# Patient Record
Sex: MALE | Race: WHITE | Employment: OTHER | URBAN - METROPOLITAN AREA
[De-identification: names, ages, dates, MRNs, and addresses within clinical notes are randomized per-mention and may not be internally consistent; named-entity substitution may affect disease eponyms.]

---

## 2017-10-04 PROBLEM — I25.10 CAD (CORONARY ARTERY DISEASE): Status: ACTIVE | Noted: 2017-10-04

## 2017-10-04 PROBLEM — I44.1 MOBITZ (TYPE) I (WENCKEBACH'S) ATRIOVENTRICULAR BLOCK: Status: ACTIVE | Noted: 2017-10-04

## 2018-04-17 DIAGNOSIS — I25.10 CORONARY ARTERY DISEASE INVOLVING NATIVE CORONARY ARTERY OF NATIVE HEART WITHOUT ANGINA PECTORIS: ICD-10-CM

## 2018-06-14 DIAGNOSIS — I25.10 CORONARY ARTERY DISEASE INVOLVING NATIVE CORONARY ARTERY OF NATIVE HEART WITHOUT ANGINA PECTORIS: ICD-10-CM

## 2018-06-14 RX ORDER — ROSUVASTATIN CALCIUM 10 MG/1
10 TABLET, COATED ORAL NIGHTLY
Qty: 90 TABLET | Refills: 0 | Status: SHIPPED | OUTPATIENT
Start: 2018-06-14 | End: 2018-09-13 | Stop reason: SDUPTHER

## 2018-06-14 RX ORDER — AMLODIPINE BESYLATE 2.5 MG/1
2.5 TABLET ORAL DAILY
Qty: 90 TABLET | Refills: 0 | Status: SHIPPED | OUTPATIENT
Start: 2018-06-14 | End: 2018-09-13 | Stop reason: SDUPTHER

## 2018-08-23 ENCOUNTER — OFFICE VISIT (OUTPATIENT)
Dept: PRIMARY CARE CLINIC | Age: 73
End: 2018-08-23
Payer: MEDICARE

## 2018-08-23 VITALS
DIASTOLIC BLOOD PRESSURE: 80 MMHG | HEART RATE: 82 BPM | SYSTOLIC BLOOD PRESSURE: 128 MMHG | OXYGEN SATURATION: 98 % | HEIGHT: 73 IN | TEMPERATURE: 96.6 F | BODY MASS INDEX: 26.11 KG/M2 | WEIGHT: 197 LBS

## 2018-08-23 DIAGNOSIS — Z00.00 ROUTINE GENERAL MEDICAL EXAMINATION AT A HEALTH CARE FACILITY: ICD-10-CM

## 2018-08-23 DIAGNOSIS — Z23 NEED FOR PROPHYLACTIC VACCINATION AGAINST STREPTOCOCCUS PNEUMONIAE (PNEUMOCOCCUS): ICD-10-CM

## 2018-08-23 DIAGNOSIS — I25.10 CORONARY ARTERY DISEASE INVOLVING NATIVE CORONARY ARTERY OF NATIVE HEART WITHOUT ANGINA PECTORIS: Primary | ICD-10-CM

## 2018-08-23 DIAGNOSIS — Z12.5 SCREENING PSA (PROSTATE SPECIFIC ANTIGEN): ICD-10-CM

## 2018-08-23 PROCEDURE — G0009 ADMIN PNEUMOCOCCAL VACCINE: HCPCS | Performed by: FAMILY MEDICINE

## 2018-08-23 PROCEDURE — 90670 PCV13 VACCINE IM: CPT | Performed by: FAMILY MEDICINE

## 2018-08-23 PROCEDURE — G0439 PPPS, SUBSEQ VISIT: HCPCS | Performed by: FAMILY MEDICINE

## 2018-08-23 ASSESSMENT — LIFESTYLE VARIABLES
HOW OFTEN DO YOU HAVE A DRINK CONTAINING ALCOHOL: 2
HOW OFTEN DURING THE LAST YEAR HAVE YOU FOUND THAT YOU WERE NOT ABLE TO STOP DRINKING ONCE YOU HAD STARTED: 0
HAS A RELATIVE, FRIEND, DOCTOR, OR ANOTHER HEALTH PROFESSIONAL EXPRESSED CONCERN ABOUT YOUR DRINKING OR SUGGESTED YOU CUT DOWN: 0
HOW OFTEN DURING THE LAST YEAR HAVE YOU BEEN UNABLE TO REMEMBER WHAT HAPPENED THE NIGHT BEFORE BECAUSE YOU HAD BEEN DRINKING: 0
AUDIT TOTAL SCORE: 2
HOW OFTEN DO YOU HAVE SIX OR MORE DRINKS ON ONE OCCASION: 0
HOW OFTEN DURING THE LAST YEAR HAVE YOU NEEDED AN ALCOHOLIC DRINK FIRST THING IN THE MORNING TO GET YOURSELF GOING AFTER A NIGHT OF HEAVY DRINKING: 0
HOW MANY STANDARD DRINKS CONTAINING ALCOHOL DO YOU HAVE ON A TYPICAL DAY: 0
HAVE YOU OR SOMEONE ELSE BEEN INJURED AS A RESULT OF YOUR DRINKING: 0
HOW OFTEN DURING THE LAST YEAR HAVE YOU HAD A FEELING OF GUILT OR REMORSE AFTER DRINKING: 0
AUDIT-C TOTAL SCORE: 2
HOW OFTEN DURING THE LAST YEAR HAVE YOU FAILED TO DO WHAT WAS NORMALLY EXPECTED FROM YOU BECAUSE OF DRINKING: 0

## 2018-08-23 ASSESSMENT — ENCOUNTER SYMPTOMS
SHORTNESS OF BREATH: 0
CHEST TIGHTNESS: 0

## 2018-08-23 ASSESSMENT — PATIENT HEALTH QUESTIONNAIRE - PHQ9
SUM OF ALL RESPONSES TO PHQ QUESTIONS 1-9: 0
SUM OF ALL RESPONSES TO PHQ QUESTIONS 1-9: 0

## 2018-08-23 ASSESSMENT — ANXIETY QUESTIONNAIRES: GAD7 TOTAL SCORE: 0

## 2018-08-23 NOTE — PROGRESS NOTES
Medicare Annual Wellness Visit subsequent  Name: Papito Arauz Date: 2018   MRN: [id number] Sex: Male   Age: 75 y.o. Ethnicity: Non-/Non    : 1945 Race: Abeba Barahona is here for Medicare AWV    Screenings for behavioral, psychosocial and functional/safety risks, and cognitive dysfunction are all negative except as indicated below. These results, as well as other patient data from the [address] form, are documented in Flowsheets linked to this Encounter. No Known Allergies  Prior to Visit Medications    Medication Sig Taking?  Authorizing Provider   rosuvastatin (CRESTOR) 10 MG tablet Take 1 tablet by mouth nightly  Bart Sy, DO   amLODIPine (NORVASC) 2.5 MG tablet Take 1 tablet by mouth daily  Bart Sy, DO   ticagrelor (BRILINTA) 90 MG TABS tablet Take 1 tablet by mouth 2 times daily  Bart Sy, DO   Coenzyme Q10-Levocarnitine (CO Q-10 PLUS PO) Take by mouth  Historical Provider, MD   aspirin 81 MG EC tablet Take 1 tablet by mouth daily  Figueroa Woods, DO   Multiple Vitamins-Minerals (CENTRUM SILVER ADULT 50+) TABS Take 1 tablet by mouth daily  Historical Provider, MD   Omega-3 Fatty Acids (FISH OIL EXTRA STRENGTH PO) Take by mouth daily  Historical Provider, MD   Specialty Vitamins Products (PROSTATE PO) Take by mouth daily  Historical Provider, MD     Past Medical History:   Diagnosis Date    BPH (benign prostatic hyperplasia)     Bronchitis, acute 2013    CAD (coronary artery disease)     Hand pain, right 2014    and numbing    Headache     Hyperlipidemia     Tinnitus      Past Surgical History:   Procedure Laterality Date    CORONARY ANGIOPLASTY WITH STENT PLACEMENT  10/03/2017    Dr Urban  LAD Mid Alpine 3x18mm    HAND SURGERY Right     Carpal tunnel & release trigger finger    HERNIA REPAIR      x2    KNEE ARTHROSCOPY      TONSILLECTOMY AND ADENOIDECTOMY Bilateral     VASECTOMY       Family

## 2018-08-29 ENCOUNTER — NURSE ONLY (OUTPATIENT)
Dept: PRIMARY CARE CLINIC | Age: 73
End: 2018-08-29

## 2018-08-29 ENCOUNTER — HOSPITAL ENCOUNTER (OUTPATIENT)
Age: 73
Discharge: HOME | End: 2018-08-31
Payer: MEDICARE

## 2018-08-29 DIAGNOSIS — E78.2 MIXED HYPERLIPIDEMIA: Primary | ICD-10-CM

## 2018-08-29 DIAGNOSIS — I25.10 CORONARY ARTERY DISEASE INVOLVING NATIVE CORONARY ARTERY OF NATIVE HEART WITHOUT ANGINA PECTORIS: ICD-10-CM

## 2018-08-29 DIAGNOSIS — Z12.5 SCREENING PSA (PROSTATE SPECIFIC ANTIGEN): ICD-10-CM

## 2018-08-29 LAB
ALBUMIN SERPL-MCNC: 4.3 G/DL (ref 3.5–5.2)
ALP BLD-CCNC: 51 U/L (ref 40–129)
ALT SERPL-CCNC: 20 U/L (ref 0–40)
ANION GAP SERPL CALCULATED.3IONS-SCNC: 16 MMOL/L (ref 7–16)
AST SERPL-CCNC: 29 U/L (ref 0–39)
BILIRUB SERPL-MCNC: 0.6 MG/DL (ref 0–1.2)
BUN BLDV-MCNC: 23 MG/DL (ref 8–23)
CALCIUM SERPL-MCNC: 9.3 MG/DL (ref 8.6–10.2)
CHLORIDE BLD-SCNC: 101 MMOL/L (ref 98–107)
CHOLESTEROL, TOTAL: 139 MG/DL (ref 0–199)
CO2: 23 MMOL/L (ref 22–29)
CREAT SERPL-MCNC: 0.9 MG/DL (ref 0.7–1.2)
GFR AFRICAN AMERICAN: >60
GFR NON-AFRICAN AMERICAN: >60 ML/MIN/1.73
GLUCOSE BLD-MCNC: 86 MG/DL (ref 74–109)
HDLC SERPL-MCNC: 48 MG/DL
LDL CHOLESTEROL CALCULATED: 80 MG/DL (ref 0–99)
POTASSIUM SERPL-SCNC: 4.8 MMOL/L (ref 3.5–5)
PROSTATE SPECIFIC ANTIGEN: 3.44 NG/ML (ref 0–4)
SODIUM BLD-SCNC: 140 MMOL/L (ref 132–146)
TOTAL PROTEIN: 7 G/DL (ref 6.4–8.3)
TRIGL SERPL-MCNC: 55 MG/DL (ref 0–149)
VLDLC SERPL CALC-MCNC: 11 MG/DL

## 2018-08-29 PROCEDURE — 80053 COMPREHEN METABOLIC PANEL: CPT

## 2018-08-29 PROCEDURE — G0103 PSA SCREENING: HCPCS

## 2018-08-29 PROCEDURE — 80061 LIPID PANEL: CPT

## 2018-09-13 DIAGNOSIS — I25.10 CORONARY ARTERY DISEASE INVOLVING NATIVE CORONARY ARTERY OF NATIVE HEART WITHOUT ANGINA PECTORIS: ICD-10-CM

## 2018-09-13 RX ORDER — ROSUVASTATIN CALCIUM 10 MG/1
10 TABLET, COATED ORAL NIGHTLY
Qty: 90 TABLET | Refills: 1 | Status: SHIPPED | OUTPATIENT
Start: 2018-09-13 | End: 2019-01-28 | Stop reason: SDUPTHER

## 2018-09-13 RX ORDER — AMLODIPINE BESYLATE 2.5 MG/1
2.5 TABLET ORAL DAILY
Qty: 90 TABLET | Refills: 1 | Status: SHIPPED | OUTPATIENT
Start: 2018-09-13 | End: 2019-01-28 | Stop reason: SDUPTHER

## 2018-09-25 DIAGNOSIS — Z12.11 ENCOUNTER FOR SCREENING FOR MALIGNANT NEOPLASM OF COLON: Primary | ICD-10-CM

## 2018-09-27 ENCOUNTER — TELEPHONE (OUTPATIENT)
Dept: PRIMARY CARE CLINIC | Age: 73
End: 2018-09-27

## 2018-09-27 DIAGNOSIS — M17.9 OSTEOARTHRITIS OF KNEE, UNSPECIFIED LATERALITY, UNSPECIFIED OSTEOARTHRITIS TYPE: Primary | ICD-10-CM

## 2018-09-27 DIAGNOSIS — Z12.11 ENCOUNTER FOR SCREENING FOR MALIGNANT NEOPLASM OF COLON: ICD-10-CM

## 2018-09-27 LAB
CONTROL: NORMAL
HEMOCCULT STL QL: NEGATIVE

## 2018-09-27 PROCEDURE — 82274 ASSAY TEST FOR BLOOD FECAL: CPT | Performed by: FAMILY MEDICINE

## 2018-10-03 ENCOUNTER — TELEPHONE (OUTPATIENT)
Dept: CARDIOLOGY CLINIC | Age: 73
End: 2018-10-03

## 2018-10-29 DIAGNOSIS — I25.10 CORONARY ARTERY DISEASE INVOLVING NATIVE CORONARY ARTERY OF NATIVE HEART WITHOUT ANGINA PECTORIS: ICD-10-CM

## 2018-12-01 ENCOUNTER — APPOINTMENT (OUTPATIENT)
Dept: CT IMAGING | Age: 73
End: 2018-12-01
Payer: MEDICARE

## 2018-12-01 ENCOUNTER — APPOINTMENT (OUTPATIENT)
Dept: GENERAL RADIOLOGY | Age: 73
End: 2018-12-01
Payer: MEDICARE

## 2018-12-01 ENCOUNTER — HOSPITAL ENCOUNTER (EMERGENCY)
Age: 73
Discharge: HOME | End: 2018-12-01
Attending: EMERGENCY MEDICINE
Payer: MEDICARE

## 2018-12-01 VITALS
HEIGHT: 73 IN | SYSTOLIC BLOOD PRESSURE: 150 MMHG | TEMPERATURE: 97.5 F | HEART RATE: 71 BPM | WEIGHT: 200 LBS | OXYGEN SATURATION: 96 % | DIASTOLIC BLOOD PRESSURE: 86 MMHG | BODY MASS INDEX: 26.51 KG/M2 | RESPIRATION RATE: 17 BRPM

## 2018-12-01 DIAGNOSIS — R07.9 CHEST PAIN, UNSPECIFIED TYPE: Primary | ICD-10-CM

## 2018-12-01 LAB
ALBUMIN SERPL-MCNC: 4.2 G/DL (ref 3.5–5.2)
ALP BLD-CCNC: 60 U/L (ref 40–129)
ALT SERPL-CCNC: 20 U/L (ref 0–40)
ANION GAP SERPL CALCULATED.3IONS-SCNC: 14 MMOL/L (ref 7–16)
AST SERPL-CCNC: 22 U/L (ref 0–39)
BASOPHILS ABSOLUTE: 0.03 E9/L (ref 0–0.2)
BASOPHILS RELATIVE PERCENT: 0.4 % (ref 0–2)
BILIRUB SERPL-MCNC: 0.6 MG/DL (ref 0–1.2)
BUN BLDV-MCNC: 16 MG/DL (ref 8–23)
CALCIUM SERPL-MCNC: 9 MG/DL (ref 8.6–10.2)
CHLORIDE BLD-SCNC: 105 MMOL/L (ref 98–107)
CO2: 21 MMOL/L (ref 22–29)
CREAT SERPL-MCNC: 1 MG/DL (ref 0.7–1.2)
D DIMER: 235 NG/ML DDU
EOSINOPHILS ABSOLUTE: 0.13 E9/L (ref 0.05–0.5)
EOSINOPHILS RELATIVE PERCENT: 1.8 % (ref 0–6)
GFR AFRICAN AMERICAN: >60
GFR NON-AFRICAN AMERICAN: >60 ML/MIN/1.73
GLUCOSE BLD-MCNC: 122 MG/DL (ref 74–99)
HCT VFR BLD CALC: 42.7 % (ref 37–54)
HEMOGLOBIN: 14.7 G/DL (ref 12.5–16.5)
IMMATURE GRANULOCYTES #: 0.05 E9/L
IMMATURE GRANULOCYTES %: 0.7 % (ref 0–5)
LYMPHOCYTES ABSOLUTE: 0.97 E9/L (ref 1.5–4)
LYMPHOCYTES RELATIVE PERCENT: 13.7 % (ref 20–42)
MCH RBC QN AUTO: 31.2 PG (ref 26–35)
MCHC RBC AUTO-ENTMCNC: 34.4 % (ref 32–34.5)
MCV RBC AUTO: 90.7 FL (ref 80–99.9)
MONOCYTES ABSOLUTE: 0.67 E9/L (ref 0.1–0.95)
MONOCYTES RELATIVE PERCENT: 9.5 % (ref 2–12)
NEUTROPHILS ABSOLUTE: 5.22 E9/L (ref 1.8–7.3)
NEUTROPHILS RELATIVE PERCENT: 73.9 % (ref 43–80)
PDW BLD-RTO: 12.8 FL (ref 11.5–15)
PLATELET # BLD: 183 E9/L (ref 130–450)
PMV BLD AUTO: 8.8 FL (ref 7–12)
POTASSIUM SERPL-SCNC: 4.4 MMOL/L (ref 3.5–5)
RBC # BLD: 4.71 E12/L (ref 3.8–5.8)
SODIUM BLD-SCNC: 140 MMOL/L (ref 132–146)
TOTAL PROTEIN: 6.9 G/DL (ref 6.4–8.3)
TROPONIN: <0.01 NG/ML (ref 0–0.03)
WBC # BLD: 7.1 E9/L (ref 4.5–11.5)

## 2018-12-01 PROCEDURE — 93005 ELECTROCARDIOGRAM TRACING: CPT | Performed by: PHYSICIAN ASSISTANT

## 2018-12-01 PROCEDURE — 85378 FIBRIN DEGRADE SEMIQUANT: CPT

## 2018-12-01 PROCEDURE — 99285 EMERGENCY DEPT VISIT HI MDM: CPT

## 2018-12-01 PROCEDURE — 36415 COLL VENOUS BLD VENIPUNCTURE: CPT

## 2018-12-01 PROCEDURE — 2580000003 HC RX 258: Performed by: RADIOLOGY

## 2018-12-01 PROCEDURE — 85025 COMPLETE CBC W/AUTO DIFF WBC: CPT

## 2018-12-01 PROCEDURE — 6360000004 HC RX CONTRAST MEDICATION: Performed by: RADIOLOGY

## 2018-12-01 PROCEDURE — 6370000000 HC RX 637 (ALT 250 FOR IP): Performed by: EMERGENCY MEDICINE

## 2018-12-01 PROCEDURE — 71275 CT ANGIOGRAPHY CHEST: CPT

## 2018-12-01 PROCEDURE — 80053 COMPREHEN METABOLIC PANEL: CPT

## 2018-12-01 PROCEDURE — 99222 1ST HOSP IP/OBS MODERATE 55: CPT | Performed by: INTERNAL MEDICINE

## 2018-12-01 PROCEDURE — 84484 ASSAY OF TROPONIN QUANT: CPT

## 2018-12-01 PROCEDURE — 71045 X-RAY EXAM CHEST 1 VIEW: CPT

## 2018-12-01 PROCEDURE — APPSS60 APP SPLIT SHARED TIME 46-60 MINUTES: Performed by: NURSE PRACTITIONER

## 2018-12-01 RX ORDER — ASPIRIN 325 MG
325 TABLET ORAL ONCE
Status: COMPLETED | OUTPATIENT
Start: 2018-12-01 | End: 2018-12-01

## 2018-12-01 RX ORDER — SODIUM CHLORIDE 0.9 % (FLUSH) 0.9 %
10 SYRINGE (ML) INJECTION ONCE
Status: COMPLETED | OUTPATIENT
Start: 2018-12-01 | End: 2018-12-01

## 2018-12-01 RX ADMIN — ASPIRIN 325 MG ORAL TABLET 325 MG: 325 PILL ORAL at 12:03

## 2018-12-01 RX ADMIN — Medication 10 ML: at 12:51

## 2018-12-01 RX ADMIN — IOPAMIDOL 60 ML: 755 INJECTION, SOLUTION INTRAVENOUS at 12:51

## 2018-12-01 ASSESSMENT — ENCOUNTER SYMPTOMS
VOMITING: 0
COLOR CHANGE: 0
CONSTIPATION: 0
SHORTNESS OF BREATH: 0
COUGH: 0
NAUSEA: 0
DIARRHEA: 0
WHEEZING: 0
ABDOMINAL PAIN: 0

## 2018-12-01 NOTE — ED PROVIDER NOTES
69-year-old male with history of hyperlipidemia, cardiac stent placed in December 2017, on brilinta, who presents to the ED with chest pain. Patient states that he woke up with it today. He states that yesterday he was working out, for couple hours, and then also cutting some sticks and doing yard work yesterday. Today, he states that he's had some worsening left-sided chest pain. He states that the pain is located in the left portion of the sternum, he states it is worse with exhalation, and has also been complaining of some left-sided facial numbness, however he states that that has been going on for the last couple of years. Patient states that he has not had a stress test since last year, but does state that he did have a stress test after his stent was placed, and was negative at that time. Patient follows with Dr. Ronald Acosta. Patient flew to [place] drove to the [place], and flew back in October. Heart Score for Risk of Major Adverse Cardiac Events    HISTORY  Highly suspicious  +2  Moderately suspicious +1  Slightly suspicious  +0    EKG  Significant ST depression +2  Non specific repolarization +1  Normal    +0    AGE  >=65    +2  45-65    +1  <45    +0    RISK FACTORS*  > or = 3 risk factors or  +2    history of atherosclerotic    disease. 1-2 risk factors  +1  No risk factors   +0    TROPONIN  >= 3 x normal limit  +2  1-3 x normal limit  +1  Within lormal range  +0    Total    5    *Risk factors  Risk factors: HTN, hypercholesterolemia, DM, obesity (BMI >30 kg/m²), smoking (current, or smoking cessation =3 mo), positive family history (parent or sibling with CVD before age 72); atherosclerotic disease: prior MI, PCI/CABG, CVA/TIA, or peripheral arterial disease    INTERPRETATION  0-3: 0.9-1.7% risk of adverse cardiac event. In the HEART Score, these patients were discharged. (0.99% retrospective)  (1.7% prospective)  4-6: 12-16.6% risk of adverse cardiac event.     In the HEART Score, these XR CHEST PORTABLE   Final Result   No definite pulmonary airspace consolidation.          ------------------------- NURSING NOTES AND VITALS REVIEWED ---------------------------  Date / Time Roomed:  12/1/2018 11:11 AM  ED Bed Assignment:  01/01    The nursing notes within the ED encounter and vital signs as below have been reviewed. BP (!) 150/86   Pulse 71   Temp 97.5 °F (36.4 °C) (Temporal)   Resp 17   Ht 6' 0.5\" (1.842 m)   Wt 200 lb (90.7 kg)   SpO2 96%   BMI 26.75 kg/m²   Oxygen Saturation Interpretation: Normal      ------------------------------------------ PROGRESS NOTES ------------------------------------------  ED Course as of Dec 01 1431   Sat Dec 01, 2018   1149 Patient resting comfortable in no acute distress. [KS]   1369 At [facility] chest at this time. [KS]      ED Course User Index  [KS] Layne Andrew DO         2:31 PM  I have spoken with the patient and discussed todays results, in addition to providing specific details for the plan of care and counseling regarding the diagnosis and prognosis. Their questions are answered at this time and they are agreeable with the plan. I discussed at length with them reasons for immediate return here for re evaluation. They will followup with their primary care physician by calling their office on Monday.      --------------------------------- ADDITIONAL PROVIDER NOTES ---------------------------------  At this time the patient is without objective evidence of an acute process requiring hospitalization or inpatient management. They have remained hemodynamically stable throughout their entire ED visit and are stable for discharge with outpatient follow-up. The plan has been discussed in detail and they are aware of the specific conditions for emergent return, as well as the importance of follow-up. New Prescriptions    No medications on file       Diagnosis:  1.  Chest pain, unspecified type        Disposition:  Patient's disposition:

## 2018-12-01 NOTE — CONSULTS
Patient seen and examined. Chart, labs, imaging studies, EKG and rhythm strips reviewed. Full consult to follow. We were asked to see the patient for Chest pain    PHYSICAL EXAM:   BP (!) 150/86   Pulse 71   Temp 97.5 °F (36.4 °C) (Temporal)   Resp 17   Ht 6' 0.5\" (1.842 m)   Wt 200 lb (90.7 kg)   SpO2 96%   BMI 26.75 kg/m²   CONST:  Well developed  male, examined in room with family present, reclining in bed, well nourished who appears stated age. Awake, alert, cooperative, no apparent distress  HEENT:   Head- Normocephalic, atraumatic   Eyes- Conjunctivae pink, anicteric  Throat- Oral mucosa pink and moist  Neck-  No stridor, trachea midline, no jugular venous distention. No adenopathy   CHEST: Chest symmetrical and non-tender to palpation. No accessory muscle use or intercostal retractions  RESPIRATORY: Lung sounds - clear throughout fields   CARDIOVASCULAR:     No carotid bruit  Heart Inspection- shows no noted pulsations  Heart Palpation- no heaves or thrills; PMI is non-displaced   Heart Ausculation- Regular rate and rhythm, no murmur. No s3, s4 or rub   PV: No lower extremity edema. No varicosities. Pedal pulses palpable, no clubbing or cyanosis   ABDOMEN: Soft, non-tender to light palpation. Bowel sounds present. No palpable masses no organomegaly; no abdominal bruit  MS: Good muscle strength and tone. No atrophy or abnormal movements. : Deferred  SKIN: Warm and dry no statis dermatitis or ulcers   NEURO / PSYCH: Oriented to person, place and time. Speech clear and appropriate. Follows all commands. Pleasant affect     IMPRESSION:  1. Chest pain, different from his pain prior to the angioplasty, occurs with exhalation and also reproducible with chest palpation. Lifts weight ( + does aerobic exercise 3-4 days a week ) as part of his routine workout  2. Known CAD s/p DIVINA to LAD and balloon angioplasty to D2 ( kissing balloons; bifurcation lesion ) 10/2017  3.  Chronic jaw
9. 0     CARDIAC ENZYMES:  Recent Labs      12/01/18   1058   TROPONINI  <0.01     FASTING LIPID PANEL:  Lab Results   Component Value Date    CHOL 139 08/29/2018    HDL 48 08/29/2018    LDLCALC 80 08/29/2018    TRIG 55 08/29/2018     LIVER PROFILE:  Recent Labs      12/01/18   1058   AST  22   ALT  20   LABALBU  4.2     Impression and plan per Dr. Kendrick Fitzgerald  Electronically signed by MOSES Walker CNP on 12/1/2018 at 12:34 PM     I personally and independently saw and examined patient and reviewed all done pertinent laboratory data, imaging studies, ECGs and rhythm strips. I have reviewed and agree with the APN history and physical exam as documented in the above note. Electronically signed by Valente Rodriguez MD on 12/2/2018 at 10:50 AM     We were asked to see the patient for Chest pain     IMPRESSION:  1. Chest pain, different from his pain prior to the angioplasty, occurs with exhalation and also reproducible with chest palpation. Lifts weight ( + does aerobic exercise 3-4 days a week ) as part of his routine workout  2. Known CAD s/p DIVINA to LAD and balloon angioplasty to D2 ( kissing balloons; bifurcation lesion ) 10/2017  3. Chronic jaw pain  4. HTN  5. HLD  6. H/o of Wenckebach with BB therapy  7. Arthritis both knees and right hip. Left hand carpal tunnel  8. BPH  9. ? anxiety     PLAN:   1. Reassure  2. OK for discharge from cardiology stand point  3.  To keep FU appointment with his primary cardiologist Dr. Oren Fabry scheduled for 12/13/18     Electronically signed by Valente Rodriguez MD on 12/1/2018 at 1:24 PM

## 2018-12-04 LAB
EKG ATRIAL RATE: 73 BPM
EKG P AXIS: 66 DEGREES
EKG P-R INTERVAL: 178 MS
EKG Q-T INTERVAL: 366 MS
EKG QRS DURATION: 84 MS
EKG QTC CALCULATION (BAZETT): 403 MS
EKG R AXIS: 73 DEGREES
EKG T AXIS: 39 DEGREES
EKG VENTRICULAR RATE: 73 BPM

## 2018-12-13 ENCOUNTER — OFFICE VISIT (OUTPATIENT)
Dept: CARDIOLOGY CLINIC | Age: 73
End: 2018-12-13
Payer: MEDICARE

## 2018-12-13 VITALS
RESPIRATION RATE: 18 BRPM | HEIGHT: 72 IN | DIASTOLIC BLOOD PRESSURE: 78 MMHG | SYSTOLIC BLOOD PRESSURE: 132 MMHG | BODY MASS INDEX: 28.21 KG/M2 | HEART RATE: 69 BPM | WEIGHT: 208.3 LBS

## 2018-12-13 DIAGNOSIS — I25.10 CORONARY ARTERY DISEASE INVOLVING NATIVE CORONARY ARTERY OF NATIVE HEART WITHOUT ANGINA PECTORIS: Primary | ICD-10-CM

## 2018-12-13 PROCEDURE — 99213 OFFICE O/P EST LOW 20 MIN: CPT | Performed by: INTERNAL MEDICINE

## 2018-12-13 PROCEDURE — 93000 ELECTROCARDIOGRAM COMPLETE: CPT | Performed by: INTERNAL MEDICINE

## 2018-12-14 ENCOUNTER — TELEPHONE (OUTPATIENT)
Dept: PRIMARY CARE CLINIC | Age: 73
End: 2018-12-14

## 2018-12-14 NOTE — PROGRESS NOTES
Ericka Carbajal  1945  Date of Service: 12/14/2018    Patient Active Problem List    Diagnosis Date Noted    Chest pain     CAD (coronary artery disease) 10/04/2017    Ani (type) [facility]) atrioventricular block 10/04/2017    Coronary artery disease due to lipid rich plaque     Hyperlipidemia     Headache     Tinnitus     Hand pain, right 09/01/2014    Bronchitis, acute 03/01/2013       Social History     Social History    Marital status:      Spouse name: N/A    Number of children: N/A    Years of education: N/A     Social History Main Topics    Smoking status: Former Smoker     Packs/day: 1.00     Years: 14.00     Types: Cigars     Quit date: 2003    Smokeless tobacco: Never Used      Comment: occasional    Alcohol use Yes    Drug use: No    Sexual activity: Yes     Partners: Female     Other Topics Concern    None     Social History Narrative    None       Current Outpatient Prescriptions   Medication Sig Dispense Refill    ticagrelor (BRILINTA) 90 MG TABS tablet Take 1 tablet by mouth 2 times daily 180 tablet 1    amLODIPine (NORVASC) 2.5 MG tablet Take 1 tablet by mouth daily 90 tablet 1    rosuvastatin (CRESTOR) 10 MG tablet Take 1 tablet by mouth nightly 90 tablet 1    Coenzyme Q10-Levocarnitine (CO Q-10 PLUS PO) Take by mouth      aspirin 81 MG EC tablet Take 1 tablet by mouth daily 30 tablet 3    Multiple Vitamins-Minerals (CENTRUM SILVER ADULT 50+) TABS Take 1 tablet by mouth daily      Omega-3 Fatty Acids (FISH OIL EXTRA STRENGTH PO) Take by mouth daily      Specialty Vitamins Products (PROSTATE PO) Take by mouth daily       No current facility-administered medications for this visit. No Known Allergies    Chief Complaint:  Ericka Carbajal is here today for follow up and management/recomendations for CP, abn stress     History of Present Illness: Ericka Carbajal states that He does house work, goes up the stairs, & goes shopping.  He also exercises at the [employer] He denies any chest discomfort or dyspnea on exertion while performing any of these activities. He still has the persistent numbness in his jaw for the last 3-4 years. He was recently seen in the hospital for very atypical chest discomforts and discharged. He has not had any recurrences even with the above activities since his discharge. He denies any orthopnea/PND, or lower extremity edema. He denies any palpitations or presyncopal symptoms. REVIEW OF SYSTEMS:  As above. Patient does not complain of any fever, chills, nausea, vomiting or diarrhea. No focal, motor or neurological deficits. No changes in his/her vision, hearing, bowel or bladder habits. He is not known to have a history of thyroid problems. No recent nose bleeds. PHYSICAL EXAM:  Vitals:    12/13/18 1131   BP: 132/78   Pulse: 69   Resp: 18   Weight: 208 lb 4.8 oz (94.5 kg)   Height: 6' (1.829 m)       GENERAL:  He is alert and oriented x 3, communicates well, in no distress. NECK:  No masses, trachea is mid position. Supple, full ROM, no JVD or bruits. No palpable thyromegaly or lymphadenopathy. HEART:  Regular rate and rhythm. Normal S1 and S2. There is an S4 gallop and a I/VI (Normal physiologic) systolic murmur. LUNGS:  Clear to auscultation bilaterally. No use of accessory muscles. symmetrical excursion. ABDOMEN:  Soft, non-tender. Normal bowel sounds. EXTREMITIES:  Full ROM x 4. No bilateral lower extremity edema. Good distal pulses. EYES:  Extraocular muscles intact. PERRL. Normal lids & conjunctiva. ENT:  Nares are clear & not bleeding. Moist mucosa. Normal lips formation. No external masses   NEURO: no tremors, full ROM x 4, EOMI. SKIN:  Warm, dry and intact. Normal turgor. EKG: Sinus rhythm, 69 bpm, nl axis, nonspecific ST - T wave changes. Assessment:   1. Coronary artery disease. No symptoms of recurring ischemia at this time. 2. Hypertension  3. Hypercholesterolemia  4.  Mobitz 1 heart block. Not on any beta blockers. Recommendations:  1. He is following the cholesterol with Dr. Riya Montano. 2. Continue current cardiac medications. Thank you for allowing me to participate in your patient's care.       [address], [address] [facility]  Interventional Cardiology

## 2019-01-28 DIAGNOSIS — I25.10 CORONARY ARTERY DISEASE INVOLVING NATIVE CORONARY ARTERY OF NATIVE HEART WITHOUT ANGINA PECTORIS: ICD-10-CM

## 2019-01-28 RX ORDER — ROSUVASTATIN CALCIUM 10 MG/1
10 TABLET, COATED ORAL NIGHTLY
Qty: 90 TABLET | Refills: 1 | Status: SHIPPED | OUTPATIENT
Start: 2019-01-28 | End: 2019-09-23 | Stop reason: SDUPTHER

## 2019-01-28 RX ORDER — AMLODIPINE BESYLATE 2.5 MG/1
2.5 TABLET ORAL DAILY
Qty: 90 TABLET | Refills: 1 | Status: SHIPPED | OUTPATIENT
Start: 2019-01-28 | End: 2019-09-23 | Stop reason: SDUPTHER

## 2019-04-29 ENCOUNTER — TELEPHONE (OUTPATIENT)
Dept: PRIMARY CARE CLINIC | Age: 74
End: 2019-04-29

## 2019-04-29 NOTE — TELEPHONE ENCOUNTER
Patient needs refill on Brilinta, but he is wondering if he should still be taking this. If so, please send to [facility].

## 2019-05-03 ENCOUNTER — TELEPHONE (OUTPATIENT)
Dept: ADMINISTRATIVE | Age: 74
End: 2019-05-03

## 2019-05-03 NOTE — TELEPHONE ENCOUNTER
Pt calling at the request of his PCP to see if he still needs to stay on the [address]? If so, he needs an RX escribed to [facility] for 90 day 3 refills.

## 2019-05-03 NOTE — TELEPHONE ENCOUNTER
Patient's wife notified of Dr. Kevyn Veloz recommendation. Chart amended. Nessa e-scribed to pharmacy.

## 2019-05-06 ENCOUNTER — OFFICE VISIT (OUTPATIENT)
Dept: PRIMARY CARE CLINIC | Age: 74
End: 2019-05-06
Payer: MEDICARE

## 2019-05-06 VITALS
DIASTOLIC BLOOD PRESSURE: 80 MMHG | OXYGEN SATURATION: 96 % | HEART RATE: 75 BPM | WEIGHT: 208 LBS | SYSTOLIC BLOOD PRESSURE: 130 MMHG | BODY MASS INDEX: 28.21 KG/M2 | TEMPERATURE: 98.2 F

## 2019-05-06 DIAGNOSIS — M17.9 OSTEOARTHRITIS OF KNEE, UNSPECIFIED LATERALITY, UNSPECIFIED OSTEOARTHRITIS TYPE: ICD-10-CM

## 2019-05-06 DIAGNOSIS — Z01.818 PRE-OPERATIVE CLEARANCE: Primary | ICD-10-CM

## 2019-05-06 PROCEDURE — 99213 OFFICE O/P EST LOW 20 MIN: CPT | Performed by: FAMILY MEDICINE

## 2019-05-06 PROCEDURE — 93000 ELECTROCARDIOGRAM COMPLETE: CPT | Performed by: FAMILY MEDICINE

## 2019-05-06 ASSESSMENT — PATIENT HEALTH QUESTIONNAIRE - PHQ9
1. LITTLE INTEREST OR PLEASURE IN DOING THINGS: 0
2. FEELING DOWN, DEPRESSED OR HOPELESS: 0
SUM OF ALL RESPONSES TO PHQ QUESTIONS 1-9: 0
SUM OF ALL RESPONSES TO PHQ QUESTIONS 1-9: 0
SUM OF ALL RESPONSES TO PHQ9 QUESTIONS 1 & 2: 0

## 2019-05-06 ASSESSMENT — ENCOUNTER SYMPTOMS
BACK PAIN: 1
COUGH: 0

## 2019-05-06 NOTE — LETTER
[address] [id number]  Phone: [phone]  Fax: [phone]    Emily Gill DO        May 6, 2019     Patient: Arlet Delgado   YOB: 1945   Date of Visit: 5/6/2019       To Whom It May Concern: It is my medical opinion that Arlet Delgado requires a disability parking placard for the following reasons:  He has limited walking ability due to an orthopedic condition. Duration of need: 2 years    If you have any questions or concerns, please don't hesitate to call.     Sincerely,        Emily Gill DO

## 2019-07-23 ENCOUNTER — OFFICE VISIT (OUTPATIENT)
Dept: PRIMARY CARE CLINIC | Age: 74
End: 2019-07-23
Payer: MEDICARE

## 2019-07-23 VITALS
HEIGHT: 73 IN | OXYGEN SATURATION: 100 % | TEMPERATURE: 98 F | DIASTOLIC BLOOD PRESSURE: 82 MMHG | HEART RATE: 80 BPM | BODY MASS INDEX: 26.24 KG/M2 | SYSTOLIC BLOOD PRESSURE: 134 MMHG | WEIGHT: 198 LBS

## 2019-07-23 DIAGNOSIS — Z01.818 PRE-OPERATIVE CLEARANCE: Primary | ICD-10-CM

## 2019-07-23 DIAGNOSIS — M17.9 OSTEOARTHRITIS OF KNEE, UNSPECIFIED LATERALITY, UNSPECIFIED OSTEOARTHRITIS TYPE: ICD-10-CM

## 2019-07-23 PROCEDURE — 99213 OFFICE O/P EST LOW 20 MIN: CPT | Performed by: FAMILY MEDICINE

## 2019-07-23 ASSESSMENT — ENCOUNTER SYMPTOMS
DIARRHEA: 0
SORE THROAT: 0
ABDOMINAL PAIN: 0
SHORTNESS OF BREATH: 0
CONSTIPATION: 0

## 2019-07-23 NOTE — PROGRESS NOTES
Constitutional: He is oriented to person, place, and time. He appears well-nourished. Eyes: Conjunctivae are normal. No scleral icterus. Neck: Neck supple. Carotid bruit is not present. No thyromegaly present. Cardiovascular: Normal rate and regular rhythm. No murmur heard. Pulmonary/Chest: Effort normal and breath sounds normal. He has no wheezes. He has no rales. Abdominal: Soft. Bowel sounds are normal. He exhibits no mass. There is no tenderness. There is no rebound and no guarding. Musculoskeletal: Normal range of motion. He exhibits no edema. Right knee: He exhibits normal range of motion and no swelling. No tenderness found. Lymphadenopathy:     He has no cervical adenopathy. Neurological: He is alert and oriented to person, place, and time. Skin: Skin is warm and dry. Psychiatric: He has a normal mood and affect. Vitals reviewed. ASSESSMENT AND PLAN:    Chip Desai was seen today for pre-op exam.    Diagnoses and all orders for this visit:    Pre-operative clearance    Osteoarthritis of knee, unspecified laterality, unspecified osteoarthritis type    - low risk for surgery form filled out. Return in about 4 months (around 11/23/2019) for medicare pe and labs. Jan Huerta DO  I reviewed the students documentation ( Hx, exam, MDM ), examined the patient and performed the A&P.

## 2019-07-30 LAB
ALBUMIN SERPL-MCNC: 4.1 G/DL
ALP BLD-CCNC: 61 U/L
ALT SERPL-CCNC: 17 U/L
ANION GAP SERPL CALCULATED.3IONS-SCNC: 7 MMOL/L
AST SERPL-CCNC: 19 U/L
BASOPHILS ABSOLUTE: ABNORMAL /ΜL
BASOPHILS RELATIVE PERCENT: ABNORMAL %
BILIRUB SERPL-MCNC: 0.6 MG/DL (ref 0.1–1.4)
BUN BLDV-MCNC: 21 MG/DL
CALCIUM SERPL-MCNC: 9.2 MG/DL
CHLORIDE BLD-SCNC: 109 MMOL/L
CO2: 25 MMOL/L
CREAT SERPL-MCNC: 1 MG/DL
EOSINOPHILS ABSOLUTE: ABNORMAL /ΜL
EOSINOPHILS RELATIVE PERCENT: ABNORMAL %
GFR CALCULATED: 73
GLUCOSE BLD-MCNC: 107 MG/DL
HCT VFR BLD CALC: 39.3 % (ref 41–53)
HEMOGLOBIN: 13.7 G/DL (ref 13.5–17.5)
LYMPHOCYTES ABSOLUTE: ABNORMAL /ΜL
LYMPHOCYTES RELATIVE PERCENT: ABNORMAL %
MCH RBC QN AUTO: 31.3 PG
MCHC RBC AUTO-ENTMCNC: 34.8 G/DL
MCV RBC AUTO: 89.7 FL
MICROSCOPIC URINE: NORMAL
MONOCYTES ABSOLUTE: ABNORMAL /ΜL
MONOCYTES RELATIVE PERCENT: ABNORMAL %
NEUTROPHILS ABSOLUTE: ABNORMAL /ΜL
NEUTROPHILS RELATIVE PERCENT: ABNORMAL %
PLATELET # BLD: 197 K/ΜL
PMV BLD AUTO: 7 FL
POTASSIUM SERPL-SCNC: 4.6 MMOL/L
RBC # BLD: 4.38 10^6/ΜL
SODIUM BLD-SCNC: 141 MMOL/L
TOTAL PROTEIN: 7
WBC # BLD: 6.6 10^3/ML

## 2019-09-23 ENCOUNTER — OFFICE VISIT (OUTPATIENT)
Dept: PRIMARY CARE CLINIC | Age: 74
End: 2019-09-23
Payer: MEDICARE

## 2019-09-23 VITALS
BODY MASS INDEX: 25.84 KG/M2 | OXYGEN SATURATION: 99 % | TEMPERATURE: 97.9 F | SYSTOLIC BLOOD PRESSURE: 146 MMHG | WEIGHT: 195 LBS | HEART RATE: 84 BPM | HEIGHT: 73 IN | DIASTOLIC BLOOD PRESSURE: 80 MMHG

## 2019-09-23 DIAGNOSIS — I10 ESSENTIAL HYPERTENSION: Primary | ICD-10-CM

## 2019-09-23 DIAGNOSIS — I25.10 CORONARY ARTERY DISEASE INVOLVING NATIVE CORONARY ARTERY OF NATIVE HEART WITHOUT ANGINA PECTORIS: ICD-10-CM

## 2019-09-23 PROCEDURE — 99213 OFFICE O/P EST LOW 20 MIN: CPT | Performed by: FAMILY MEDICINE

## 2019-09-23 RX ORDER — ROSUVASTATIN CALCIUM 10 MG/1
10 TABLET, COATED ORAL NIGHTLY
Qty: 90 TABLET | Refills: 1 | Status: SHIPPED
Start: 2019-09-23 | End: 2020-05-05 | Stop reason: SDUPTHER

## 2019-09-23 RX ORDER — AMLODIPINE BESYLATE 5 MG/1
5 TABLET ORAL DAILY
Qty: 90 TABLET | Refills: 0 | Status: SHIPPED | OUTPATIENT
Start: 2019-09-23 | End: 2019-12-03 | Stop reason: DRUGHIGH

## 2019-09-23 ASSESSMENT — ENCOUNTER SYMPTOMS
SHORTNESS OF BREATH: 0
CONSTIPATION: 0
DIARRHEA: 0

## 2019-09-24 ENCOUNTER — TELEPHONE (OUTPATIENT)
Dept: CARDIOLOGY CLINIC | Age: 74
End: 2019-09-24

## 2019-09-24 NOTE — TELEPHONE ENCOUNTER
He had a very complex bifurcation lesion that is higher risk than most others. At this time he will need to stay on this indefinitely.

## 2019-10-18 ENCOUNTER — TELEPHONE (OUTPATIENT)
Dept: PRIMARY CARE CLINIC | Age: 74
End: 2019-10-18

## 2019-11-04 ENCOUNTER — TELEPHONE (OUTPATIENT)
Dept: CARDIOLOGY CLINIC | Age: 74
End: 2019-11-04

## 2019-11-04 DIAGNOSIS — R07.9 CHEST PAIN, UNSPECIFIED TYPE: Primary | ICD-10-CM

## 2019-11-04 DIAGNOSIS — I25.10 CORONARY ARTERY DISEASE INVOLVING NATIVE CORONARY ARTERY OF NATIVE HEART WITHOUT ANGINA PECTORIS: ICD-10-CM

## 2019-11-04 DIAGNOSIS — R20.0 NUMBNESS: ICD-10-CM

## 2019-11-04 DIAGNOSIS — R68.84 JAW PAIN: ICD-10-CM

## 2019-11-08 ENCOUNTER — HOSPITAL ENCOUNTER (OUTPATIENT)
Dept: CARDIOLOGY | Age: 74
Discharge: HOME | End: 2019-11-08
Payer: MEDICARE

## 2019-11-08 VITALS
HEART RATE: 75 BPM | HEIGHT: 72 IN | WEIGHT: 195 LBS | BODY MASS INDEX: 26.41 KG/M2 | DIASTOLIC BLOOD PRESSURE: 70 MMHG | SYSTOLIC BLOOD PRESSURE: 142 MMHG

## 2019-11-08 DIAGNOSIS — I25.10 CORONARY ARTERY DISEASE INVOLVING NATIVE CORONARY ARTERY OF NATIVE HEART WITHOUT ANGINA PECTORIS: ICD-10-CM

## 2019-11-08 DIAGNOSIS — R07.9 CHEST PAIN, UNSPECIFIED TYPE: Primary | ICD-10-CM

## 2019-11-08 DIAGNOSIS — R20.0 NUMBNESS: ICD-10-CM

## 2019-11-08 DIAGNOSIS — R68.84 JAW PAIN: ICD-10-CM

## 2019-11-08 PROCEDURE — 78452 HT MUSCLE IMAGE SPECT MULT: CPT

## 2019-11-08 PROCEDURE — 6360000002 HC RX W HCPCS: Performed by: INTERNAL MEDICINE

## 2019-11-08 PROCEDURE — A9500 TC99M SESTAMIBI: HCPCS | Performed by: INTERNAL MEDICINE

## 2019-11-08 PROCEDURE — 2580000003 HC RX 258: Performed by: INTERNAL MEDICINE

## 2019-11-08 PROCEDURE — 3430000000 HC RX DIAGNOSTIC RADIOPHARMACEUTICAL: Performed by: INTERNAL MEDICINE

## 2019-11-08 PROCEDURE — 93017 CV STRESS TEST TRACING ONLY: CPT

## 2019-11-08 RX ORDER — SODIUM CHLORIDE 0.9 % (FLUSH) 0.9 %
10 SYRINGE (ML) INJECTION PRN
Status: DISCONTINUED | OUTPATIENT
Start: 2019-11-08 | End: 2019-11-09 | Stop reason: HOSPADM

## 2019-11-08 RX ADMIN — REGADENOSON 0.4 MG: 0.08 INJECTION, SOLUTION INTRAVENOUS at 09:01

## 2019-11-08 RX ADMIN — Medication 10 ML: at 09:01

## 2019-11-08 RX ADMIN — Medication 24.1 MILLICURIE: at 09:01

## 2019-11-08 RX ADMIN — Medication 10 ML: at 06:53

## 2019-11-08 RX ADMIN — Medication 8.1 MILLICURIE: at 06:53

## 2019-11-08 RX ADMIN — Medication 10 ML: at 09:02

## 2019-11-11 ENCOUNTER — TELEPHONE (OUTPATIENT)
Dept: CARDIOLOGY CLINIC | Age: 74
End: 2019-11-11

## 2019-11-13 ENCOUNTER — HOSPITAL ENCOUNTER (OUTPATIENT)
Age: 74
Discharge: HOME | End: 2019-11-15
Payer: MEDICARE

## 2019-11-13 ENCOUNTER — OFFICE VISIT (OUTPATIENT)
Dept: PRIMARY CARE CLINIC | Age: 74
End: 2019-11-13
Payer: MEDICARE

## 2019-11-13 VITALS
BODY MASS INDEX: 27.4 KG/M2 | DIASTOLIC BLOOD PRESSURE: 72 MMHG | SYSTOLIC BLOOD PRESSURE: 122 MMHG | OXYGEN SATURATION: 96 % | TEMPERATURE: 97.2 F | WEIGHT: 202 LBS | HEART RATE: 68 BPM

## 2019-11-13 DIAGNOSIS — I25.10 CORONARY ARTERY DISEASE INVOLVING NATIVE CORONARY ARTERY OF NATIVE HEART WITHOUT ANGINA PECTORIS: ICD-10-CM

## 2019-11-13 DIAGNOSIS — Z12.5 SCREENING PSA (PROSTATE SPECIFIC ANTIGEN): ICD-10-CM

## 2019-11-13 DIAGNOSIS — Z12.11 COLON CANCER SCREENING: ICD-10-CM

## 2019-11-13 DIAGNOSIS — Z00.00 ROUTINE GENERAL MEDICAL EXAMINATION AT A HEALTH CARE FACILITY: ICD-10-CM

## 2019-11-13 DIAGNOSIS — I10 ESSENTIAL HYPERTENSION: ICD-10-CM

## 2019-11-13 LAB
ALBUMIN SERPL-MCNC: 4.5 G/DL (ref 3.5–5.2)
ALP BLD-CCNC: 66 U/L (ref 40–129)
ALT SERPL-CCNC: 6 U/L (ref 0–40)
ANION GAP SERPL CALCULATED.3IONS-SCNC: 14 MMOL/L (ref 7–16)
AST SERPL-CCNC: 19 U/L (ref 0–39)
BILIRUB SERPL-MCNC: 0.6 MG/DL (ref 0–1.2)
BUN BLDV-MCNC: 18 MG/DL (ref 8–23)
CALCIUM SERPL-MCNC: 9.6 MG/DL (ref 8.6–10.2)
CHLORIDE BLD-SCNC: 105 MMOL/L (ref 98–107)
CHOLESTEROL, TOTAL: 118 MG/DL (ref 0–199)
CO2: 24 MMOL/L (ref 22–29)
CREAT SERPL-MCNC: 0.9 MG/DL (ref 0.7–1.2)
GFR AFRICAN AMERICAN: >60
GFR NON-AFRICAN AMERICAN: >60 ML/MIN/1.73
GLUCOSE BLD-MCNC: 91 MG/DL (ref 74–99)
HDLC SERPL-MCNC: 40 MG/DL
LDL CHOLESTEROL CALCULATED: 64 MG/DL (ref 0–99)
POTASSIUM SERPL-SCNC: 4.8 MMOL/L (ref 3.5–5)
PROSTATE SPECIFIC ANTIGEN: 3 NG/ML (ref 0–4)
SODIUM BLD-SCNC: 143 MMOL/L (ref 132–146)
TOTAL PROTEIN: 7.3 G/DL (ref 6.4–8.3)
TRIGL SERPL-MCNC: 70 MG/DL (ref 0–149)
VLDLC SERPL CALC-MCNC: 14 MG/DL

## 2019-11-13 PROCEDURE — 80053 COMPREHEN METABOLIC PANEL: CPT

## 2019-11-13 PROCEDURE — 80061 LIPID PANEL: CPT

## 2019-11-13 PROCEDURE — G0103 PSA SCREENING: HCPCS

## 2019-11-13 PROCEDURE — G0439 PPPS, SUBSEQ VISIT: HCPCS | Performed by: FAMILY MEDICINE

## 2019-11-13 ASSESSMENT — LIFESTYLE VARIABLES
HOW OFTEN DURING THE LAST YEAR HAVE YOU HAD A FEELING OF GUILT OR REMORSE AFTER DRINKING: 0
HOW OFTEN DO YOU HAVE A DRINK CONTAINING ALCOHOL: 1
AUDIT-C TOTAL SCORE: 1
HOW OFTEN DURING THE LAST YEAR HAVE YOU FOUND THAT YOU WERE NOT ABLE TO STOP DRINKING ONCE YOU HAD STARTED: 0
HOW OFTEN DURING THE LAST YEAR HAVE YOU FAILED TO DO WHAT WAS NORMALLY EXPECTED FROM YOU BECAUSE OF DRINKING: 0
HAS A RELATIVE, FRIEND, DOCTOR, OR ANOTHER HEALTH PROFESSIONAL EXPRESSED CONCERN ABOUT YOUR DRINKING OR SUGGESTED YOU CUT DOWN: 0
HOW OFTEN DO YOU HAVE SIX OR MORE DRINKS ON ONE OCCASION: 0
HAVE YOU OR SOMEONE ELSE BEEN INJURED AS A RESULT OF YOUR DRINKING: 0
AUDIT TOTAL SCORE: 1
HOW MANY STANDARD DRINKS CONTAINING ALCOHOL DO YOU HAVE ON A TYPICAL DAY: 0
HOW OFTEN DURING THE LAST YEAR HAVE YOU BEEN UNABLE TO REMEMBER WHAT HAPPENED THE NIGHT BEFORE BECAUSE YOU HAD BEEN DRINKING: 0
HOW OFTEN DURING THE LAST YEAR HAVE YOU NEEDED AN ALCOHOLIC DRINK FIRST THING IN THE MORNING TO GET YOURSELF GOING AFTER A NIGHT OF HEAVY DRINKING: 0

## 2019-11-13 ASSESSMENT — ENCOUNTER SYMPTOMS
BLOOD IN STOOL: 0
CONSTIPATION: 0
DIARRHEA: 0
SHORTNESS OF BREATH: 0

## 2019-11-13 ASSESSMENT — PATIENT HEALTH QUESTIONNAIRE - PHQ9
SUM OF ALL RESPONSES TO PHQ QUESTIONS 1-9: 0
SUM OF ALL RESPONSES TO PHQ QUESTIONS 1-9: 0

## 2020-01-27 RX ORDER — AMLODIPINE BESYLATE 5 MG/1
5 TABLET ORAL DAILY
Qty: 90 TABLET | Refills: 1 | Status: SHIPPED
Start: 2020-01-27 | End: 2020-07-17 | Stop reason: SDUPTHER

## 2020-02-14 ENCOUNTER — TELEPHONE (OUTPATIENT)
Dept: PRIMARY CARE CLINIC | Age: 75
End: 2020-02-14

## 2020-02-14 LAB
CONTROL: NORMAL
HEMOCCULT STL QL: NEGATIVE

## 2020-05-05 ENCOUNTER — OFFICE VISIT (OUTPATIENT)
Dept: FAMILY MEDICINE CLINIC | Age: 75
End: 2020-05-05
Payer: MEDICARE

## 2020-05-05 VITALS
BODY MASS INDEX: 27.8 KG/M2 | WEIGHT: 205 LBS | SYSTOLIC BLOOD PRESSURE: 130 MMHG | TEMPERATURE: 97.7 F | HEART RATE: 91 BPM | OXYGEN SATURATION: 98 % | DIASTOLIC BLOOD PRESSURE: 78 MMHG

## 2020-05-05 PROCEDURE — G8510 SCR DEP NEG, NO PLAN REQD: HCPCS | Performed by: FAMILY MEDICINE

## 2020-05-05 PROCEDURE — 99213 OFFICE O/P EST LOW 20 MIN: CPT | Performed by: FAMILY MEDICINE

## 2020-05-05 RX ORDER — ROSUVASTATIN CALCIUM 10 MG/1
10 TABLET, COATED ORAL NIGHTLY
Qty: 90 TABLET | Refills: 1 | Status: SHIPPED
Start: 2020-05-05 | End: 2020-10-14

## 2020-05-05 ASSESSMENT — PATIENT HEALTH QUESTIONNAIRE - PHQ9
1. LITTLE INTEREST OR PLEASURE IN DOING THINGS: 0
SUM OF ALL RESPONSES TO PHQ QUESTIONS 1-9: 0
2. FEELING DOWN, DEPRESSED OR HOPELESS: 0
SUM OF ALL RESPONSES TO PHQ9 QUESTIONS 1 & 2: 0
SUM OF ALL RESPONSES TO PHQ QUESTIONS 1-9: 0

## 2020-05-05 ASSESSMENT — ENCOUNTER SYMPTOMS
CHEST TIGHTNESS: 0
DIARRHEA: 0
SHORTNESS OF BREATH: 0
CONSTIPATION: 0
BLOOD IN STOOL: 0

## 2020-05-06 LAB
ALBUMIN SERPL-MCNC: 4.4 G/DL
ALP BLD-CCNC: 54 U/L
ALT SERPL-CCNC: 22 U/L
ANION GAP SERPL CALCULATED.3IONS-SCNC: 5 MMOL/L
AST SERPL-CCNC: 23 U/L
BILIRUB SERPL-MCNC: 1 MG/DL (ref 0.1–1.4)
BUN BLDV-MCNC: 19 MG/DL
CALCIUM SERPL-MCNC: 9.2 MG/DL
CHLORIDE BLD-SCNC: 106 MMOL/L
CHOLESTEROL, TOTAL: 112 MG/DL
CHOLESTEROL/HDL RATIO: NORMAL
CO2: 26 MMOL/L
CREAT SERPL-MCNC: 1 MG/DL
GFR CALCULATED: 73
GLUCOSE BLD-MCNC: 94 MG/DL
HDLC SERPL-MCNC: 36 MG/DL (ref 35–70)
LDL CHOLESTEROL CALCULATED: 59 MG/DL (ref 0–160)
POTASSIUM SERPL-SCNC: 4.3 MMOL/L
SODIUM BLD-SCNC: 137 MMOL/L
TOTAL PROTEIN: 7.5
TRIGL SERPL-MCNC: 75 MG/DL
VLDLC SERPL CALC-MCNC: NORMAL MG/DL

## 2020-05-12 ENCOUNTER — TELEPHONE (OUTPATIENT)
Dept: PRIMARY CARE CLINIC | Age: 75
End: 2020-05-12

## 2020-05-13 ENCOUNTER — TELEPHONE (OUTPATIENT)
Dept: CARDIOLOGY CLINIC | Age: 75
End: 2020-05-13

## 2020-05-13 NOTE — TELEPHONE ENCOUNTER
Per Dr. Rebeca Marti, patient needs EKG and 24HR Holter next week (re: Wenckebach and bradycardia) and video visit the following week. Will need BP/P reading for virtual visit. Patient in hospital today, S/P hip surgery.

## 2020-05-15 NOTE — TELEPHONE ENCOUNTER
Patient's wife notified of Dr. Nora Carrington recommendations. EKG/Holter/F/U scheduled accordingly.

## 2020-05-18 ENCOUNTER — NURSE ONLY (OUTPATIENT)
Dept: CARDIOLOGY CLINIC | Age: 75
End: 2020-05-18
Payer: MEDICARE

## 2020-05-18 ENCOUNTER — TELEPHONE (OUTPATIENT)
Dept: CARDIOLOGY CLINIC | Age: 75
End: 2020-05-18

## 2020-05-18 PROCEDURE — 93000 ELECTROCARDIOGRAM COMPLETE: CPT | Performed by: INTERNAL MEDICINE

## 2020-05-19 NOTE — TELEPHONE ENCOUNTER
I called him to discuss his 2-1 heart block. I told him that he needs to see electrophysiology. He agrees. He states that he is asymptomatic. However, he just had hip surgery done and he does say that he has some swelling in his legs that he had surgery on and he gets short of breath when he bends over. Therefore, I am worried about a DVT and PE. He needs a stat d-dimer and call with results. He also needs referral to electrophysiology for the 2-1 heart block.   Thank you

## 2020-05-19 NOTE — TELEPHONE ENCOUNTER
Spoke to patient. He will go to [facility] tomorrow first thing in the morning. He is unable to go today. Referral placed to EP for urgent consult. Quoc Martinez in [address] notified.

## 2020-05-20 ENCOUNTER — HOSPITAL ENCOUNTER (OUTPATIENT)
Age: 75
Discharge: HOME | End: 2020-05-20
Payer: MEDICARE

## 2020-05-20 ENCOUNTER — HOSPITAL ENCOUNTER (EMERGENCY)
Age: 75
Discharge: HOME | End: 2020-05-20
Attending: EMERGENCY MEDICINE
Payer: MEDICARE

## 2020-05-20 ENCOUNTER — APPOINTMENT (OUTPATIENT)
Dept: CT IMAGING | Age: 75
End: 2020-05-20
Payer: MEDICARE

## 2020-05-20 ENCOUNTER — TELEPHONE (OUTPATIENT)
Dept: CARDIOLOGY CLINIC | Age: 75
End: 2020-05-20

## 2020-05-20 ENCOUNTER — APPOINTMENT (OUTPATIENT)
Dept: ULTRASOUND IMAGING | Age: 75
End: 2020-05-20
Payer: MEDICARE

## 2020-05-20 VITALS
OXYGEN SATURATION: 99 % | DIASTOLIC BLOOD PRESSURE: 68 MMHG | HEIGHT: 72 IN | SYSTOLIC BLOOD PRESSURE: 134 MMHG | BODY MASS INDEX: 27.77 KG/M2 | TEMPERATURE: 98.1 F | RESPIRATION RATE: 16 BRPM | HEART RATE: 78 BPM | WEIGHT: 205 LBS

## 2020-05-20 LAB
ANION GAP SERPL CALCULATED.3IONS-SCNC: 8 MMOL/L (ref 7–16)
BASOPHILS ABSOLUTE: 0.04 E9/L (ref 0–0.2)
BASOPHILS RELATIVE PERCENT: 0.4 % (ref 0–2)
BUN BLDV-MCNC: 20 MG/DL (ref 8–23)
CALCIUM SERPL-MCNC: 8.7 MG/DL (ref 8.6–10.2)
CHLORIDE BLD-SCNC: 106 MMOL/L (ref 98–107)
CO2: 24 MMOL/L (ref 22–29)
CREAT SERPL-MCNC: 1 MG/DL (ref 0.7–1.2)
D DIMER: 803 NG/ML DDU
EOSINOPHILS ABSOLUTE: 0.41 E9/L (ref 0.05–0.5)
EOSINOPHILS RELATIVE PERCENT: 4.5 % (ref 0–6)
GFR AFRICAN AMERICAN: >60
GFR NON-AFRICAN AMERICAN: >60 ML/MIN/1.73
GLUCOSE BLD-MCNC: 107 MG/DL (ref 74–99)
HCT VFR BLD CALC: 36.8 % (ref 37–54)
HEMOGLOBIN: 12.3 G/DL (ref 12.5–16.5)
IMMATURE GRANULOCYTES #: 0.17 E9/L
IMMATURE GRANULOCYTES %: 1.9 % (ref 0–5)
LYMPHOCYTES ABSOLUTE: 0.93 E9/L (ref 1.5–4)
LYMPHOCYTES RELATIVE PERCENT: 10.3 % (ref 20–42)
MCH RBC QN AUTO: 31 PG (ref 26–35)
MCHC RBC AUTO-ENTMCNC: 33.4 % (ref 32–34.5)
MCV RBC AUTO: 92.7 FL (ref 80–99.9)
MONOCYTES ABSOLUTE: 1.05 E9/L (ref 0.1–0.95)
MONOCYTES RELATIVE PERCENT: 11.6 % (ref 2–12)
NEUTROPHILS ABSOLUTE: 6.47 E9/L (ref 1.8–7.3)
NEUTROPHILS RELATIVE PERCENT: 71.3 % (ref 43–80)
PDW BLD-RTO: 12.9 FL (ref 11.5–15)
PLATELET # BLD: 215 E9/L (ref 130–450)
PMV BLD AUTO: 9.1 FL (ref 7–12)
POTASSIUM SERPL-SCNC: 4.5 MMOL/L (ref 3.5–5)
PRO-BNP: 55 PG/ML (ref 0–450)
RBC # BLD: 3.97 E12/L (ref 3.8–5.8)
SODIUM BLD-SCNC: 138 MMOL/L (ref 132–146)
TROPONIN: <0.01 NG/ML (ref 0–0.03)
WBC # BLD: 9.1 E9/L (ref 4.5–11.5)

## 2020-05-20 PROCEDURE — 85378 FIBRIN DEGRADE SEMIQUANT: CPT

## 2020-05-20 PROCEDURE — 80048 BASIC METABOLIC PNL TOTAL CA: CPT

## 2020-05-20 PROCEDURE — 85025 COMPLETE CBC W/AUTO DIFF WBC: CPT

## 2020-05-20 PROCEDURE — 83880 ASSAY OF NATRIURETIC PEPTIDE: CPT

## 2020-05-20 PROCEDURE — 6360000004 HC RX CONTRAST MEDICATION: Performed by: RADIOLOGY

## 2020-05-20 PROCEDURE — 36415 COLL VENOUS BLD VENIPUNCTURE: CPT

## 2020-05-20 PROCEDURE — 99285 EMERGENCY DEPT VISIT HI MDM: CPT

## 2020-05-20 PROCEDURE — 71275 CT ANGIOGRAPHY CHEST: CPT

## 2020-05-20 PROCEDURE — 84484 ASSAY OF TROPONIN QUANT: CPT

## 2020-05-20 PROCEDURE — 93971 EXTREMITY STUDY: CPT

## 2020-05-20 RX ORDER — SODIUM CHLORIDE 0.9 % (FLUSH) 0.9 %
10 SYRINGE (ML) INJECTION PRN
Status: DISCONTINUED | OUTPATIENT
Start: 2020-05-20 | End: 2020-05-20 | Stop reason: HOSPADM

## 2020-05-20 RX ADMIN — IOPAMIDOL 80 ML: 755 INJECTION, SOLUTION INTRAVENOUS at 15:36

## 2020-05-20 ASSESSMENT — ENCOUNTER SYMPTOMS
DIARRHEA: 0
WHEEZING: 0
SINUS PRESSURE: 0
SWOLLEN GLANDS: 0
SHORTNESS OF BREATH: 1
SPUTUM PRODUCTION: 0
EYE DISCHARGE: 0
EYE PAIN: 0
ABDOMINAL PAIN: 0
NAUSEA: 0
EYE REDNESS: 0
BACK PAIN: 0
SORE THROAT: 0
VOMITING: 0
COUGH: 0

## 2020-05-20 NOTE — ED PROVIDER NOTES
(1988); Hand surgery (Right); Coronary angioplasty with stent (10/03/2017); Total knee arthroplasty (Left, 05/13/2019); Total knee arthroplasty (Right, 08/12/2019); Colonoscopy; and Total hip arthroplasty (Right, 05/13/2020). Social History:  reports that he quit smoking about 17 years ago. His smoking use included cigars. He has a 14.00 pack-year smoking history. He has never used smokeless tobacco. He reports current alcohol use. He reports that he does not use drugs. Family History: family history includes Cancer in his brother, brother, father, and mother; No Known Problems in his brother, brother, child, child, child, sister, and another family member. The patients home medications have been reviewed. Allergies: Patient has no known allergies.     -------------------------------------------------- RESULTS -------------------------------------------------  Labs:  Results for orders placed or performed during the hospital encounter of 05/20/20   CBC Auto Differential   Result Value Ref Range    WBC 9.1 4.5 - 11.5 E9/L    RBC 3.97 3.80 - 5.80 E12/L    Hemoglobin 12.3 (L) 12.5 - 16.5 g/dL    Hematocrit 36.8 (L) 37.0 - 54.0 %    MCV 92.7 80.0 - 99.9 fL    MCH 31.0 26.0 - 35.0 pg    MCHC 33.4 32.0 - 34.5 %    RDW 12.9 11.5 - 15.0 fL    Platelets 680 694 - 667 E9/L    MPV 9.1 7.0 - 12.0 fL    Neutrophils % 71.3 43.0 - 80.0 %    Immature Granulocytes % 1.9 0.0 - 5.0 %    Lymphocytes % 10.3 (L) 20.0 - 42.0 %    Monocytes % 11.6 2.0 - 12.0 %    Eosinophils % 4.5 0.0 - 6.0 %    Basophils % 0.4 0.0 - 2.0 %    Neutrophils Absolute 6.47 1.80 - 7.30 E9/L    Immature Granulocytes # 0.17 E9/L    Lymphocytes Absolute 0.93 (L) 1.50 - 4.00 E9/L    Monocytes Absolute 1.05 (H) 0.10 - 0.95 E9/L    Eosinophils Absolute 0.41 0.05 - 0.50 E9/L    Basophils Absolute 0.04 0.00 - 0.20 Z2/F   Basic Metabolic Panel   Result Value Ref Range    Sodium 138 132 - 146 mmol/L    Potassium 4.5 3.5 - 5.0 mmol/L    Chloride 106 98 - 107

## 2020-05-21 ENCOUNTER — TELEPHONE (OUTPATIENT)
Dept: NON INVASIVE DIAGNOSTICS | Age: 75
End: 2020-05-21

## 2020-05-21 NOTE — TELEPHONE ENCOUNTER
----- Message from Mason Faulkner DO sent at 5/20/2020  8:25 AM EDT -----  Reviewed--I believe he is asymptomatic but I can see him with a VV anywhere he fits in my schedule--tomorrow or Friday is fine. ALK  ----- Message -----  From: Raquel Christopher  Sent: 5/19/2020   3:42 PM EDT  To: Mason Faulkner DO    WS referring to EP urgently for 2:1 block. You saw this patient back in Oct 2017. Please advise how to schedule.  thx

## 2020-05-21 NOTE — PROGRESS NOTES
10/04/2017    Coronary artery disease due to lipid rich plaque      Overview Note:     10/3/17  3.0 x 18-mm Alpine, drug-eluting stent in the mid LAD followed by POTS          Hyperlipidemia     Headache     Tinnitus     Hand pain, right 2014    Bronchitis, acute 2013       Past Medical History:   Diagnosis Date    BPH (benign prostatic hyperplasia)     Bronchitis, acute 2013    CAD (coronary artery disease)     Hand pain, right 2014    and numbing    Headache     Heart block AV second degree     Hyperlipidemia     Hypertension     Tinnitus        Past Surgical History:   Procedure Laterality Date    COLONOSCOPY      CORONARY ANGIOPLASTY WITH STENT PLACEMENT  10/03/2017    Dr Jt Holloway LAD Mid Alpine 3x18mm    HAND SURGERY Right     Carpal tunnel & release trigger finger    HERNIA REPAIR      x2    KNEE ARTHROSCOPY      TONSILLECTOMY AND ADENOIDECTOMY Bilateral     TOTAL HIP ARTHROPLASTY Right 2020    Stephen Ridges    TOTAL KNEE ARTHROPLASTY Left 2019    Dom Stephen Ridges    TOTAL KNEE ARTHROPLASTY Right 2019    Cathaleen Roots VASECTOMY         Family History   Problem Relation Age of Onset    Cancer Mother         Colon     Age 80    Cancer Father         Back [de-identified] Age 68 tumor in the back    No Known Problems Sister     No Known Problems Brother     No Known Problems Other     No Known Problems Child     No Known Problems Child     No Known Problems Child     Cancer Brother         Colon & prostate    No Known Problems Brother     Cancer Brother         Melanoma  age 39       Social History     Tobacco Use    Smoking status: Former Smoker     Packs/day: 1.00     Years: 14.00     Pack years: 14.00     Types: Cigars     Last attempt to quit: 2003     Years since quittin.4    Smokeless tobacco: Never Used    Tobacco comment: occasional   Substance Use Topics    Alcohol use: Yes       Current Outpatient Medications   Medication Sig Dispense Refill    still  significant pinching of the ostium of the diagonal.  Therefore, the  side of the stent _____ were then crossed with a second Luge wire. A  3.0-mm balloon was then used to dilate the side _____ of the stent  with a 3.0 noncomplaint balloon. A second 3.0 noncomplaint balloon  was then used to as a kissing balloon in the LAD. Simultaneous  inflations of the LAD and diagonal balloons were then performed using  a kissing balloon technique once again. Two inflations were  performed. Followup angiogram performed which demonstrated 0%  residual stenosis and excellent LYNDSAY-3 flow. Wires were removed, and  followup angiograms demonstrated again 0% residual stenosis and  excellent LYNDSAY-3 flow. The right radial artery sheath had been  removed and a TR band placed successfully with good patent hemostasis. He tolerated the procedure well with no complications.           Mathieu Guzman DO     D: 10/03/2017 10:11:20       T: 10/03/2017 12:38:21     [email or web address]  Job#: [id number]     Doc#: [id number]    ECG--5/18/20: Sinus rhythm with 2:1 AVB, NAD, normal FL and QT interval. Normal QRSd. No acute ST changes. I have independently reviewed all of the ECGs and rhythm strips per above. I have personally reviewed the laboratory, cardiac diagnostic and radiographic testing as outlined above. I have reviewed previous records noted in [address]. Impression:    1. Mobitz 1/Mobitz II AVB  - no symptoms of fatigue, dizziness, near syncope or syncope  - narrow QRSd  - currently on Brilinta  Lab Results   Component Value Date    TSH 1.800 10/04/2017   - last documented HR of 74 bpm--5/20/20 via ER visit    2. CAD  - LHC as above  - s/p stent to the LAD.     3. HLD  - on Crestor  Lab Results   Component Value Date    CHOL 112 05/06/2020    CHOL 118 11/13/2019    CHOL 139 08/29/2018     Lab Results   Component Value Date    TRIG 75 05/06/2020    TRIG 70 11/13/2019    TRIG 55 08/29/2018     Lab Results

## 2020-05-22 ENCOUNTER — VIRTUAL VISIT (OUTPATIENT)
Dept: NON INVASIVE DIAGNOSTICS | Age: 75
End: 2020-05-22
Payer: MEDICARE

## 2020-05-22 PROCEDURE — 99214 OFFICE O/P EST MOD 30 MIN: CPT | Performed by: INTERNAL MEDICINE

## 2020-05-26 ENCOUNTER — TELEPHONE (OUTPATIENT)
Dept: CARDIOLOGY CLINIC | Age: 75
End: 2020-05-26

## 2020-05-26 RX ORDER — CLOPIDOGREL BISULFATE 75 MG/1
75 TABLET ORAL DAILY
Qty: 90 TABLET | Refills: 3 | Status: SHIPPED
Start: 2020-05-26 | End: 2021-05-17

## 2020-05-26 RX ORDER — CLOPIDOGREL BISULFATE 75 MG/1
75 TABLET ORAL DAILY
COMMUNITY
End: 2020-05-26 | Stop reason: SDUPTHER

## 2020-05-27 ENCOUNTER — VIRTUAL VISIT (OUTPATIENT)
Dept: CARDIOLOGY CLINIC | Age: 75
End: 2020-05-27
Payer: MEDICARE

## 2020-05-27 VITALS
WEIGHT: 205 LBS | SYSTOLIC BLOOD PRESSURE: 118 MMHG | HEART RATE: 78 BPM | HEIGHT: 72 IN | DIASTOLIC BLOOD PRESSURE: 71 MMHG | BODY MASS INDEX: 27.77 KG/M2

## 2020-05-27 PROCEDURE — 99214 OFFICE O/P EST MOD 30 MIN: CPT | Performed by: INTERNAL MEDICINE

## 2020-05-27 RX ORDER — ACETAMINOPHEN 500 MG
500 TABLET ORAL PRN
COMMUNITY

## 2020-05-27 NOTE — PROGRESS NOTES
Damion Diego  1945  Date of Service: 2020    Patient Active Problem List    Diagnosis Date Noted    Chest pain     CAD (coronary artery disease) 10/04/2017    Mobitz (type) I (Wenckebach's) atrioventricular block 10/04/2017    Coronary artery disease due to lipid rich plaque      Overview Note:     10/3/17  3.0 x 18-mm Alpine, drug-eluting stent in the mid LAD followed by POTS          Hyperlipidemia     Headache     Tinnitus     Hand pain, right 2014    Bronchitis, acute 2013       Social History     Socioeconomic History    Marital status:      Spouse name: None    Number of children: None    Years of education: None    Highest education level: None   Occupational History    None   Social Needs    Financial resource strain: None    Food insecurity     Worry: None     Inability: None    Transportation needs     Medical: None     Non-medical: None   Tobacco Use    Smoking status: Former Smoker     Packs/day: 1.00     Years: 14.00     Pack years: 14.00     Types: Cigars     Last attempt to quit: 2003     Years since quittin.4    Smokeless tobacco: Never Used    Tobacco comment: occasional   Substance and Sexual Activity    Alcohol use: Yes     Comment: occasionally    Drug use: No    Sexual activity: Yes     Partners: Female   Lifestyle    Physical activity     Days per week: None     Minutes per session: None    Stress: None   Relationships    Social connections     Talks on phone: None     Gets together: None     Attends Restorationist service: None     Active member of club or organization: None     Attends meetings of clubs or organizations: None     Relationship status: None    Intimate partner violence     Fear of current or ex partner: None     Emotionally abused: None     Physically abused: None     Forced sexual activity: None   Other Topics Concern    None   Social History Narrative    None       Current Outpatient Medications   Medication Sig Dispense Refill    acetaminophen (TYLENOL) 500 MG tablet Take 500 mg by mouth as needed for Pain      rosuvastatin (CRESTOR) 10 MG tablet Take 1 tablet by mouth nightly 90 tablet 1    amLODIPine (NORVASC) 5 MG tablet Take 1 tablet by mouth daily 90 tablet 1    Coenzyme Q10-Levocarnitine (CO Q-10 PLUS PO) Take by mouth      aspirin 81 MG EC tablet Take 1 tablet by mouth daily 30 tablet 3    Multiple Vitamins-Minerals (CENTRUM SILVER ADULT 50+) TABS Take 1 tablet by mouth daily      Omega-3 Fatty Acids (FISH OIL EXTRA STRENGTH PO) Take by mouth daily      Specialty Vitamins Products (PROSTATE PO) Take by mouth daily      clopidogrel (PLAVIX) 75 MG tablet Take 1 tablet by mouth daily (Patient not taking: Reported on 5/27/2020) 90 tablet 3     No current facility-administered medications for this visit. No Known Allergies    Chief Complaint:  Guzman Montague is here today for follow up and management/recomendations for CP, abn stress     History of Present Illness: Guzman Montague had hip surgery 2 weeks ago. He also had right knee replacement pain and left knee replacement in May 2019. He states that he has chronic mild windedness with exertion for over 1 year. He believes that this is more deconditioning and not actual dyspnea. He did have lower extremity swelling after his hip surgery. He denies any chest discomfort while performing any of these activities. A d-dimer was recently checked for this breathing and was found to be abnormal.  He therefore went to the emergency room and had a CT of his chest and lower extremity Doppler. These were negative for DVT and PE. He denies any orthopnea/PND, or lower extremity edema. He denies any palpitations or presyncopal symptoms. REVIEW OF SYSTEMS:  As above. Patient does not complain of any fever, chills, nausea, vomiting or diarrhea. No focal, motor or neurological deficits. No changes in his/her vision, hearing, bowel or bladder habits.   He is not known to have a history of thyroid problems. No recent nose bleeds. PHYSICAL EXAM:  Vitals:    20 1409   BP: 118/71   Pulse: 78   Weight: 205 lb (93 kg)   Height: 6' (1.829 m)     2020    TELEHEALTH EVALUATION -- Audio/Visual (During DAJGT-38 public health emergency)    HPI:    Spencer Nesbitt (:  1945) has requested an audio/video evaluation for the following concern(s):    CAD    Prior to Visit Medications    Medication Sig Taking?  Authorizing Provider   acetaminophen (TYLENOL) 500 MG tablet Take 500 mg by mouth as needed for Pain Yes Historical Provider, MD   rosuvastatin (CRESTOR) 10 MG tablet Take 1 tablet by mouth nightly Yes Bart Sy DO   amLODIPine (NORVASC) 5 MG tablet Take 1 tablet by mouth daily Yes Bart Sy DO   Coenzyme Q10-Levocarnitine (CO Q-10 PLUS PO) Take by mouth Yes Historical Provider, MD   aspirin 81 MG EC tablet Take 1 tablet by mouth daily Yes Seth Zavala DO   Multiple Vitamins-Minerals (CENTRUM SILVER ADULT 50+) TABS Take 1 tablet by mouth daily Yes Historical Provider, MD   Omega-3 Fatty Acids (FISH OIL EXTRA STRENGTH PO) Take by mouth daily Yes Historical Provider, MD   Specialty Vitamins Products (PROSTATE PO) Take by mouth daily Yes Historical Provider, MD   clopidogrel (PLAVIX) 75 MG tablet Take 1 tablet by mouth daily  Patient not taking: Reported on 2020  Seth Zavala DO       Social History     Tobacco Use    Smoking status: Former Smoker     Packs/day: 1.00     Years: 14.00     Pack years: 14.00     Types: Cigars     Last attempt to quit: 2003     Years since quittin.4    Smokeless tobacco: Never Used    Tobacco comment: occasional   Substance Use Topics    Alcohol use: Yes     Comment: occasionally    Drug use: No        PHYSICAL EXAMINATION:  [ INSTRUCTIONS:  \"[x]\" Indicates a positive item  \"[]\" Indicates a negative item  -- DELETE ALL ITEMS NOT EXAMINED]  Vital Signs: (As above)    Constitutional: [x] Appears well-developed and well-nourished [x] No apparent distress      [] Abnormal-   Mental status  [x] Alert and awake  [x] Oriented to person/place/time [x]Able to follow commands      Eyes:  EOM    [x]  Normal  [] Abnormal-  Sclera  [x]  Normal  [] Abnormal -         Discharge [x]  None visible  [] Abnormal -    HENT:   [x] Normocephalic, atraumatic. [] Abnormal   [x] Mouth/Throat: Mucous membranes are moist.     External Ears [x] Normal  [] Abnormal-     Neck: [x] No visualized mass     Pulmonary/Chest: [x] Respiratory effort normal.  [x] No visualized signs of difficulty breathing or respiratory distress        [] Abnormal-      Musculoskeletal:   [] Normal gait with no signs of ataxia         [x] Normal range of motion of neck        [] Abnormal-       Neurological:        [x] No Facial Asymmetry (Cranial nerve 7 motor function) (limited exam to video visit)          [x] No gaze palsy        [] Abnormal-         Skin:        [x] No significant exanthematous lesions or discoloration noted on facial skin         [] Abnormal-            Psychiatric:       [x] Normal Affect [x] No Hallucinations        [] Abnormal-     Other pertinent observable physical exam findings-     ASSESSMENT/PLAN:  There are no diagnoses linked to this encounter. No follow-ups on file. Spencer Nesbitt is a 75 y.o. male being evaluated by a Virtual Visit (video visit) encounter to address concerns as mentioned above. A caregiver was present when appropriate. Due to this being a TeleHealth encounter (During AEQTA-76 public health emergency), evaluation of the following organ systems was limited: Vitals/Constitutional/EENT/Resp/CV/GI//MS/Neuro/Skin/Heme-Lymph-Imm.   Pursuant to the emergency declaration under the [facility] waiver authority and the tomoguides and [employer] Act, this Virtual Visit was conducted with patient's (and/or legal guardian's) consent, to

## 2020-06-26 ENCOUNTER — TELEPHONE (OUTPATIENT)
Dept: NON INVASIVE DIAGNOSTICS | Age: 75
End: 2020-06-26

## 2020-07-06 ENCOUNTER — TELEPHONE (OUTPATIENT)
Dept: CARDIOLOGY CLINIC | Age: 75
End: 2020-07-06

## 2020-07-12 NOTE — PROGRESS NOTES
[address]    Srinivas Chang  1945  Date of Service: 7/15/2020  PCP: Gi Couch DO  Cardiologist: Merline Harrison, DO  Electrophysiologist: Eduardo Teague DO    Reason for Consultation: Mobitz II AVB    10/4/17--Hospital Consultation: Srinivas Chang is a 66 yo male who I was asked to see in Cardiac Electrophysiology consultation for intermittent AVWB. He is a very pleasant 66 yo male with the PMHx as noted below. He presented to the hospital for an elective LHC secondary to an abnormal stress test.  The LHC revealed a LAD lesion which underwent stent placement by Dr. Cherise Delcid yesterday. During the [facility] and after, he was noted to have intermittent AVWB. His OP Toprol XL was put on hold yesterday. This am he continues in sinus rhythm at 65-70 bpm.  He did have AVWB overnight at 3-3:30 am.  He has had no episodes of progressive AVB. He denies any symptoms of CP, dizziness, near syncope or syncope.      05/22/20--VV: Srinivas Chang gives verbal consent for a virtual video visit from his home via my office on the [employer] platform. I saw Mr. Jennifer Kendrick in original consultation as above. At that time, he was noted to have asymptomatic AVNWB. He recently underwent THR and when in [facility], he was seen by Dr. Idris Nettles from [address] [place] [address] for [address] At that time, there was no reported higher degree AVB. He recently saw Dr. Cherise Delcid and was found to be in sinus rhythm with 2:1 AVB which he was asymptomatic. Interestingly, he presented to the ER on 5/20/20 secondary to RLE swelling and concern for DVT. He underwent a RLE US and CTA of the chest, both of which were negative. Of note, no ECG was performed but his HR was reported at 74 bpm.  He denies any symptoms of dizziness, near syncope or syncope.    07/15/20--VV: Srinivas Chang and his wife give verbal consent for a virtual video visit from his home via my  office on the [employer] platform. 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 Today we had an extensive discussion regarding the indications for pacemaker implantation. He states that he is feeling well and is asymptomatic. He denies any chest pain, shortness of breath, orthopnea or PND. He denies any lightheadedness, dizziness, or syncope.       Patient Active Problem List    Diagnosis Date Noted    Chest pain     CAD (coronary artery disease) 10/04/2017    Mobitz (type) I (Wenckebach's) atrioventricular block 10/04/2017    Coronary artery disease due to lipid rich plaque      Overview Note:     10/3/17  3.0 x 18-mm Alpine, drug-eluting stent in the mid LAD followed by POTS          Hyperlipidemia     Headache     Tinnitus     Hand pain, right 09/01/2014    Bronchitis, acute 03/01/2013       Past Medical History:   Diagnosis Date    BPH (benign prostatic hyperplasia)     Bronchitis, acute 03/2013    CAD (coronary artery disease)     Hand pain, right 09/2014    and numbing    Headache     Heart block AV second degree     mobitz    Hyperlipidemia     Hypertension     Mobitz I     Tinnitus        Past Surgical History:   Procedure Laterality Date    COLONOSCOPY      CORONARY ANGIOPLASTY WITH STENT PLACEMENT  10/03/2017    Dr Janice Hogan LAD Mid Alpine 3x18mm    HAND SURGERY Right     Carpal tunnel & release trigger finger    HERNIA REPAIR      x2    KNEE ARTHROSCOPY  1988    SKIN CANCER EXCISION  01/2020    left cheek/right bottom eyelid    TONSILLECTOMY AND ADENOIDECTOMY Bilateral     TOTAL HIP ARTHROPLASTY Right 05/13/2020    Monica Nixon    TOTAL KNEE ARTHROPLASTY Left 05/13/2019    Dom Monica Nixon    TOTAL KNEE ARTHROPLASTY Right 08/12/2019    Lucrecia Butt VASECTOMY         Family History   Problem Relation Age of Onset    Cancer Mother         Colon     Age 80    Cancer Father         Back [de-identified] Age 68 tumor in the back    No Known Problems Sister     No Known Problems Brother     No Known Problems Other     No Known Problems Child     No Known Problems Child     No Known Problems Child     Cancer Brother         Colon & prostate    No Known Problems Brother     Cancer Brother         Melanoma  age 39       Social History     Tobacco Use    Smoking status: Former Smoker     Packs/day: 1.00     Years: 14.00     Pack years: 14.00     Types: Cigars     Last attempt to quit:      Years since quittin.5    Smokeless tobacco: Never Used    Tobacco comment: occasional   Substance Use Topics    Alcohol use: Yes     Comment: occasionally       Current Outpatient Medications   Medication Sig Dispense Refill    acetaminophen (TYLENOL) 500 MG tablet Take 500 mg by mouth as needed for Pain      clopidogrel (PLAVIX) 75 MG tablet Take 1 tablet by mouth daily (Patient not taking: Reported on 2020) 90 tablet 3    rosuvastatin (CRESTOR) 10 MG tablet Take 1 tablet by mouth nightly 90 tablet 1    amLODIPine (NORVASC) 5 MG tablet Take 1 tablet by mouth daily 90 tablet 1    Coenzyme Q10-Levocarnitine (CO Q-10 PLUS PO) Take by mouth      aspirin 81 MG EC tablet Take 1 tablet by mouth daily 30 tablet 3    Multiple Vitamins-Minerals (CENTRUM SILVER ADULT 50+) TABS Take 1 tablet by mouth daily      Omega-3 Fatty Acids (FISH OIL EXTRA STRENGTH PO) Take by mouth daily      Specialty Vitamins Products (PROSTATE PO) Take by mouth daily       No current facility-administered medications for this visit. No Known Allergies    ROS:   Constitutional: Negative for fever, activity change and appetite change. HENT: Negative for epistaxis, difficulty swallowing. Eyes: Negative for blurred vision or double vision. Respiratory: Negative for cough, chest tightness, shortness of breath and wheezing. Cardiovascular: per HPI. Gastrointestinal: Negative for abdominal pain, heartburn, or blood in stool. Genitourinary: Negative for hematuria, burning or frequency. Musculoskeletal: Negative for myalgias, stiffness, or swelling. Skin: Negative for rash, pain, or lumps. Neurological: Negative for syncope, seizures, or headaches. Psychiatric/Behavioral: Negative for confusion and agitation. The patient is not nervous/anxious. PHYSICAL EXAM:  Constitutional: Oriented to person, place, and time. Well-developed and cooperative. Head: Normocephalic and atraumatic. Musculoskeletal: Normal range of motion of all extremities, no muscle weakness. Neurological: Alert and oriented to person, place, and time. Skin: Skin is warm and dry. No bruising, no ecchymosis and no rash noted. Extremity: No clubbing or cyanosis. No edema. Psychiatric: Normal mood and affect. Thought content normal.     Pertinent Labs:  CBC:   No results for input(s): WBC, HGB, HCT, PLT in the last 72 hours. BMP:  No results for input(s): NA, K, CL, CO2, BUN, CREATININE, CALCIUM in the last 72 hours. Invalid input(s): GLU  ABGs: No results found for: PHART, PO2ART, JIB9YQP  INR:   No results for input(s): INR in the last 72 hours. BNP: No results for input(s): BNP in the last 72 hours. TSH:   Lab Results   Component Value Date    TSH 1.800 10/04/2017      Cardiac Injury Profile:   No results for input(s): CKTOTAL, CKMB, CKMBINDEX, TROPONINI in the last 72 hours. Lipid Profile:   Lab Results   Component Value Date    TRIG 75 05/06/2020    HDL 36 05/06/2020    LDLCALC 59 05/06/2020    CHOL 112 05/06/2020      Hemoglobin A1C: No components found for: HGBA1C   Xray:   No orders to display     Pertinent Cardiac Testing:     Preventice Patch MCOT--6/2020:  Cardiac Electrophysiology Cardiac Monitor Report     Leana Po  1945   Date of Service: 7/1/2020   PCP: Bj George DO   Electrophysiologist: Mook Kong DO     1. Sinus rhythm/sinus bradycardia. 2. Intermittent Mobitz I and Mobitz II AVB. 3. 5 beats of NSVT. 4. No AF or SVT.      Mook Kong DO   [facility] Cardiac Electrophysiology   [address] [facility](10/3/17): Sameer Kellogg                 [address] [place] [address],  [facility]                            CARDIAC CATHETERIZATION     PATIENT NAME: Gladys Harrison                          :             1945  MED REC NO:   [id number]                             ROOM:            [facility]  ACCOUNT NO:   [de-identified]                           ADMISSION DATE:  10/03/2017  PROVIDER:     Karina Stephenson DO     DATE OF PROCEDURE:  10/03/2017     PROCEDURES:  1. Left heart catheterization. 2.  Percutaneous coronary intervention with a 3.0 x 18-mm Alpine,  drug-eluting stent in the mid LAD followed by POTS technique and  kissing balloon for the diagonal side branch.     INDICATIONS:  Unstable angina with an abnormal stress test.     COMPLICATIONS:  None.     SEDATION:  Intravenous Versed.     SEDATION TIME:  I was present for sedation administration at 0745 and  I ended sedation at 0930 for a total face-to-face sedation time of 1  hour and 45 minutes.     HEMODYNAMICS:  1. Opening aortic pressure 109/59 with a mean of 80.  2.  Left ventricular systolic pressure 979.  3.  LVEDP 22.  4.  No significant gradient across the aortic valve.     ANGIOGRAPHIC RESULTS:  1. Left main:  Short. No angiographically significant stenosis. 2.  LAD:  Very tortous vessel, mid, eccentric, 80-85% diffuse stenosis  right where the second diagonal takes off.  3.  D1:  No angiographically significant stenosis. 4.  D2:  :Large vessel. No angiographically significant stenosis. 5.  Circumflex:  No angiographically significant stenosis. 6.  OM1:  Tiny vessel. 7.  OM2:  Tiny vessel. 8.  OM3:  Large vessel. No angiographically significant stenosis. 9.  Right coronary artery:  Dominant vessel. Proximal diffuse 30%  stenosis. 10.  Left ventriculogram:  Normal LV size and systolic function.   No  wall motion abnormalities. Ejection fraction of 60-65%.     INTERVENTIONAL RESULTS:  1. Successful pre-dilatation of the mid LAD with a 3.0-mm balloon. 2.  Successful stenting of mid LAD with a 3.0 x 18-mm Alpine,  drug-eluting stent. The proximal portion of the stent was then  post-dilated using a POTS technique up to the take-off of the second  diagonal.  This was post-dilated with a 3.5-mm noncompliant balloon. The site of the stent was then opened with a 3.0-mm noncompliant  balloon into the diagonal.  A kissing balloon technique was then used  to post-dilate. These all resulted in 0% residual stenosis and  excellent LYNDSAY-3 flow.     SUMMARY OF PROCEDURE:  After obtaining informed consent, the patient  was taken to the cardiac cath lab and the area of the right radial  artery was prepped and draped in a sterile fashion. Using a  micropuncture technique, a 6-Beninese Deetta Bue was placed in  the right radial artery. This was aspirated and flushed several times  throughout the procedure. This was medicated with verapamil and  nitroglycerin. He was given heparin systemically. An exchanging  Wholey wire was used for all exchanges, and a 5-Kazakh TIG catheter  was advanced over the wire to the radial artery. This was aspirated  and flushed with saline. Pressures were obtained. This was then  filled with contrast and manipulated in the left main coronary artery. Four orthogonal views were obtained. The catheter was then  re-manipulated in the right coronary artery. Three orthogonal views  were obtained. The catheter was then removed, and a 5-Beninese angled  pigtail advanced and manipulated in the left ventricle. The catheter  was then aspirated and flushed with saline once again. Pulmonary  pressures across the aortic valve were obtained. He was  anticoagulated to maintain an ACT greater than 200, and he was  pre-loaded with Brilinta. He was already on aspirin.   A 6-Beninese Q4  guiding catheter was advanced over the wire to the aorta. This was  aspirated and flushed with saline filled with contrast and manipulated  in the left main coronary artery. A Luge wire was successfully  manipulated across the lesion and to the distal LAD. The lesion was  then successfully crossed and pre-dilated with a 3.0-mm balloon. The  lesion was then successfully crossed and stented with a 3.0 x 18-mm  Alpine, drug-eluting stent to 11 atmospheres of pressure. The  proximal half of the stent was then attempted to be crossed and  post-dilated with a noncompliant 3.5-mm balloon. However, this  balloon would not cross the proximal portion of the stent. The  balloon had been removed, and a 3.5 Wilkinson balloon was used. This  was also very difficult in crossing. The guide had to be seated  deeper and with few manipulations, the Wilkinson was able to pass and  the proximal portion of the stent was post-dilated with the 3.5-mm  Wilkinson at 10 atmospheres of pressure. This allowed the noncomplaint  balloon to pass and the proximal portion of the stent up to the  take-off of the diagonal was then post-dilated with the noncomplaint  balloon, again using the same POTS technique. Followup angiogram  demonstrated the stent to be widely patent; however, there was still  significant pinching of the ostium of the diagonal.  Therefore, the  side of the stent _____ were then crossed with a second Luge wire. A  3.0-mm balloon was then used to dilate the side _____ of the stent  with a 3.0 noncomplaint balloon. A second 3.0 noncomplaint balloon  was then used to as a kissing balloon in the LAD. Simultaneous  inflations of the LAD and diagonal balloons were then performed using  a kissing balloon technique once again. Two inflations were  performed. Followup angiogram performed which demonstrated 0%  residual stenosis and excellent LYNDSAY-3 flow.   Wires were removed, and  followup angiograms demonstrated again 0% residual stenosis and  excellent LYNDSAY-3 flow. The right radial artery sheath had been  removed and a TR band placed successfully with good patent hemostasis. He tolerated the procedure well with no complications.           Chetna Callahan DO     D: 10/03/2017 10:11:20       T: 10/03/2017 12:38:21     [email or web address]  Job#: [id number]     Doc#: [id number]    ECG--5/18/20: Sinus rhythm with 2:1 AVB, NAD, normal TN and QT interval. Normal QRSd. No acute ST changes. I have independently reviewed all of the ECGs and rhythm strips per above. I have personally reviewed the laboratory, cardiac diagnostic and radiographic testing as outlined above. I have reviewed previous records noted in [address]. Impression:    1. Mobitz 1/Mobitz II AVB  - no symptoms of fatigue, dizziness, near syncope or syncope  - narrow QRSd  - currently on Brilinta  Lab Results   Component Value Date    TSH 1.800 10/04/2017   - last documented HR of 74 bpm--5/20/20 via ER visit  - repeat MCOT--6/2020: Frequent episodes of Mobitz II AVB    2. CAD  - LHC as above  - s/p stent to the LAD. 3. HLD  - on Crestor  Lab Results   Component Value Date    CHOL 112 05/06/2020    CHOL 118 11/13/2019    CHOL 139 08/29/2018     Lab Results   Component Value Date    TRIG 75 05/06/2020    TRIG 70 11/13/2019    TRIG 55 08/29/2018     Lab Results   Component Value Date    HDL 36 05/06/2020    HDL 40 11/13/2019    HDL 48 08/29/2018     Lab Results   Component Value Date    LDLCALC 59 05/06/2020    LDLCALC 64 11/13/2019    LDLCALC 80 08/29/2018     Lab Results   Component Value Date    LABVLDL 14 11/13/2019    LABVLDL 11 08/29/2018    LABVLDL 16 05/03/2017     No results found for: CHOLHDLRATIO     4. HTN  - Norvasc    5. S/P THR    Recommendations:    Mr. Josh Leija has a history of Mobitz 1 AVB and recently had an ECG of Mobitz II AVB. He underwent a Preventice patch MCOT in June which revealed very frequent episodes of Mobitz II AVB.   Today we discussed current

## 2020-07-15 ENCOUNTER — VIRTUAL VISIT (OUTPATIENT)
Dept: NON INVASIVE DIAGNOSTICS | Age: 75
End: 2020-07-15
Payer: MEDICARE

## 2020-07-15 PROCEDURE — 99214 OFFICE O/P EST MOD 30 MIN: CPT | Performed by: INTERNAL MEDICINE

## 2020-07-17 RX ORDER — AMLODIPINE BESYLATE 5 MG/1
5 TABLET ORAL DAILY
Qty: 90 TABLET | Refills: 1 | Status: SHIPPED
Start: 2020-07-17 | End: 2021-01-11

## 2020-07-22 ENCOUNTER — TELEPHONE (OUTPATIENT)
Dept: NON INVASIVE DIAGNOSTICS | Age: 75
End: 2020-07-22

## 2020-07-22 NOTE — TELEPHONE ENCOUNTER
----- Message from Navdi Beard DO sent at 7/15/2020  8:56 AM EDT -----  Mr. Mercedes Long needs a pacemaker but he wants to think about it first.  If he calls in agreement, he will need a dual chamber PPM with Misael. Thanks.     ALK

## 2020-08-27 ENCOUNTER — TELEPHONE (OUTPATIENT)
Dept: NON INVASIVE DIAGNOSTICS | Age: 75
End: 2020-08-27

## 2020-08-27 NOTE — TELEPHONE ENCOUNTER
----- Message from Delores Wagner MD sent at 8/27/2020  9:47 AM EDT -----  Either way is fine. Thanks.  ----- Message -----  From: Flaquita Argueta  Sent: 8/27/2020   8:17 AM EDT  To: Delores Wagner MD    Would you like the echo the morning of the procedure or call our echo dept to see how soon they could get patient in?   ----- Message -----  From: Delores Wagner MD  Sent: 8/26/2020   4:32 PM EDT  To: Flaquita Argueta    Could you please get urgent echo before the procedure as well. Thanks.  ----- Message -----  From: Flaquita Argueta  Sent: 8/26/2020   3:59 PM EDT  To: Delores Wagner MD    ALK pt, to be scheduled for dual chamber ppm implt. Ok to schedule you with?  Please advise, thanks

## 2020-09-17 ENCOUNTER — HOSPITAL ENCOUNTER (OUTPATIENT)
Age: 75
Discharge: HOME | End: 2020-09-19
Payer: MEDICARE

## 2020-09-17 PROCEDURE — U0003 INFECTIOUS AGENT DETECTION BY NUCLEIC ACID (DNA OR RNA); SEVERE ACUTE RESPIRATORY SYNDROME CORONAVIRUS 2 (SARS-COV-2) (CORONAVIRUS DISEASE [COVID-19]), AMPLIFIED PROBE TECHNIQUE, MAKING USE OF HIGH THROUGHPUT TECHNOLOGIES AS DESCRIBED BY CMS-2020-01-R: HCPCS

## 2020-09-19 LAB
SARS-COV-2: NOT DETECTED
SOURCE: NORMAL

## 2020-09-22 ENCOUNTER — TELEPHONE (OUTPATIENT)
Dept: NON INVASIVE DIAGNOSTICS | Age: 75
End: 2020-09-22

## 2020-09-23 ENCOUNTER — ANESTHESIA EVENT (OUTPATIENT)
Dept: CARDIAC CATH/INVASIVE PROCEDURES | Age: 75
End: 2020-09-23

## 2020-09-23 ENCOUNTER — ANESTHESIA (OUTPATIENT)
Dept: CARDIAC CATH/INVASIVE PROCEDURES | Age: 75
End: 2020-09-23

## 2020-09-23 ENCOUNTER — HOSPITAL ENCOUNTER (OUTPATIENT)
Dept: GENERAL RADIOLOGY | Age: 75
Discharge: HOME | End: 2020-09-25
Payer: MEDICARE

## 2020-09-23 ENCOUNTER — HOSPITAL ENCOUNTER (OUTPATIENT)
Dept: NON INVASIVE DIAGNOSTICS | Age: 75
Discharge: HOME | End: 2020-09-23
Payer: MEDICARE

## 2020-09-23 ENCOUNTER — HOSPITAL ENCOUNTER (OUTPATIENT)
Dept: CARDIAC CATH/INVASIVE PROCEDURES | Age: 75
Setting detail: OBSERVATION
Discharge: HOME | End: 2020-09-24
Attending: INTERNAL MEDICINE | Admitting: INTERNAL MEDICINE
Payer: MEDICARE

## 2020-09-23 VITALS — OXYGEN SATURATION: 96 % | SYSTOLIC BLOOD PRESSURE: 129 MMHG | DIASTOLIC BLOOD PRESSURE: 71 MMHG

## 2020-09-23 LAB
ANION GAP SERPL CALCULATED.3IONS-SCNC: 11 MMOL/L (ref 7–16)
BASOPHILS ABSOLUTE: 0.02 E9/L (ref 0–0.2)
BASOPHILS RELATIVE PERCENT: 0.4 % (ref 0–2)
BUN BLDV-MCNC: 21 MG/DL (ref 8–23)
CALCIUM SERPL-MCNC: 9.2 MG/DL (ref 8.6–10.2)
CHLORIDE BLD-SCNC: 107 MMOL/L (ref 98–107)
CO2: 22 MMOL/L (ref 22–29)
CREAT SERPL-MCNC: 0.9 MG/DL (ref 0.7–1.2)
EKG ATRIAL RATE: 61 BPM
EKG P AXIS: 4 DEGREES
EKG P-R INTERVAL: 234 MS
EKG Q-T INTERVAL: 452 MS
EKG QRS DURATION: 142 MS
EKG QTC CALCULATION (BAZETT): 455 MS
EKG R AXIS: -77 DEGREES
EKG T AXIS: 83 DEGREES
EKG VENTRICULAR RATE: 61 BPM
EOSINOPHILS ABSOLUTE: 0.1 E9/L (ref 0.05–0.5)
EOSINOPHILS RELATIVE PERCENT: 1.9 % (ref 0–6)
GFR AFRICAN AMERICAN: >60
GFR NON-AFRICAN AMERICAN: >60 ML/MIN/1.73
GLUCOSE BLD-MCNC: 98 MG/DL (ref 74–99)
HCT VFR BLD CALC: 41 % (ref 37–54)
HEMOGLOBIN: 14.1 G/DL (ref 12.5–16.5)
IMMATURE GRANULOCYTES #: 0.02 E9/L
IMMATURE GRANULOCYTES %: 0.4 % (ref 0–5)
LV EF: 58 %
LVEF MODALITY: NORMAL
LYMPHOCYTES ABSOLUTE: 0.77 E9/L (ref 1.5–4)
LYMPHOCYTES RELATIVE PERCENT: 14.5 % (ref 20–42)
MAGNESIUM: 2.1 MG/DL (ref 1.6–2.6)
MCH RBC QN AUTO: 30.3 PG (ref 26–35)
MCHC RBC AUTO-ENTMCNC: 34.4 % (ref 32–34.5)
MCV RBC AUTO: 88.2 FL (ref 80–99.9)
MONOCYTES ABSOLUTE: 0.74 E9/L (ref 0.1–0.95)
MONOCYTES RELATIVE PERCENT: 13.9 % (ref 2–12)
NEUTROPHILS ABSOLUTE: 3.66 E9/L (ref 1.8–7.3)
NEUTROPHILS RELATIVE PERCENT: 68.9 % (ref 43–80)
PDW BLD-RTO: 13.1 FL (ref 11.5–15)
PLATELET # BLD: 173 E9/L (ref 130–450)
PMV BLD AUTO: 8.8 FL (ref 7–12)
POTASSIUM SERPL-SCNC: 4.3 MMOL/L (ref 3.5–5)
RBC # BLD: 4.65 E12/L (ref 3.8–5.8)
SODIUM BLD-SCNC: 140 MMOL/L (ref 132–146)
WBC # BLD: 5.3 E9/L (ref 4.5–11.5)

## 2020-09-23 PROCEDURE — 2580000003 HC RX 258: Performed by: INTERNAL MEDICINE

## 2020-09-23 PROCEDURE — 6360000002 HC RX W HCPCS: Performed by: NURSE ANESTHETIST, CERTIFIED REGISTERED

## 2020-09-23 PROCEDURE — 3700000000 HC ANESTHESIA ATTENDED CARE

## 2020-09-23 PROCEDURE — 6360000002 HC RX W HCPCS

## 2020-09-23 PROCEDURE — 93005 ELECTROCARDIOGRAM TRACING: CPT | Performed by: INTERNAL MEDICINE

## 2020-09-23 PROCEDURE — 2580000003 HC RX 258: Performed by: NURSE ANESTHETIST, CERTIFIED REGISTERED

## 2020-09-23 PROCEDURE — 33208 INSRT HEART PM ATRIAL & VENT: CPT | Performed by: INTERNAL MEDICINE

## 2020-09-23 PROCEDURE — 3700000001 HC ADD 15 MINUTES (ANESTHESIA)

## 2020-09-23 PROCEDURE — 85025 COMPLETE CBC W/AUTO DIFF WBC: CPT

## 2020-09-23 PROCEDURE — 83735 ASSAY OF MAGNESIUM: CPT

## 2020-09-23 PROCEDURE — C1898 LEAD, PMKR, OTHER THAN TRANS: HCPCS

## 2020-09-23 PROCEDURE — 2709999900 HC NON-CHARGEABLE SUPPLY

## 2020-09-23 PROCEDURE — G0379 DIRECT REFER HOSPITAL OBSERV: HCPCS

## 2020-09-23 PROCEDURE — 80048 BASIC METABOLIC PNL TOTAL CA: CPT

## 2020-09-23 PROCEDURE — C1894 INTRO/SHEATH, NON-LASER: HCPCS

## 2020-09-23 PROCEDURE — 36415 COLL VENOUS BLD VENIPUNCTURE: CPT

## 2020-09-23 PROCEDURE — C1785 PMKR, DUAL, RATE-RESP: HCPCS

## 2020-09-23 PROCEDURE — 2580000003 HC RX 258

## 2020-09-23 PROCEDURE — G0378 HOSPITAL OBSERVATION PER HR: HCPCS

## 2020-09-23 PROCEDURE — 71045 X-RAY EXAM CHEST 1 VIEW: CPT

## 2020-09-23 PROCEDURE — 93306 TTE W/DOPPLER COMPLETE: CPT

## 2020-09-23 PROCEDURE — 2500000003 HC RX 250 WO HCPCS

## 2020-09-23 PROCEDURE — 6360000002 HC RX W HCPCS: Performed by: INTERNAL MEDICINE

## 2020-09-23 PROCEDURE — C1769 GUIDE WIRE: HCPCS

## 2020-09-23 RX ORDER — SODIUM CHLORIDE 0.9 % (FLUSH) 0.9 %
10 SYRINGE (ML) INJECTION EVERY 12 HOURS SCHEDULED
Status: DISCONTINUED | OUTPATIENT
Start: 2020-09-23 | End: 2020-09-24 | Stop reason: HOSPADM

## 2020-09-23 RX ORDER — PROPOFOL 10 MG/ML
INJECTION, EMULSION INTRAVENOUS PRN
Status: DISCONTINUED | OUTPATIENT
Start: 2020-09-23 | End: 2020-09-23 | Stop reason: SDUPTHER

## 2020-09-23 RX ORDER — MIDAZOLAM HYDROCHLORIDE 1 MG/ML
INJECTION INTRAMUSCULAR; INTRAVENOUS PRN
Status: DISCONTINUED | OUTPATIENT
Start: 2020-09-23 | End: 2020-09-23 | Stop reason: SDUPTHER

## 2020-09-23 RX ORDER — SODIUM CHLORIDE 9 MG/ML
INJECTION, SOLUTION INTRAVENOUS CONTINUOUS PRN
Status: DISCONTINUED | OUTPATIENT
Start: 2020-09-23 | End: 2020-09-23 | Stop reason: SDUPTHER

## 2020-09-23 RX ORDER — ONDANSETRON 2 MG/ML
4 INJECTION INTRAMUSCULAR; INTRAVENOUS EVERY 6 HOURS PRN
Status: DISCONTINUED | OUTPATIENT
Start: 2020-09-23 | End: 2020-09-24 | Stop reason: HOSPADM

## 2020-09-23 RX ORDER — PROPOFOL 10 MG/ML
INJECTION, EMULSION INTRAVENOUS CONTINUOUS PRN
Status: DISCONTINUED | OUTPATIENT
Start: 2020-09-23 | End: 2020-09-23 | Stop reason: SDUPTHER

## 2020-09-23 RX ORDER — SODIUM CHLORIDE 9 MG/ML
INJECTION, SOLUTION INTRAVENOUS ONCE
Status: COMPLETED | OUTPATIENT
Start: 2020-09-23 | End: 2020-09-23

## 2020-09-23 RX ORDER — CEFAZOLIN SODIUM 1 G/3ML
INJECTION, POWDER, FOR SOLUTION INTRAMUSCULAR; INTRAVENOUS PRN
Status: DISCONTINUED | OUTPATIENT
Start: 2020-09-23 | End: 2020-09-23 | Stop reason: SDUPTHER

## 2020-09-23 RX ORDER — SODIUM CHLORIDE 0.9 % (FLUSH) 0.9 %
10 SYRINGE (ML) INJECTION PRN
Status: DISCONTINUED | OUTPATIENT
Start: 2020-09-23 | End: 2020-09-24 | Stop reason: HOSPADM

## 2020-09-23 RX ORDER — FENTANYL CITRATE 50 UG/ML
INJECTION, SOLUTION INTRAMUSCULAR; INTRAVENOUS PRN
Status: DISCONTINUED | OUTPATIENT
Start: 2020-09-23 | End: 2020-09-23 | Stop reason: SDUPTHER

## 2020-09-23 RX ADMIN — FENTANYL CITRATE 50 MCG: 50 INJECTION, SOLUTION INTRAMUSCULAR; INTRAVENOUS at 10:15

## 2020-09-23 RX ADMIN — FENTANYL CITRATE 25 MCG: 50 INJECTION, SOLUTION INTRAMUSCULAR; INTRAVENOUS at 10:30

## 2020-09-23 RX ADMIN — SODIUM CHLORIDE: 9 INJECTION, SOLUTION INTRAVENOUS at 09:15

## 2020-09-23 RX ADMIN — CEFAZOLIN 2000 MG: 1 INJECTION, POWDER, FOR SOLUTION INTRAMUSCULAR; INTRAVENOUS at 10:20

## 2020-09-23 RX ADMIN — Medication 2 G: at 18:36

## 2020-09-23 RX ADMIN — PROPOFOL 30 MG: 10 INJECTION, EMULSION INTRAVENOUS at 10:20

## 2020-09-23 RX ADMIN — SODIUM CHLORIDE, PRESERVATIVE FREE 10 ML: 5 INJECTION INTRAVENOUS at 21:19

## 2020-09-23 RX ADMIN — FENTANYL CITRATE 25 MCG: 50 INJECTION, SOLUTION INTRAMUSCULAR; INTRAVENOUS at 10:38

## 2020-09-23 RX ADMIN — SODIUM CHLORIDE: 9 INJECTION, SOLUTION INTRAVENOUS at 10:00

## 2020-09-23 RX ADMIN — PROPOFOL 25 MCG/KG/MIN: 10 INJECTION, EMULSION INTRAVENOUS at 10:20

## 2020-09-23 RX ADMIN — FENTANYL CITRATE 50 MCG: 50 INJECTION, SOLUTION INTRAMUSCULAR; INTRAVENOUS at 10:10

## 2020-09-23 RX ADMIN — MIDAZOLAM 1 MG: 1 INJECTION INTRAMUSCULAR; INTRAVENOUS at 10:10

## 2020-09-23 ASSESSMENT — PULMONARY FUNCTION TESTS
PIF_VALUE: 0
PIF_VALUE: 12
PIF_VALUE: 4
PIF_VALUE: 13
PIF_VALUE: 13
PIF_VALUE: 12
PIF_VALUE: 12
PIF_VALUE: 13
PIF_VALUE: 12
PIF_VALUE: 13
PIF_VALUE: 12
PIF_VALUE: 0
PIF_VALUE: 12
PIF_VALUE: 13
PIF_VALUE: 12
PIF_VALUE: 0
PIF_VALUE: 12
PIF_VALUE: 12
PIF_VALUE: 13
PIF_VALUE: 12
PIF_VALUE: 13
PIF_VALUE: 12
PIF_VALUE: 12
PIF_VALUE: 16
PIF_VALUE: 13
PIF_VALUE: 12
PIF_VALUE: 1
PIF_VALUE: 13
PIF_VALUE: 12
PIF_VALUE: 1
PIF_VALUE: 12
PIF_VALUE: 13
PIF_VALUE: 12
PIF_VALUE: 3
PIF_VALUE: 12
PIF_VALUE: 13
PIF_VALUE: 12
PIF_VALUE: 12
PIF_VALUE: 13
PIF_VALUE: 12
PIF_VALUE: 13
PIF_VALUE: 12
PIF_VALUE: 12
PIF_VALUE: 0
PIF_VALUE: 1
PIF_VALUE: 12
PIF_VALUE: 13
PIF_VALUE: 13
PIF_VALUE: 12
PIF_VALUE: 12
PIF_VALUE: 13
PIF_VALUE: 12
PIF_VALUE: 12
PIF_VALUE: 13
PIF_VALUE: 13
PIF_VALUE: 0
PIF_VALUE: 12
PIF_VALUE: 0
PIF_VALUE: 1
PIF_VALUE: 13
PIF_VALUE: 12

## 2020-09-23 ASSESSMENT — PAIN SCALES - GENERAL
PAINLEVEL_OUTOF10: 0
PAINLEVEL_OUTOF10: 0

## 2020-09-23 NOTE — PLAN OF CARE
Problem: Discharge Planning:  Goal: Discharged to appropriate level of care  Description: Discharged to appropriate level of care  Outcome: Met This Shift

## 2020-09-23 NOTE — H&P
platform. 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 Today we had an extensive discussion regarding the indications for pacemaker implantation. He states that he is feeling well and is asymptomatic. He denies any chest pain, shortness of breath, orthopnea or PND. He denies any lightheadedness, dizziness, or syncope.       Patient Active Problem List    Diagnosis Date Noted    Chest pain     CAD (coronary artery disease) 10/04/2017    Mobitz (type) I (Wenckebach's) atrioventricular block 10/04/2017    Coronary artery disease due to lipid rich plaque      Overview Note:     10/3/17  3.0 x 18-mm Alpine, drug-eluting stent in the mid LAD followed by POTS          Hyperlipidemia     Headache     Tinnitus     Hand pain, right 09/01/2014    Bronchitis, acute 03/01/2013       Past Medical History:   Diagnosis Date    BPH (benign prostatic hyperplasia)     Bronchitis, acute 03/2013    CAD (coronary artery disease)     Cancer (HCC)     skin    Hand pain, right 09/2014    and numbing    Headache     Heart block AV second degree     mobitz    Hyperlipidemia     Hypertension     Mobitz I     Tinnitus        Past Surgical History:   Procedure Laterality Date    COLONOSCOPY      CORONARY ANGIOPLASTY WITH STENT PLACEMENT  10/03/2017    Dr Gerold Kayser LAD Mid Alpine 3x18mm    HAND SURGERY Right     Carpal tunnel & release trigger finger    HERNIA REPAIR      x2    JOINT REPLACEMENT      KNEE ARTHROSCOPY  1988    SKIN BIOPSY      SKIN CANCER EXCISION  01/2020    left cheek/right bottom eyelid    TONSILLECTOMY AND ADENOIDECTOMY Bilateral     TOTAL HIP ARTHROPLASTY Right 05/13/2020    Jessenia Crease    TOTAL KNEE ARTHROPLASTY Left 05/13/2019    Dom Jessenia Crease    TOTAL KNEE ARTHROPLASTY Right 08/12/2019    [place] [facility] VASECTOMY         Family History   Problem Relation Age of Onset    Cancer Mother         Colon     Age 80    Cancer Father         Back [de-identified] Age 68 tumor in the back    No Known Problems Sister     No Known Problems Brother     No Known Problems Other     No Known Problems Child     No Known Problems Child     No Known Problems Child     Cancer Brother         Colon & prostate    No Known Problems Brother     Cancer Brother         Melanoma  age 39       Social History     Tobacco Use    Smoking status: Former Smoker     Packs/day: 1.00     Years: 14.00     Pack years: 14.00     Types: Cigars     Last attempt to quit:      Years since quittin.7    Smokeless tobacco: Never Used    Tobacco comment: occasional   Substance Use Topics    Alcohol use: Yes     Comment: occasionally       Current Outpatient Medications   Medication Sig Dispense Refill    amLODIPine (NORVASC) 5 MG tablet Take 1 tablet by mouth daily 90 tablet 1    acetaminophen (TYLENOL) 500 MG tablet Take 500 mg by mouth as needed for Pain      clopidogrel (PLAVIX) 75 MG tablet Take 1 tablet by mouth daily 90 tablet 3    rosuvastatin (CRESTOR) 10 MG tablet Take 1 tablet by mouth nightly 90 tablet 1    Coenzyme Q10-Levocarnitine (CO Q-10 PLUS PO) Take by mouth      aspirin 81 MG EC tablet Take 1 tablet by mouth daily 30 tablet 3    Multiple Vitamins-Minerals (CENTRUM SILVER ADULT 50+) TABS Take 1 tablet by mouth daily      Omega-3 Fatty Acids (FISH OIL EXTRA STRENGTH PO) Take 1,000 mg by mouth daily       Specialty Vitamins Products (PROSTATE PO) Take by mouth daily       No current facility-administered medications for this encounter. No Known Allergies    ROS:   Constitutional: Negative for fever, activity change and appetite change. HENT: Negative for epistaxis, difficulty swallowing. Eyes: Negative for blurred vision or double vision. Respiratory: Negative for cough, chest tightness, shortness of breath and wheezing. Cardiovascular: per HPI. Gastrointestinal: Negative for abdominal pain, heartburn, or blood in stool.    Genitourinary: Negative for hematuria, burning or frequency. Musculoskeletal: Negative for myalgias, stiffness, or swelling. Skin: Negative for rash, pain, or lumps. Neurological: Negative for syncope, seizures, or headaches. Psychiatric/Behavioral: Negative for confusion and agitation. The patient is not nervous/anxious. PHYSICAL EXAM:  Constitutional: Oriented to person, place, and time. Well-developed and cooperative. Head: Normocephalic and atraumatic. Musculoskeletal: Normal range of motion of all extremities, no muscle weakness. Neurological: Alert and oriented to person, place, and time. Skin: Skin is warm and dry. No bruising, no ecchymosis and no rash noted. Extremity: No clubbing or cyanosis. No edema. Psychiatric: Normal mood and affect. Thought content normal.     Pertinent Labs:  CBC:   Recent Labs     09/23/20  0900   WBC 5.3   HGB 14.1   HCT 41.0        BMP:  Recent Labs     09/23/20  0900      K 4.3      CO2 22   BUN 21   CREATININE 0.9   CALCIUM 9.2     ABGs: No results found for: PHART, PO2ART, EPZ1SWP  INR:   No results for input(s): INR in the last 72 hours. BNP: No results for input(s): BNP in the last 72 hours. TSH:   Lab Results   Component Value Date    TSH 1.800 10/04/2017      Cardiac Injury Profile:   No results for input(s): CKTOTAL, CKMB, CKMBINDEX, TROPONINI in the last 72 hours. Lipid Profile:   Lab Results   Component Value Date    TRIG 75 05/06/2020    HDL 36 05/06/2020    LDLCALC 59 05/06/2020    CHOL 112 05/06/2020      Hemoglobin A1C: No components found for: HGBA1C   Xray:   No orders to display     Pertinent Cardiac Testing:     Pre[employer] Patch MCOT--6/2020:  Cardiac Electrophysiology Cardiac Monitor Report     Chetan Ritter  1945   Date of Service: 7/1/2020   PCP: Su Hope DO   Electrophysiologist: Gomez Torres DO     1. Sinus rhythm/sinus bradycardia. 2. Intermittent Mobitz I and Mobitz II AVB. 3. 5 beats of NSVT. 4. No AF or SVT. Ambar Maguire. Tammie Fitzgerald, [address] [facility](10/3/17): [address]                 [address] [place] [address],  [facility]                            CARDIAC CATHETERIZATION     PATIENT NAME: Yandel Fu                          :             1945  MED REC NO:   [id number]                             ROOM:            [facility]  ACCOUNT NO:   [de-identified]                           ADMISSION DATE:  10/03/2017  PROVIDER:     Kvng Santana DO     DATE OF PROCEDURE:  10/03/2017     PROCEDURES:  1. Left heart catheterization. 2.  Percutaneous coronary intervention with a 3.0 x 18-mm Alpine,  drug-eluting stent in the mid LAD followed by POTS technique and  kissing balloon for the diagonal side branch.     INDICATIONS:  Unstable angina with an abnormal stress test.     COMPLICATIONS:  None.     SEDATION:  Intravenous Versed.     SEDATION TIME:  I was present for sedation administration at 0745 and  I ended sedation at 0930 for a total face-to-face sedation time of 1  hour and 45 minutes.     HEMODYNAMICS:  1. Opening aortic pressure 109/59 with a mean of 80.  2.  Left ventricular systolic pressure 423.  3.  LVEDP 22.  4.  No significant gradient across the aortic valve.     ANGIOGRAPHIC RESULTS:  1. Left main:  Short. No angiographically significant stenosis. 2.  LAD:  Very tortous vessel, mid, eccentric, 80-85% diffuse stenosis  right where the second diagonal takes off.  3.  D1:  No angiographically significant stenosis. 4.  D2:  :Large vessel. No angiographically significant stenosis. 5.  Circumflex:  No angiographically significant stenosis. 6.  OM1:  Tiny vessel. 7.  OM2:  Tiny vessel. 8.  OM3:  Large vessel. No angiographically significant stenosis. 9.  Right coronary artery:  Dominant vessel. Proximal diffuse 30%  stenosis. 10.  Left ventriculogram:  Normal LV size and systolic function. No  wall motion abnormalities. Ejection fraction of 60-65%.     INTERVENTIONAL RESULTS:  1. Successful pre-dilatation of the mid LAD with a 3.0-mm balloon. 2.  Successful stenting of mid LAD with a 3.0 x 18-mm Alpine,  drug-eluting stent. The proximal portion of the stent was then  post-dilated using a POTS technique up to the take-off of the second  diagonal.  This was post-dilated with a 3.5-mm noncompliant balloon. The site of the stent was then opened with a 3.0-mm noncompliant  balloon into the diagonal.  A kissing balloon technique was then used  to post-dilate. These all resulted in 0% residual stenosis and  excellent LYNDSAY-3 flow.     SUMMARY OF PROCEDURE:  After obtaining informed consent, the patient  was taken to the cardiac cath lab and the area of the right radial  artery was prepped and draped in a sterile fashion. Using a  micropuncture technique, a 6-Ecuadorean Junnie Hey was placed in  the right radial artery. This was aspirated and flushed several times  throughout the procedure. This was medicated with verapamil and  nitroglycerin. He was given heparin systemically. An exchanging  Wholey wire was used for all exchanges, and a 5-Afghan TIG catheter  was advanced over the wire to the radial artery. This was aspirated  and flushed with saline. Pressures were obtained. This was then  filled with contrast and manipulated in the left main coronary artery. Four orthogonal views were obtained. The catheter was then  re-manipulated in the right coronary artery. Three orthogonal views  were obtained. The catheter was then removed, and a 5-Ecuadorean angled  pigtail advanced and manipulated in the left ventricle. The catheter  was then aspirated and flushed with saline once again. Pulmonary  pressures across the aortic valve were obtained. He was  anticoagulated to maintain an ACT greater than 200, and he was  pre-loaded with Brilinta. He was already on aspirin.   A 6-Ecuadorean Q4  guiding catheter was advanced over the wire to the aorta. This was  aspirated and flushed with saline filled with contrast and manipulated  in the left main coronary artery. A Luge wire was successfully  manipulated across the lesion and to the distal LAD. The lesion was  then successfully crossed and pre-dilated with a 3.0-mm balloon. The  lesion was then successfully crossed and stented with a 3.0 x 18-mm  Alpine, drug-eluting stent to 11 atmospheres of pressure. The  proximal half of the stent was then attempted to be crossed and  post-dilated with a noncompliant 3.5-mm balloon. However, this  balloon would not cross the proximal portion of the stent. The  balloon had been removed, and a 3.5 [place] balloon was used. This  was also very difficult in crossing. The guide had to be seated  deeper and with few manipulations, the [place] was able to pass and  the proximal portion of the stent was post-dilated with the 3.5-mm  [place] at 10 atmospheres of pressure. This allowed the noncomplaint  balloon to pass and the proximal portion of the stent up to the  take-off of the diagonal was then post-dilated with the noncomplaint  balloon, again using the same POTS technique. Followup angiogram  demonstrated the stent to be widely patent; however, there was still  significant pinching of the ostium of the diagonal.  Therefore, the  side of the stent _____ were then crossed with a second Luge wire. A  3.0-mm balloon was then used to dilate the side _____ of the stent  with a 3.0 noncomplaint balloon. A second 3.0 noncomplaint balloon  was then used to as a kissing balloon in the LAD. Simultaneous  inflations of the LAD and diagonal balloons were then performed using  a kissing balloon technique once again. Two inflations were  performed. Followup angiogram performed which demonstrated 0%  residual stenosis and excellent LYNDSAY-3 flow.   Wires were removed, and  followup angiograms demonstrated again 0% residual current indications for pacemaker implantation for frequent Mobitz II AV block. Risks, benefits, and alternatives of permanent pacemaker implantation were discussed in detail today. These risks include but are not limited to bleeding, blood clot, infection, pneumothorax, hemothorax, cardiac perforation and tamponade, and death. After our discussion, the patient and his wife like to think about their options. He also underwent recent total knee replacement and wants to make sure that if he is agreeable to pacemaker implantation that he does not need to wait a specific time prior to pacemaker implantation. He will be discussing this with Dr. Vivian Scott in the near future. At present, I did not make any changes in his medications. I will await to hear his decision. Thank you for allowing me to participate in your patient's care. I have spent a total of 30 minutes with the patient via a video visit reviewing the above stated recommendations. A total of >50% of that time involved face-to-face time providing counseling and or coordination of care with the other providers. Bryanna Burrell DO  [facility] Cardiac Electrophysiology  . [facility]    Due to this being a TeleHealth encounter, evaluation of organ systems is limited. Pursuant to the emergency declaration under the [facility], [facility] waiver authority and the [employer] General Act, this Virtual  Visit was conducted, with patient's consent, to reduce the patient's risk of exposure to COVID-19 and provide continuity of care for an established patient. Services were provided through a video synchronous discussion virtually to substitute for in-person clinic visit. Addendum:    Office note reviewed. No change has been made. The patient seen and examined.  Risks, benefits, and alternatives of permanent pacemaker implantation were discussed in detail today. These risks include but are not limited to bleeding, infection, blood clot, pneumothorax, hemothorax,cardiac valve damage, cardiac perforation and tamponade required emergent thoracotomy, contrast induced nephropathy leading to short or even long term dialysis, vascular injury requiring emergent surgical repair, lead dislodgement, stroke and even death. The patient understands these risks and agrees to proceed with pacemaker implantation.       Kaylee Westbrook MD  Cardiac Electrophysiology  [address]  [facility] [place]: [place] [facility]  10:16 AM  9/23/2020

## 2020-09-23 NOTE — OP NOTE
performed prior to beginning the procedure. The patient was prepped and draped in usual sterile fashion. The left subclavian venogram was performed and showed patent subclavian vein. Twenty mL of 2% lidocaine was used to anesthetize the left pectoral area. Using a #10 blade a 4-cm incision was made below the left clavicle. Using combination of manual dissection and electrocautery, the pacemaker pocket was created to approximate the size of the patient's device. Hemostasis was achieved with electrocautery and confirmed visually. Using a modified Seldinger technique and a fluroscopic guidance, 2 guide wires were placed in the left subclavian vein. Over one of the short J-tipped guide wire, a 6-Fr Safe-sheath was passed. Through this, the right ventricular lead was advanced without difficulty to the right ventricular septum using a combination of straight and curved stylets and under fluoroscopic guidance. Mapping of the lead was performed and the lead parameters were deemed to be adequate. The lead was then actively fixated in place. The 6-Fr Safe-sheath was then removed. The lead was then sewn to the pre-pectoral fascia using 0 Ethibond sutures, suturing over the suture sleeve. Over the retained guide-wire an additional 6-Polish sheath was passed. Through this, the right atrial lead was advanced without difficulty to the right atrial appendage using a combination of straight and curved stylets and under fluoroscopic guidance. Mapping of the lead was performed. Lead parameters were deemed to be adequate and lead was then actively fixated in place. The 6-Fr Safe- sheath was then removed. The lead was then sewn to the pre-pectoral fascia using 0 Ethibond sutures, suturing over the suture sleeve. The device pocket was then irrigated with antibiotic solution. The leads were then attached to the appropriate binding posts on the header of the device. The set screws were then tightened with a torque wrench.  Tug tests were performed on both leads to insure they are adequately fixated. The device and the leads were then placed within the newly formed device pocket. Lead parameters were then rechecked via the device and deemed to be adequate. The pulse generator was also tied to the pre-pectoral fascia using 0-Ethibond. Surgiflo was injected into the pocket to ensure hemostasis. The incision was closed with 3 layers of absorbable suture, Vicryl. The deepest of which was a 2-0 interrupted stitch followed by a 2nd layer of 3-0 running stitch performed perpendicular to the deep layer and, lastly, a 4-0 subcuticular stitch. The skin was then prepped with benzoin solution, Steri-Strips, and island dressing were then applied. At the end of the case, the patient was hemodynamically stable and under the care of the anesthesiologist, the patient was brought back to the recovery area for post procedural monitoring. ASSESSMENT:  1. Successful implantation of a [employer] dual chamber permanent pacemaker. RECOMMENDATIONS:  1. Stat portable chest x-ray to rule out pneumothorax and check lead placement now and tomorrow morning. 2. EKG to be performed now. 3. Post-procedural monitoring in the recovery area. 4. The patient will be observed overnight on Telemetry. 5. The patient will receive 24-hours of jennifer-operative intravenous antibiotics. 6. The patient's device will be interrogated in the morning by a representative of the device . 7. The patient is to follow-up in device clinic within 2 weeks upon discharge. 8. The patient is advised follow all post-operative device and wound care instructions as per the information provided in the pre-operative clinic.     Monica Boothe MD  Cardiac Electrophysiology  [address]  [facility] [place]: [place] [facility]  10:17 AM  9/23/2020

## 2020-09-23 NOTE — ANESTHESIA PRE PROCEDURE
Department of Anesthesiology  Preprocedure Note       Name:  John Wolf   Age:  67 y.o.  :  1945                                          MRN:  [id number]         Date:  2020      Surgeon: * Surgery not found *    Procedure:     Medications prior to admission:   Prior to Admission medications    Medication Sig Start Date End Date Taking?  Authorizing Provider   amLODIPine (NORVASC) 5 MG tablet Take 1 tablet by mouth daily 20  Yes Bart Sy, DO   acetaminophen (TYLENOL) 500 MG tablet Take 500 mg by mouth as needed for Pain   Yes Historical Provider, MD   clopidogrel (PLAVIX) 75 MG tablet Take 1 tablet by mouth daily 20  Yes Marya Castro,    rosuvastatin (CRESTOR) 10 MG tablet Take 1 tablet by mouth nightly 20  Yes Bart Sy, DO   Coenzyme Q10-Levocarnitine (CO Q-10 PLUS PO) Take by mouth   Yes Historical Provider, MD   aspirin 81 MG EC tablet Take 1 tablet by mouth daily 10/4/17  Yes Marya Castro,    Multiple Vitamins-Minerals (CENTRUM SILVER ADULT 50+) TABS Take 1 tablet by mouth daily   Yes Historical Provider, MD   Omega-3 Fatty Acids (FISH OIL EXTRA STRENGTH PO) Take 1,000 mg by mouth daily    Yes Historical Provider, MD   Specialty Vitamins Products (PROSTATE PO) Take by mouth daily   Yes Historical Provider, MD       Current medications:    Current Outpatient Medications   Medication Sig Dispense Refill    amLODIPine (NORVASC) 5 MG tablet Take 1 tablet by mouth daily 90 tablet 1    acetaminophen (TYLENOL) 500 MG tablet Take 500 mg by mouth as needed for Pain      clopidogrel (PLAVIX) 75 MG tablet Take 1 tablet by mouth daily 90 tablet 3    rosuvastatin (CRESTOR) 10 MG tablet Take 1 tablet by mouth nightly 90 tablet 1    Coenzyme Q10-Levocarnitine (CO Q-10 PLUS PO) Take by mouth      aspirin 81 MG EC tablet Take 1 tablet by mouth daily 30 tablet 3    Multiple Vitamins-Minerals (CENTRUM SILVER ADULT 50+) TABS Take 1 tablet by mouth daily      Omega-3 Fatty Acids (FISH OIL EXTRA STRENGTH PO) Take 1,000 mg by mouth daily       Specialty Vitamins Products (PROSTATE PO) Take by mouth daily       No current facility-administered medications for this encounter.         Allergies:  No Known Allergies    Problem List:    Patient Active Problem List   Diagnosis Code    Hyperlipidemia E78.5    Hand pain, right M79.641    Bronchitis, acute J20.9    Headache R51    Tinnitus H93.19    CAD (coronary artery disease) I25.10    Mobitz (type) I (Wenckebach's) atrioventricular block I44.1    Coronary artery disease due to lipid rich plaque I25.10, I25.83    Chest pain R07.9       Past Medical History:        Diagnosis Date    BPH (benign prostatic hyperplasia)     Bronchitis, acute 2013    CAD (coronary artery disease)     Cancer (HCC)     skin    Hand pain, right 2014    and numbing    Headache     Heart block AV second degree     mobitz    Hyperlipidemia     Hypertension     Mobitz I     Tinnitus        Past Surgical History:        Procedure Laterality Date    COLONOSCOPY      CORONARY ANGIOPLASTY WITH STENT PLACEMENT  10/03/2017    Dr Carmen KING Mid Alpine 3x18mm    HAND SURGERY Right     Carpal tunnel & release trigger finger    HERNIA REPAIR      x2    JOINT REPLACEMENT      KNEE ARTHROSCOPY  1988    SKIN BIOPSY      SKIN CANCER EXCISION  2020    left cheek/right bottom eyelid    TONSILLECTOMY AND ADENOIDECTOMY Bilateral     TOTAL HIP ARTHROPLASTY Right 2020    Denece     TOTAL KNEE ARTHROPLASTY Left 2019    Dom Denece     TOTAL KNEE ARTHROPLASTY Right 2019    [place] VASECTOMY         Social History:    Social History     Tobacco Use    Smoking status: Former Smoker     Packs/day: 1.00     Years: 14.00     Pack years: 14.00     Types: Cigars     Last attempt to quit: 2003     Years since quittin.7    Smokeless tobacco: Never Used    Tobacco comment: occasional   Substance Use Topics    Alcohol

## 2020-09-24 ENCOUNTER — APPOINTMENT (OUTPATIENT)
Dept: GENERAL RADIOLOGY | Age: 75
End: 2020-09-24
Attending: INTERNAL MEDICINE
Payer: MEDICARE

## 2020-09-24 VITALS
BODY MASS INDEX: 26.51 KG/M2 | RESPIRATION RATE: 16 BRPM | HEIGHT: 73 IN | TEMPERATURE: 97.4 F | DIASTOLIC BLOOD PRESSURE: 72 MMHG | WEIGHT: 200 LBS | HEART RATE: 70 BPM | SYSTOLIC BLOOD PRESSURE: 151 MMHG | OXYGEN SATURATION: 96 %

## 2020-09-24 PROBLEM — Z95.0 PACEMAKER: Status: ACTIVE | Noted: 2020-09-24

## 2020-09-24 LAB
ANION GAP SERPL CALCULATED.3IONS-SCNC: 12 MMOL/L (ref 7–16)
BUN BLDV-MCNC: 18 MG/DL (ref 8–23)
CALCIUM SERPL-MCNC: 9.1 MG/DL (ref 8.6–10.2)
CHLORIDE BLD-SCNC: 103 MMOL/L (ref 98–107)
CO2: 25 MMOL/L (ref 22–29)
CREAT SERPL-MCNC: 0.9 MG/DL (ref 0.7–1.2)
GFR AFRICAN AMERICAN: >60
GFR NON-AFRICAN AMERICAN: >60 ML/MIN/1.73
GLUCOSE BLD-MCNC: 104 MG/DL (ref 74–99)
HCT VFR BLD CALC: 44.8 % (ref 37–54)
HEMOGLOBIN: 15.1 G/DL (ref 12.5–16.5)
MAGNESIUM: 2.3 MG/DL (ref 1.6–2.6)
MCH RBC QN AUTO: 30.4 PG (ref 26–35)
MCHC RBC AUTO-ENTMCNC: 33.7 % (ref 32–34.5)
MCV RBC AUTO: 90.3 FL (ref 80–99.9)
PDW BLD-RTO: 13.2 FL (ref 11.5–15)
PLATELET # BLD: 171 E9/L (ref 130–450)
PMV BLD AUTO: 8.9 FL (ref 7–12)
POTASSIUM SERPL-SCNC: 4.9 MMOL/L (ref 3.5–5)
RBC # BLD: 4.96 E12/L (ref 3.8–5.8)
SODIUM BLD-SCNC: 140 MMOL/L (ref 132–146)
WBC # BLD: 8.2 E9/L (ref 4.5–11.5)

## 2020-09-24 PROCEDURE — 6360000002 HC RX W HCPCS: Performed by: INTERNAL MEDICINE

## 2020-09-24 PROCEDURE — 71045 X-RAY EXAM CHEST 1 VIEW: CPT

## 2020-09-24 PROCEDURE — 80048 BASIC METABOLIC PNL TOTAL CA: CPT

## 2020-09-24 PROCEDURE — G0378 HOSPITAL OBSERVATION PER HR: HCPCS

## 2020-09-24 PROCEDURE — 83735 ASSAY OF MAGNESIUM: CPT

## 2020-09-24 PROCEDURE — 96374 THER/PROPH/DIAG INJ IV PUSH: CPT

## 2020-09-24 PROCEDURE — 99024 POSTOP FOLLOW-UP VISIT: CPT | Performed by: STUDENT IN AN ORGANIZED HEALTH CARE EDUCATION/TRAINING PROGRAM

## 2020-09-24 PROCEDURE — 2580000003 HC RX 258: Performed by: INTERNAL MEDICINE

## 2020-09-24 PROCEDURE — 85027 COMPLETE CBC AUTOMATED: CPT

## 2020-09-24 PROCEDURE — 36415 COLL VENOUS BLD VENIPUNCTURE: CPT

## 2020-09-24 RX ADMIN — SODIUM CHLORIDE, PRESERVATIVE FREE 10 ML: 5 INJECTION INTRAVENOUS at 08:35

## 2020-09-24 RX ADMIN — Medication 2 G: at 02:33

## 2020-09-24 ASSESSMENT — PAIN DESCRIPTION - LOCATION: LOCATION: CHEST;SHOULDER

## 2020-09-24 ASSESSMENT — PAIN DESCRIPTION - PAIN TYPE: TYPE: ACUTE PAIN

## 2020-09-24 ASSESSMENT — PAIN SCALES - GENERAL
PAINLEVEL_OUTOF10: 0
PAINLEVEL_OUTOF10: 0
PAINLEVEL_OUTOF10: 2

## 2020-09-24 ASSESSMENT — PAIN DESCRIPTION - FREQUENCY: FREQUENCY: INTERMITTENT

## 2020-09-24 ASSESSMENT — PAIN - FUNCTIONAL ASSESSMENT: PAIN_FUNCTIONAL_ASSESSMENT: ACTIVITIES ARE NOT PREVENTED

## 2020-09-24 ASSESSMENT — PAIN DESCRIPTION - DESCRIPTORS: DESCRIPTORS: ACHING;SORE

## 2020-09-24 ASSESSMENT — PAIN DESCRIPTION - ORIENTATION: ORIENTATION: LEFT

## 2020-09-24 NOTE — PROGRESS NOTES
CLINICAL PHARMACY: DISCHARGE MED RECONCILIATION/REVIEW    [facility] Select Patient?: Yes  Total # of Interventions Recommended: 0   -   Total # Interventions Accepted: 0  Intervention Severity:   - Level 1 Intervention Present?: No   - Level 2 #: 0   - Level 3 #: 0   Time Spent (min): 5    Additional Documentation: See progress note.

## 2020-09-24 NOTE — CARE COORDINATION
Met with pt at bedside; technican was completing PM interrogation and stated pt was good to go; pt reports from home with spouse Zeny , independent of all ADL; denies DME or needs; active ; verified pcp and pharmacy (pt uses [facility] in [place] for immediate med needs); Zeny  to provide transportation home at discharge. Anticipate discharge before noon today.

## 2020-09-24 NOTE — PROGRESS NOTES
Cardiac Electrophysiology Inpatient Progress Note    Dylon Glynn  1945  Date of Service: 9/24/2020  PCP: Curtis Sykes DO  Cardiologist: Juanito Luna DO  Electrophysiologist: Joey Patel DO     Subjective: Dylon Glynn is seen in hospital follow-up and management of: Mobitz II AVB s/p dual chamber device placement     09/24/2020: Dylon Glynn is seen in hospital follow up: he underwent placement of a dual chamber pacemaker yesterday today he device site is free of edema and ecchymosis, his dressing is clean dry and intact. He complains of slight discomfort around the device, his device interrogation was showed normal function. Patient Active Problem List   Diagnosis    Hyperlipidemia    Hand pain, right    Bronchitis, acute    Headache    Tinnitus    CAD (coronary artery disease)    Mobitz (type) I (Wenckebach's) atrioventricular block    Coronary artery disease due to lipid rich plaque    Chest pain    Second degree AV block, Mobitz type II         Scheduled Medications:   sodium chloride flush  10 mL Intravenous 2 times per day       Infusion Medications:      PRN Medications:  sodium chloride flush, ondansetron    No Known Allergies    Wt Readings from Last 3 Encounters:   09/23/20 200 lb (90.7 kg)   05/27/20 205 lb (93 kg)   05/20/20 205 lb (93 kg)     Temp Readings from Last 3 Encounters:   09/24/20 97.4 °F (36.3 °C) (Temporal)   05/20/20 98.1 °F (36.7 °C) (Oral)   05/05/20 97.7 °F (36.5 °C)     BP Readings from Last 3 Encounters:   09/24/20 (!) 151/72   09/23/20 129/71   05/27/20 118/71     Pulse Readings from Last 3 Encounters:   09/24/20 70   05/27/20 78   05/20/20 78         Intake/Output Summary (Last 24 hours) at 9/24/2020 1103  Last data filed at 9/24/2020 0857  Gross per 24 hour   Intake 950 ml   Output 0 ml   Net 950 ml       ROS:   Constitutional: Negative for fever, activity change and appetite change. HENT: Negative for epistaxis, difficulty swallowing.    Eyes: Negative for blurred vision or double vision. Respiratory: Negative for cough, chest tightness, shortness of breath and wheezing. Cardiovascular: per HPI. Gastrointestinal: Negative for abdominal pain, heartburn, or blood in stool. Genitourinary: Negative for hematuria, burning or frequency. Musculoskeletal: Negative for myalgias, stiffness, or swelling. Skin: Negative for rash, pain, or lumps. Neurological: Negative for syncope, seizures, or headaches. Psychiatric/Behavioral: Negative for confusion and agitation. The patient is not nervous/anxious. PHYSICAL EXAM:  Vitals:    09/23/20 2115 09/24/20 0015 09/24/20 0315 09/24/20 0815   BP: (!) 168/79 (!) 173/81 (!) 140/80 (!) 151/72   Pulse: 66 61 60 70   Resp: 18 16 16 16   Temp: 97.1 °F (36.2 °C) 99.2 °F (37.3 °C) 98.7 °F (37.1 °C) 97.4 °F (36.3 °C)   TempSrc: Temporal Temporal Temporal Temporal   SpO2: 94% 95% 94% 96%   Weight:       Height:         Constitutional: Oriented to person, place, and time. Well-developed and cooperative. Head: Normocephalic and atraumatic. Musculoskeletal: Normal range of motion of all extremities, no muscle weakness. Neurological: Alert and oriented to person, place, and time. Skin: Skin is warm and dry. No bruising, no ecchymosis and no rash noted. Extremity: No clubbing or cyanosis. No edema. Psychiatric: Normal mood and affect. Thought content normal.    Device site: dressing is clean dry and intact, no edema or brusing    Pertinent Labs:  CBC:   Recent Labs     09/23/20  0900 09/24/20  0637   WBC 5.3 8.2   HGB 14.1 15.1   HCT 41.0 44.8    171     BMP:  Recent Labs     09/23/20  0900 09/24/20  0637    140   K 4.3 4.9    103   CO2 22 25   BUN 21 18   CREATININE 0.9 0.9   CALCIUM 9.2 9.1     ABGs: No results found for: PHART, PO2ART, ZAC9SIF  INR: No results for input(s): INR in the last 72 hours. BNP: No results for input(s): BNP in the last 72 hours.    TSH:   Lab Results   Component Value Date    TSH 1.800 10/04/2017      Cardiac Injury Profile: No results for input(s): CKTOTAL, CKMB, CKMBINDEX, TROPONINI in the last 72 hours. Lipid Profile:   Lab Results   Component Value Date    TRIG 75 2020    HDL 36 2020    LDLCALC 59 2020    CHOL 112 2020      Hemoglobin A1C: No components found for: HGBA1C   Xray: Xr Chest Portable    Result Date: 2020  Patient MRN: [id number] : 1945 Age:  67 years Gender: Male Order Date: 2020 7:45 AM Exam: XR CHEST PORTABLE Number of Images: 1 views Indication:   post pacer post pacer Comparison: Yesterday's exam Findings: Portable anterior view demonstrates no change bipolar pacemaker leads. Heart is normal in size with normal pulmonary vascularity. Lungs are clear. No acute process and no interim change     Xr Chest Portable    Result Date: 2020  Patient MRN: [id number] : 1945 Age:  67 years Gender: Male Order Date: 2020 12:25 PM Exam: XR CHEST PORTABLE Number of Images: 1 views Indication:   Pacemaker placement and rule out pneumothorax Pacemaker placement and rule out pneumothorax Comparison: CT scan 2020 Findings: Portable anterior view demonstrates interim placement left-sided pacemaker with leads projected at level of atrium and ventricle. No immediate complicating feature evident. Heart is slightly enlarged with normal pulmonary vascularity. Lungs are clear. Placement bipolar pacemaker without immediate complicating feature         Pertinent Cardiac Testing:     Preventice Patch MCOT--2020:1. Sinus rhythm/sinus bradycardia. 2. Intermittent Mobitz I and Mobitz II AVB. 3. 5 beats of NSVT. 4. No AF or SVT. LHC(10/3/17): Left main:  Short.  No angiographically significant stenosis.   LAD:  Very tortous vessel, mid, eccentric, 80-85% diffuse stenosis right where the second diagonal takes off.  D1:  No angiographically significant stenosis. D2:  :Large vessel.  No angiographically significant stenosis. Circumflex:  No angiographically significant stenosis.  OM1:  Tiny vessel. OM2:  Tiny vessel. OM3:  Large vessel.  No angiographically significant stenosis. Right coronary artery:  Dominant vessel.  Proximal diffuse 30% stenosis. Left ventriculogram:  Normal LV size and systolic function.  No wall motion abnormalities.  Ejection fraction of 60-65%.     EKG 09/23/2020: AS- , QRS 142ms, QTc 455ms       Telemetry9/24/2020: AS-     Device Interrogation ( 09/24/2020 )  Make/Model Yub Accolade MRI  Mode DDDR 60/120  P wave: 4.4 mV  Impedance: 638 ohms   Threshold: 1.4 V @ 0.4 ms  RV R wave: paced Impedance: 692 ohms   Threshold: 0.8 V @ 0.4 ms  Pacing: A: 40%  RV: 100%   Battery Voltage/Longevity:  11 + years     Arrhythmias: none   Reprogramming included none   Overall device function is normal  All device programmable settings were evaluated per above and in the scanned document, along with iterative adjustments (capture thresholds) to assess and select the most appropriate final programming to provide for consistent delivery of the appropriate therapy and to verify function of the device. I have independently reviewed all of the ECGs and rhythm strips per above. I have personally reviewed the laboratory, cardiac diagnostic and radiographic testing as outlined above. I have reviewed previous records noted in [address] [facility]. Impression:    1. Mobitz 1/Mobitz II AVB  - No symptoms of fatigue, dizziness, near syncope or syncope  - Narrow QRSd  - Currently on Brilinta  - Last documented HR of 74 bpm--5/20/20 via ER visit  - Repeat MCOT--6/2020: Frequent episodes of Mobitz II AVB    2. Pacemaker In situ   - DOI 09/23/2020  - [facility]/Dr. Leyla Forrest     3. CAD  - LHC as above  - s/p stent to the LAD. 4. HLD  - on Crestor    5. HTN  - Norvasc     6. S/P THR    Recommendations:    1. Divina Mendez for discharge today   2. Standard wound care and arm restrictions   3.  Follow up in

## 2020-09-24 NOTE — DISCHARGE SUMMARY
Insertion of Dual Chamber Permanent Pacemaker ([address] conditional)  2. Left upper extremity venography  3. Fluoroscopy    Consultants: none     Disposition: The patient was discharged to home in stable condition on the above medications. Clinical follow-up in the office in 2 weeks. Discussed with Dr. Arlen Gray, MOSES - CNP  Cardiac Electrophysiology  [facility]) Physicians  [facility]: [facility]  11:24 AM  9/24/2020     Attending Supervising Physicians Attestation Statement  I was present with the nurse practitioner during the history and exam. I discussed the findings and plans with the nurse practitioner and agree as documented in his note .     Electronically signed by Benson Vela DO on 9/24/20 at 2:43 PM EDT

## 2020-09-25 ENCOUNTER — TELEPHONE (OUTPATIENT)
Dept: ADMINISTRATIVE | Age: 75
End: 2020-09-25

## 2020-09-25 NOTE — TELEPHONE ENCOUNTER
I called Lara Thornton to see how he's doing since his PPM insertion on 9/23/20. He states he's doing fine & that the aquacel dressing remains intact. He denies any fevers, redness, bleeding, drainage, or swelling from PPM incision site. I reminded him to remove the aquacel dsg on Wednesday 9/30/20 , not to touch the steri-strips, & to continue to wear his sling at night for 4 weeks; along with not abducting the L arm above the shoulder. He's also aware of his follow up appt at the [address] [place] [address] Drive on 10/7/20 at 10 am.  He offers no complaints & is thankful for the phone call.  I'm also mailing out the PPM informational packet since I did not see the patient while he was in the hospital.

## 2020-09-30 ENCOUNTER — OFFICE VISIT (OUTPATIENT)
Dept: PRIMARY CARE CLINIC | Age: 75
End: 2020-09-30
Payer: MEDICARE

## 2020-09-30 VITALS
DIASTOLIC BLOOD PRESSURE: 82 MMHG | WEIGHT: 209 LBS | TEMPERATURE: 97.8 F | OXYGEN SATURATION: 96 % | SYSTOLIC BLOOD PRESSURE: 136 MMHG | HEART RATE: 74 BPM | BODY MASS INDEX: 27.57 KG/M2

## 2020-09-30 PROCEDURE — 99212 OFFICE O/P EST SF 10 MIN: CPT | Performed by: FAMILY MEDICINE

## 2020-09-30 RX ORDER — AMOXICILLIN 500 MG/1
CAPSULE ORAL
COMMUNITY
Start: 2020-07-06 | End: 2021-05-13

## 2020-09-30 ASSESSMENT — ENCOUNTER SYMPTOMS: SHORTNESS OF BREATH: 0

## 2020-09-30 NOTE — PROGRESS NOTES
Bernard Colindres, a male of 67 y.o. came to the office 9/30/2020. Patient Active Problem List   Diagnosis    Hyperlipidemia    Hand pain, right    Bronchitis, acute    Headache    Tinnitus    CAD (coronary artery disease)    Mobitz (type) I (Wenckebach's) atrioventricular block    Coronary artery disease due to lipid rich plaque    Chest pain    Second degree AV block, Mobitz type II    Pacemaker          HPI heart block: recent pacemaker implanted 1 wk ago and doing well without issues. No Known Allergies    Current Outpatient Medications on File Prior to Visit   Medication Sig Dispense Refill    amoxicillin (AMOXIL) 500 MG capsule TAKE 4 CAPSULES BY MOUTH 1 HOUR BEFORE APPOINTMENT      amLODIPine (NORVASC) 5 MG tablet Take 1 tablet by mouth daily 90 tablet 1    acetaminophen (TYLENOL) 500 MG tablet Take 500 mg by mouth as needed for Pain      clopidogrel (PLAVIX) 75 MG tablet Take 1 tablet by mouth daily 90 tablet 3    rosuvastatin (CRESTOR) 10 MG tablet Take 1 tablet by mouth nightly 90 tablet 1    Coenzyme Q10-Levocarnitine (CO Q-10 PLUS PO) Take by mouth      aspirin 81 MG EC tablet Take 1 tablet by mouth daily 30 tablet 3    Multiple Vitamins-Minerals (CENTRUM SILVER ADULT 50+) TABS Take 1 tablet by mouth daily      Omega-3 Fatty Acids (FISH OIL EXTRA STRENGTH PO) Take 1,000 mg by mouth daily       Specialty Vitamins Products (PROSTATE PO) Take by mouth daily       No current facility-administered medications on file prior to visit. Review of Systems   Constitutional: Negative for chills, fatigue and fever. Respiratory: Negative for shortness of breath. Cardiovascular: Negative for chest pain, palpitations and leg swelling. other review of systems reviewed and are negative    OBJECTIVE:  /82   Pulse 74   Temp 97.8 °F (36.6 °C)   Wt 209 lb (94.8 kg)   SpO2 96%   BMI 27.57 kg/m²      Physical Exam  Constitutional:       General: He is not in acute distress.   Eyes: General: No scleral icterus. Conjunctiva/sclera: Conjunctivae normal.   Neck:      Musculoskeletal: Neck supple. Thyroid: No thyromegaly. Cardiovascular:      Rate and Rhythm: Normal rate and regular rhythm. Heart sounds: No murmur. Pulmonary:      Effort: Pulmonary effort is normal.      Breath sounds: Normal breath sounds. Lymphadenopathy:      Cervical: No cervical adenopathy. Skin:     General: Skin is warm and dry. Neurological:      Mental Status: He is alert and oriented to person, place, and time. ASSESSMENT AND PLAN:    Kaminijosé luis Bales was seen today for other. Diagnoses and all orders for this visit:    Second degree AV block, Mobitz type II    - continue current care with EP. Return for as needed, or for acute problem, or for his medicare pe. Sunny Sy, DO

## 2020-10-07 ENCOUNTER — HOSPITAL ENCOUNTER (OUTPATIENT)
Age: 75
Discharge: HOME | End: 2020-10-09
Payer: MEDICARE

## 2020-10-07 ENCOUNTER — TELEPHONE (OUTPATIENT)
Dept: NON INVASIVE DIAGNOSTICS | Age: 75
End: 2020-10-07

## 2020-10-07 ENCOUNTER — NURSE ONLY (OUTPATIENT)
Dept: NON INVASIVE DIAGNOSTICS | Age: 75
End: 2020-10-07

## 2020-10-07 VITALS — TEMPERATURE: 98 F

## 2020-10-07 LAB
ALBUMIN SERPL-MCNC: 4.3 G/DL (ref 3.5–5.2)
ALP BLD-CCNC: 78 U/L (ref 40–129)
ALT SERPL-CCNC: 13 U/L (ref 0–40)
ANION GAP SERPL CALCULATED.3IONS-SCNC: 16 MMOL/L (ref 7–16)
AST SERPL-CCNC: 23 U/L (ref 0–39)
BILIRUB SERPL-MCNC: 0.4 MG/DL (ref 0–1.2)
BUN BLDV-MCNC: 19 MG/DL (ref 8–23)
CALCIUM SERPL-MCNC: 9.5 MG/DL (ref 8.6–10.2)
CHLORIDE BLD-SCNC: 107 MMOL/L (ref 98–107)
CO2: 19 MMOL/L (ref 22–29)
CREAT SERPL-MCNC: 1 MG/DL (ref 0.7–1.2)
GFR AFRICAN AMERICAN: >60
GFR NON-AFRICAN AMERICAN: >60 ML/MIN/1.73
GLUCOSE BLD-MCNC: 90 MG/DL (ref 74–99)
HCT VFR BLD CALC: 47.8 % (ref 37–54)
HEMOGLOBIN: 15.7 G/DL (ref 12.5–16.5)
INR BLD: 1
MCH RBC QN AUTO: 30.3 PG (ref 26–35)
MCHC RBC AUTO-ENTMCNC: 32.8 % (ref 32–34.5)
MCV RBC AUTO: 92.1 FL (ref 80–99.9)
PDW BLD-RTO: 13.3 FL (ref 11.5–15)
PLATELET # BLD: 216 E9/L (ref 130–450)
PMV BLD AUTO: 8.9 FL (ref 7–12)
POTASSIUM SERPL-SCNC: 4.5 MMOL/L (ref 3.5–5)
PROTHROMBIN TIME: 11.2 SEC (ref 9.3–12.4)
RBC # BLD: 5.19 E12/L (ref 3.8–5.8)
SODIUM BLD-SCNC: 142 MMOL/L (ref 132–146)
T4 FREE: 1.01 NG/DL (ref 0.93–1.7)
TOTAL PROTEIN: 7.2 G/DL (ref 6.4–8.3)
TSH SERPL DL<=0.05 MIU/L-ACNC: 1.52 UIU/ML (ref 0.27–4.2)
WBC # BLD: 8.5 E9/L (ref 4.5–11.5)

## 2020-10-07 PROCEDURE — 84439 ASSAY OF FREE THYROXINE: CPT

## 2020-10-07 PROCEDURE — 80053 COMPREHEN METABOLIC PANEL: CPT

## 2020-10-07 PROCEDURE — 85027 COMPLETE CBC AUTOMATED: CPT

## 2020-10-07 PROCEDURE — 84443 ASSAY THYROID STIM HORMONE: CPT

## 2020-10-07 PROCEDURE — 85610 PROTHROMBIN TIME: CPT

## 2020-10-07 NOTE — PROGRESS NOTES
See [facility] Rosser report.     10.7.2020 Pacemaker interrogation and wound check    New onset AF found on device check. 19 hours of AF on 10. 6.2020      Reviewed with Dr. Jess Diggs. Plan:   Blood work today. Check with Dr. Vj Burgess if patient can stop either plavix or aspirin. Start Eliquis pending blood work.          [place] [phone]

## 2020-10-07 NOTE — PATIENT INSTRUCTIONS
Continue arm restrictions for 2 weeks       Call if any signs or symptoms of infection [phone] ext: [phone]  Fevers, chills, redness, swelling or drainage.

## 2020-10-07 NOTE — TELEPHONE ENCOUNTER
Patient presented to the office today for pacemaker wound check and interrogation. New onset AF noted on pacemaker check. 19 hours of AF on 10/6/2020   Currently AF on real time     Reviewed with Dr. Susan Clark. Plan per Dr. Naima Ferreira work today. Discuss with Dr. Alanna Christianson if patient can stop either the aspirin or plavix. Forwarding to Dr. Alanna Christianson.        [place] [id number]

## 2020-10-08 ENCOUNTER — TELEPHONE (OUTPATIENT)
Dept: NON INVASIVE DIAGNOSTICS | Age: 75
End: 2020-10-08

## 2020-10-08 NOTE — TELEPHONE ENCOUNTER
----- Message from Nesha Salgado MD sent at 10/8/2020 12:00 PM EDT -----  Please start Eliquis 5 mg bid.  Thanks.  ----- Message -----  From: Martínez Amaya  Sent: 10/8/2020  11:13 AM EDT  To: Nesha Salgado MD    Ok to start Eliquis?  ----- Message -----  Mandie Dinhr Incoming Results From [facility]  Sent: 10/7/2020   6:52 PM EDT  To: Ch[facility] [place] Support Staff

## 2020-10-08 NOTE — TELEPHONE ENCOUNTER
Spoke with patient's wife let her know patient can start Eliquis 5 mg BID. Samples will be given to patient to start and script sent to [facility]. Patient's wife verbalized understanding.

## 2020-10-14 ENCOUNTER — TELEPHONE (OUTPATIENT)
Dept: CARDIOLOGY CLINIC | Age: 75
End: 2020-10-14

## 2020-10-14 NOTE — TELEPHONE ENCOUNTER
On my visit, Oct 7 Dr Elmer Leal prescribed Eliquis. They checked with Dr Alanna Christianson about this med with others I was taken. I understood Eliquis was to be ordered from [facility]. I checked their website and no order was sent. Can you check on the order for me?   Eleni Comp.  [phone]

## 2020-11-05 ENCOUNTER — TELEPHONE (OUTPATIENT)
Dept: ADMINISTRATIVE | Age: 75
End: 2020-11-05

## 2020-11-05 NOTE — TELEPHONE ENCOUNTER
Pt calling he is due for an apt with Dr. Marda Cowden before 11/20/20 - he is going to [facility]. No available apt - please call him with an appropriate apt.

## 2020-11-14 NOTE — PROGRESS NOTES
Cardiac Electrophysiology Outpatient Progress Note    Gelacio Ervin  1945  Date of Service: 11/18/2020  PCP: Erika Lucero DO  Cardiologist: Lori Sutton DO  Electrophysiologist: Melissa Christianson MD     Subjective: Gelacio Ervin is seen in cardiac electrophysiology clinic for second degree AV block Mobitz II AVB status post dual chamber permanent pacemaker implantation on 9/23/20. He was found to have 19 hours of PAF on 10/6/20 and was started on Eliquis 5 mg bid on 10/8/20. The patient currently feels well but does report some minor SOBOE when climbing up stairs. He offers no complaints from a device POV. The device site looks well healed and free from infection or erosion. The patient denies any chest pain, palpitations, dizziness, syncope, orthopnea or paroxysmal nocturnal dyspnea. The patient continues to be followed remotely.     Patient Active Problem List   Diagnosis    Hyperlipidemia    Hand pain, right    Bronchitis, acute    Headache    Tinnitus    CAD (coronary artery disease)    Mobitz (type) I (Wenckebach's) atrioventricular block    Coronary artery disease due to lipid rich plaque    Chest pain    Second degree AV block, Mobitz type II    Pacemaker     Current Outpatient Medications   Medication Sig Dispense Refill    rosuvastatin (CRESTOR) 10 MG tablet TAKE 1 TABLET NIGHTLY 90 tablet 1    apixaban (ELIQUIS) 5 MG TABS tablet Take 1 tablet by mouth 2 times daily 180 tablet 3    amoxicillin (AMOXIL) 500 MG capsule TAKE 4 CAPSULES BY MOUTH 1 HOUR BEFORE APPOINTMENT      amLODIPine (NORVASC) 5 MG tablet Take 1 tablet by mouth daily 90 tablet 1    acetaminophen (TYLENOL) 500 MG tablet Take 500 mg by mouth as needed for Pain      clopidogrel (PLAVIX) 75 MG tablet Take 1 tablet by mouth daily 90 tablet 3    Coenzyme Q10-Levocarnitine (CO Q-10 PLUS PO) Take by mouth      Multiple Vitamins-Minerals (CENTRUM SILVER ADULT 50+) TABS Take 1 tablet by mouth daily      Omega-3 Fatty Acids (FISH OIL EXTRA STRENGTH PO) Take 1,000 mg by mouth daily       Specialty Vitamins Products (PROSTATE PO) Take by mouth daily       No current facility-administered medications for this visit. No Known Allergies    ROS:   Constitutional: Negative for fever, activity change and appetite change. HENT: Negative for epistaxis, difficulty swallowing. Eyes: Negative for blurred vision or double vision. Respiratory: Negative for cough, chest tightness, shortness of breath and wheezing. Cardiovascular: per HPI. Gastrointestinal: Negative for abdominal pain, heartburn, or blood in stool. Genitourinary: Negative for hematuria, burning or frequency. Musculoskeletal: Negative for myalgias, stiffness, or swelling. Skin: Negative for rash, pain, or lumps. Neurological: Negative for syncope, seizures, or headaches. Psychiatric/Behavioral: Negative for confusion and agitation. The patient is not nervous/anxious. PHYSICAL EXAM:  Vitals:    11/18/20 0810   BP: 128/80   Pulse: 88   Resp: 16   Weight: 219 lb (99.3 kg)   Height: 6' 1\" (1.854 m)     Constitutional: Oriented to person, place, and time. Well-developed and cooperative. Head: Normocephalic and atraumatic. Musculoskeletal: Normal range of motion of all extremities, no muscle weakness. Neurological: Alert and oriented to person, place, and time. Skin: Skin is warm and dry. No bruising, no ecchymosis and no rash noted. Extremity: No clubbing or cyanosis. No edema. Psychiatric: Normal mood and affect. Thought content normal.   Device site: well healed. No erosion, infection or migration. Pertinent Cardiac Testing:     Echocardiogram 9/23/20:  Findings      Left Ventricle   Normal left ventricular size and systolic function. Ejection fraction is visually estimated at 55-60%. Indeterminate diastolic function. No regional wall motion abnormalities seen. Normal left ventricular wall thickness.       Right Ventricle Normal right ventricular size and function. Left Atrium   The left atrium visually appears mildly dilated. Indexed volume appears under measured at 26 ml/m2. The interatrial septum appears intact. Right Atrium   Normal right atrial size. Mitral Valve   Structurally normal mitral valve. No evidence of mitral valve stenosis. Physiologic mitral regurgitation. Tricuspid Valve   The tricuspid valve appears structurally normal.   Physiologic and/or trace tricuspid regurgitation. Aortic Valve   Structurally normal aortic valve. The aortic valve is trileaflet. No hemodynamically significant aortic stenosis is present. No evidence of aortic valve regurgitation. Pulmonic Valve   The pulmonic valve was not well visualized. No evidence of pulmonic valve stenosis. No evidence of any pulmonic regurgitation. Pericardial Effusion   No evidence of pericardial effusion. Aorta   Aortic root dimension within normal limits. Miscellaneous   Normal Inferior Vena Cava diameter and respiratory variation. Conclusions      Summary   Normal left ventricular size and systolic function. Ejection fraction is visually estimated at 55-60%. Indeterminate diastolic function. No regional wall motion abnormalities seen. Normal left ventricular wall thickness. Normal right ventricular size and function. The left atrium visually appears mildly dilated. No significant valvular abnormalities.       Signature      ----------------------------------------------------------------   Electronically signed by Jeremie Griffith MD(Interpreting   physician) on 09/23/2020 01:22 PM   ----------------------------------------------------------------     M-Mode/2D Measurements & Calculations      LV Diastolic    LV Systolic Dimension: 3.7   AV Cusp Separation: 2.1 cmLA   Dimension: 5.5  cm                           Dimension: 3.9 cmAO Root   cm              LV Volume Diastolic: 243.0   Dimension: 2.8 cm   LV FS:32.7 %    ml   LV PW           LV Volume Systolic: 77.0 ml   Diastolic: 0.9  LV EDV/LV EDV Index: 146.5   cm              ml/68 ml/m^2LV ESV/LV ESV    RV Diastolic Dimension: 3.7   Septum          Index: 56.7 ml/26ml/ m^2     cm   Diastolic: 0.8  EF Calculated: 61.3 %   cm              LV Mass Index: 80 l/min*m^2  Ascending Aorta: 3 cm                                                LA volume/Index: 56.1 ml   LV Mass: 172.72                              /26.11ml/m^2   g               LVOT: 2 cm                   RA Area: 15.3 cm^2     Doppler Measurements & Calculations      MV Peak E-Wave: 0.57 AV Peak Velocity: 1.29 LVOT Peak Velocity: 1.07 m/s   m/s                  m/s                    LVOT Mean Velocity: 0.78 m/s   MV Peak A-Wave: 0.76 AV Peak Gradient: 6.65 LVOT Peak Gradient: 4.6   m/s                  mmHg                   mmHgLVOT Mean Gradient: 2.7   MV E/A Ratio: 0.74   AV Mean Velocity: 0.92 mmHg   MV Peak Gradient:    m/s   2.8 mmHg             AV Mean Gradient: 3.8   MV Mean Gradient:    mmHg   1.1 mmHg             AV VTI: 27 cm   MV Mean Velocity:    AV Area   0.48 m/s             (Continuity):2.66 cm^2 PV Peak Velocity: 1.18 m/s   MV Deceleration                             PV Peak Gradient: 5.53 mmHg   Time: 226.5 msec     LVOT VTI: 22.9 cm      PV Mean Velocity: 0.76 m/s   MV P1/2t: 71.6 msec  IVRT: 100.3 msec       PV Mean Gradient: 2.7 mmHg   MVA by PHT:3.07 cm^2   MV Area   (continuity): 3.2   cm^2   MV E' Septal   Velocity: 0.06 m/s   MV E' Lateral   Velocity: 7 m/s    [employer] MCOT--6/2020:  1. Sinus rhythm/sinus bradycardia. 2. Intermittent Mobitz I and Mobitz II AVB. 3. 5 beats of NSVT. 4. No AF or SVT. LHC(10/3/17): Left main:  Short.  No angiographically significant stenosis.   LAD:  Very tortous vessel, mid, eccentric, 80-85% diffuse stenosis right where the second diagonal takes off.  D1:  No angiographically significant stenosis. D2:  :Large vessel.  No angiographically significant stenosis. Circumflex:  No angiographically significant stenosis.  OM1:  Tiny vessel. OM2:  Tiny vessel. OM3:  Large vessel.  No angiographically significant stenosis. Right coronary artery:  Dominant vessel.  Proximal diffuse 30% stenosis. Left ventriculogram:  Normal LV size and systolic function.  No wall motion abnormalities.  Ejection fraction of 60-65%.     Device Interrogation:   Make/Model [employer] Mobile Accolade MRI  Mode DDDR 60/120  Battery Voltage/Longevity:  11 years    Pacing: A: 8%  RV: 97%    P wave: 6.8 mV  Impedance: 697 ohms   Threshold: 0.8 V @ 0.4 ms  RV R wave: 23.6 mV  Impedance: 681 ohms   Threshold: 0.8 V @ 0.4 ms  Episodes: AF burden: 15%  Reprogramming included: lowered outputs in A &V to [employer]  Overall device function is normal    All device programmable settings were evaluated per above and in the scanned document, along with iterative adjustments (capture thresholds) to assess and select the most appropriate final programming to provide for consistent delivery of the appropriate therapy and to verify function of the device. Impression:    1. Paroxysmal atrial fibrillation  - [id number] of 4 (age, HTN, CAD)  - Found to have 19 hours of PAF on 10/6/20.  - Started on Eliquis 5 mg bid on 10/8/20.   - Presents in NSR.  - AF burden 15%. - Re-education on importance of well controlled HTN (goal BP < 130/80), adequate weight control (goal BMI of < 27), physical activity consisting of moderate cardiopulmonary exercise up to a goal of 250 min/wk, daily compliance with CPAP in treating sleep apnea, smoking cessation and limited ETOH intake. 2. Pacemaker in situ   - DOI 09/23/2020  - BS; dual chamber  - Indication: second degree AV block Mobitz type II  - Normal device function. 3. Coronary artery disease  - Status post PCI to the LAD 10/3/17.  - On Plavix and Crestor    4. Hyperlipidemia  - On Crestor    5. Hypertension  - On Norvasc     6. Status post THR    Recommendations:    1. Patient to consider starting Toprol XL - he wishes to speak to Dr. Rose Mary Brannon first.   2. Continue Eliquis 5 mg bid. 3. Remote monitoring every 3 months. 4. Follow up in 6 months. Encouraged the patient to call the office for any questions or concerns. Thank you for allowing me to participate in your patient's care. I have spent a total of 40 minutes with the patient and the family reviewing the above stated recommendations. And a total of >50% of that time involved face-to-face time providing counseling and or coordination of care with the other providers.     Mariana Dougherty MD  Cardiac Electrophysiology  [facility]) Physicians  [facility] [place]: [facility]  8:20 AM  11/18/2020    CC: Dr. Rose Mary Brannon

## 2020-11-18 ENCOUNTER — OFFICE VISIT (OUTPATIENT)
Dept: NON INVASIVE DIAGNOSTICS | Age: 75
End: 2020-11-18
Payer: MEDICARE

## 2020-11-18 VITALS
DIASTOLIC BLOOD PRESSURE: 80 MMHG | WEIGHT: 219 LBS | HEART RATE: 88 BPM | BODY MASS INDEX: 29.03 KG/M2 | RESPIRATION RATE: 16 BRPM | HEIGHT: 73 IN | SYSTOLIC BLOOD PRESSURE: 128 MMHG

## 2020-11-18 PROCEDURE — 99024 POSTOP FOLLOW-UP VISIT: CPT | Performed by: INTERNAL MEDICINE

## 2020-11-18 PROCEDURE — 93280 PM DEVICE PROGR EVAL DUAL: CPT | Performed by: INTERNAL MEDICINE

## 2020-11-19 ENCOUNTER — TELEPHONE (OUTPATIENT)
Dept: CARDIOLOGY CLINIC | Age: 75
End: 2020-11-19

## 2020-11-19 RX ORDER — METOPROLOL SUCCINATE 25 MG/1
25 TABLET, EXTENDED RELEASE ORAL DAILY
Qty: 90 TABLET | Refills: 3 | Status: SHIPPED
Start: 2020-11-19 | End: 2021-10-01 | Stop reason: SDUPTHER

## 2020-11-19 NOTE — TELEPHONE ENCOUNTER
Digna Calvert, DO Latisha Briones               Note says wishes to discuss Toprol with me before starting.  I agree with JOYCE Lawson Guess should start Toprol 25 mg daily. yes

## 2020-11-19 NOTE — TELEPHONE ENCOUNTER
Patient notified of Dr. Gregorio Weber recommendation. Patient would like Toprol sent to [facility]. He does not wish to  a short course at a local pharmacy. Toprol e-scribed.

## 2020-12-23 ENCOUNTER — NURSE ONLY (OUTPATIENT)
Dept: NON INVASIVE DIAGNOSTICS | Age: 75
End: 2020-12-23
Payer: MEDICARE

## 2020-12-23 DIAGNOSIS — Z95.0 PACEMAKER: Primary | ICD-10-CM

## 2020-12-23 DIAGNOSIS — I44.1 SECOND DEGREE AV BLOCK, MOBITZ TYPE II: ICD-10-CM

## 2020-12-23 PROCEDURE — 93294 REM INTERROG EVL PM/LDLS PM: CPT | Performed by: INTERNAL MEDICINE

## 2020-12-23 PROCEDURE — 93296 REM INTERROG EVL PM/IDS: CPT | Performed by: INTERNAL MEDICINE

## 2021-01-15 NOTE — PROGRESS NOTES
See [employer] [place] report. Remote monitoring reviewed over a 90 day period.   End of 90 day monitoring period date of service 12/23/2020

## 2021-03-24 ENCOUNTER — NURSE ONLY (OUTPATIENT)
Dept: NON INVASIVE DIAGNOSTICS | Age: 76
End: 2021-03-24
Payer: MEDICARE

## 2021-03-24 DIAGNOSIS — I44.1 SECOND DEGREE AV BLOCK, MOBITZ TYPE II: ICD-10-CM

## 2021-03-24 DIAGNOSIS — I48.91 ATRIAL FIBRILLATION, UNSPECIFIED TYPE (HCC): ICD-10-CM

## 2021-03-24 DIAGNOSIS — Z95.0 PACEMAKER: Primary | ICD-10-CM

## 2021-04-11 PROCEDURE — 93294 REM INTERROG EVL PM/LDLS PM: CPT | Performed by: INTERNAL MEDICINE

## 2021-04-11 PROCEDURE — 93296 REM INTERROG EVL PM/IDS: CPT | Performed by: INTERNAL MEDICINE

## 2021-04-11 NOTE — PROGRESS NOTES
See [employer] [place] report. Remote monitoring reviewed over a 90 day period.   End of 90 day monitoring period date of service 03/24/2021

## 2021-05-13 ENCOUNTER — OFFICE VISIT (OUTPATIENT)
Dept: CARDIOLOGY CLINIC | Age: 76
End: 2021-05-13
Payer: MEDICARE

## 2021-05-13 VITALS
SYSTOLIC BLOOD PRESSURE: 132 MMHG | WEIGHT: 214.9 LBS | DIASTOLIC BLOOD PRESSURE: 80 MMHG | BODY MASS INDEX: 28.48 KG/M2 | RESPIRATION RATE: 18 BRPM | HEART RATE: 68 BPM | HEIGHT: 73 IN

## 2021-05-13 DIAGNOSIS — I48.0 PAF (PAROXYSMAL ATRIAL FIBRILLATION) (HCC): ICD-10-CM

## 2021-05-13 DIAGNOSIS — I25.10 CORONARY ARTERY DISEASE INVOLVING NATIVE CORONARY ARTERY OF NATIVE HEART WITHOUT ANGINA PECTORIS: Primary | ICD-10-CM

## 2021-05-13 PROCEDURE — 93000 ELECTROCARDIOGRAM COMPLETE: CPT | Performed by: INTERNAL MEDICINE

## 2021-05-13 PROCEDURE — 99214 OFFICE O/P EST MOD 30 MIN: CPT | Performed by: INTERNAL MEDICINE

## 2021-05-13 RX ORDER — ACETAMINOPHEN 160 MG
TABLET,DISINTEGRATING ORAL
COMMUNITY

## 2021-05-13 RX ORDER — AMLODIPINE BESYLATE 5 MG/1
7.5 TABLET ORAL DAILY
Qty: 135 TABLET | Refills: 3 | Status: SHIPPED
Start: 2021-05-13 | End: 2021-07-01

## 2021-05-13 NOTE — PROGRESS NOTES
Juventino Chavarria  1945  Date of Service: 2021    Patient Active Problem List    Diagnosis Date Noted    PAF (paroxysmal atrial fibrillation) ([facility] [address] 2021    Pacemaker 2020     Overview Note:     DOI 2020, [employer]       Second degree AV block, Mobitz type II     Chest pain     CAD (coronary artery disease) 10/04/2017    Mobitz (type) I (Wenckebach's) atrioventricular block 10/04/2017    Coronary artery disease due to lipid rich plaque      Overview Note:     10/3/17  3.0 x 18-mm Alpine, drug-eluting stent in the mid LAD followed by POTS          Hyperlipidemia     Headache     Tinnitus     Hand pain, right 2014    Bronchitis, acute 2013       Social History     Socioeconomic History    Marital status:      Spouse name: None    Number of children: None    Years of education: None    Highest education level: None   Occupational History    None   Social Needs    Financial resource strain: None    Food insecurity     Worry: None     Inability: None    Transportation needs     Medical: None     Non-medical: None   Tobacco Use    Smoking status: Former Smoker     Packs/day: 1.00     Years: 14.00     Pack years: 14.00     Types: Cigars     Quit date:      Years since quittin.3    Smokeless tobacco: Never Used    Tobacco comment: occasional   Substance and Sexual Activity    Alcohol use: Yes     Comment: occasionally    Drug use: No    Sexual activity: Yes     Partners: Female   Lifestyle    Physical activity     Days per week: None     Minutes per session: None    Stress: None   Relationships    Social connections     Talks on phone: None     Gets together: None     Attends Judaism service: None     Active member of club or organization: None     Attends meetings of clubs or organizations: None     Relationship status: None    Intimate partner violence     Fear of current or ex partner: None     Emotionally abused: None Physically abused: None     Forced sexual activity: None   Other Topics Concern    None   Social History Narrative    None       Current Outpatient Medications   Medication Sig Dispense Refill    Cholecalciferol (VITAMIN D3) 50 MCG (2000 UT) CAPS Take by mouth      amLODIPine (NORVASC) 5 MG tablet TAKE 1 TABLET DAILY 90 tablet 0    rosuvastatin (CRESTOR) 10 MG tablet TAKE 1 TABLET NIGHTLY 90 tablet 0    metoprolol succinate (TOPROL XL) 25 MG extended release tablet Take 1 tablet by mouth daily 90 tablet 3    apixaban (ELIQUIS) 5 MG TABS tablet Take 1 tablet by mouth 2 times daily 180 tablet 3    acetaminophen (TYLENOL) 500 MG tablet Take 500 mg by mouth as needed for Pain      clopidogrel (PLAVIX) 75 MG tablet Take 1 tablet by mouth daily 90 tablet 3    Coenzyme Q10-Levocarnitine (CO Q-10 PLUS PO) Take by mouth      Multiple Vitamins-Minerals (CENTRUM SILVER ADULT 50+) TABS Take 1 tablet by mouth daily      Omega-3 Fatty Acids (FISH OIL EXTRA STRENGTH PO) Take 1,000 mg by mouth daily       Specialty Vitamins Products (PROSTATE PO) Take by mouth daily      amoxicillin (AMOXIL) 500 MG capsule TAKE 4 CAPSULES BY MOUTH 1 HOUR BEFORE APPOINTMENT       No current facility-administered medications for this visit. No Known Allergies    Chief Complaint:  Raymon Garcia is here today for follow up and management/recomendations for coronary artery disease. History of Present Illness: Raymon Garcia states that He does house work, goes up the stairs, & goes shopping. He also exercises at the [employer]. He denies any chest discomfort or dyspnea on exertion while performing any of these activities. He denies any orthopnea/PND, or lower extremity edema. He denies any palpitations or presyncopal symptoms. REVIEW OF SYSTEMS:  As above. Patient does not complain of any fever, chills, nausea, vomiting or diarrhea. No focal, motor or neurological deficits.  No changes in his/her vision, hearing, bowel or and rhythm recommendations to EP. I did discuss with him that if the Eliquis needs to be held for the surgery that he should take an enteric-coated aspirin 81 mg daily on those days for his coronary artery disease. He states that he understands. Thank you for allowing me to participate in your patient's care.       [address], [address] [place]  Interventional Cardiology

## 2021-05-17 ENCOUNTER — OFFICE VISIT (OUTPATIENT)
Dept: PRIMARY CARE CLINIC | Age: 76
End: 2021-05-17
Payer: MEDICARE

## 2021-05-17 VITALS
BODY MASS INDEX: 28.63 KG/M2 | HEART RATE: 74 BPM | WEIGHT: 216 LBS | SYSTOLIC BLOOD PRESSURE: 124 MMHG | RESPIRATION RATE: 16 BRPM | TEMPERATURE: 96.9 F | OXYGEN SATURATION: 97 % | HEIGHT: 73 IN | DIASTOLIC BLOOD PRESSURE: 70 MMHG

## 2021-05-17 DIAGNOSIS — Z12.5 SCREENING PSA (PROSTATE SPECIFIC ANTIGEN): ICD-10-CM

## 2021-05-17 DIAGNOSIS — E78.49 OTHER HYPERLIPIDEMIA: Primary | ICD-10-CM

## 2021-05-17 PROCEDURE — 99213 OFFICE O/P EST LOW 20 MIN: CPT | Performed by: FAMILY MEDICINE

## 2021-05-17 RX ORDER — CLOPIDOGREL BISULFATE 75 MG/1
75 TABLET ORAL DAILY
Qty: 90 TABLET | Refills: 3 | Status: SHIPPED
Start: 2021-05-17 | End: 2022-06-06

## 2021-05-17 SDOH — ECONOMIC STABILITY: FOOD INSECURITY: WITHIN THE PAST 12 MONTHS, THE FOOD YOU BOUGHT JUST DIDN'T LAST AND YOU DIDN'T HAVE MONEY TO GET MORE.: NEVER TRUE

## 2021-05-17 ASSESSMENT — ENCOUNTER SYMPTOMS
TROUBLE SWALLOWING: 0
COUGH: 0
CHOKING: 0
BLOOD IN STOOL: 0
ABDOMINAL PAIN: 0
SORE THROAT: 0
SHORTNESS OF BREATH: 0

## 2021-05-17 ASSESSMENT — PATIENT HEALTH QUESTIONNAIRE - PHQ9
2. FEELING DOWN, DEPRESSED OR HOPELESS: 0
SUM OF ALL RESPONSES TO PHQ QUESTIONS 1-9: 0
SUM OF ALL RESPONSES TO PHQ QUESTIONS 1-9: 0
1. LITTLE INTEREST OR PLEASURE IN DOING THINGS: 0

## 2021-05-17 NOTE — PROGRESS NOTES
less than 2 seconds. Neurological:      General: No focal deficit present. Mental Status: He is alert and oriented to person, place, and time. Cranial Nerves: No cranial nerve deficit. Deep Tendon Reflexes: Reflexes normal.   Psychiatric:         Mood and Affect: Mood normal.         Behavior: Behavior normal.         ASSESSMENT AND PLAN:    Marianne Tidwell was seen today for pre-op exam.    Diagnoses and all orders for this visit:    Other hyperlipidemia  -     Lipid Panel; Future  -     Comprehensive Metabolic Panel; Future    Screening PSA (prostate specific antigen)  -     PSA screening; Future    - continue current med for chol  - low risk for surgery     Return in about 6 months (around 11/17/2021) for medicare pe, or for acute problem. I reviewed the students documentation ( Hx, exam, MDM ), examined the patient and performed the A&P.     Cephus Holter Economus, DO

## 2021-05-18 NOTE — PROGRESS NOTES
Cardiac Electrophysiology Outpatient Progress Note    Rose Seaman  1945  Date of Service: 5/19/2021  PCP: Chas Mendez DO  Cardiologist: Osmar Rayo DO  Electrophysiologist: Kat Black MD     Subjective: Rose Seaman is seen in cardiac electrophysiology clinic for second degree AV block Mobitz II AVB status post dual chamber permanent pacemaker implantation on 9/23/20. He was found to have 19 hours of PAF on 10/6/20 and was started on Eliquis 5 mg bid on 10/8/20. Since last office visit, the patient states he feels well and offers no complaints from a device POV. The device site looks well healed and free from infection or erosion. The patient denies any chest pain, dyspnea, palpitations, dizziness, syncope, orthopnea or paroxysmal nocturnal dyspnea. The patient continues to be followed remotely.     Patient Active Problem List   Diagnosis    Hyperlipidemia    Hand pain, right    Bronchitis, acute    Headache    Tinnitus    CAD (coronary artery disease)    Mobitz (type) I (Wenckebach's) atrioventricular block    Coronary artery disease due to lipid rich plaque    Chest pain    Second degree AV block, Mobitz type II    Pacemaker    PAF (paroxysmal atrial fibrillation) (HCC)     Current Outpatient Medications   Medication Sig Dispense Refill    clopidogrel (PLAVIX) 75 MG tablet Take 1 tablet by mouth daily 90 tablet 3    Cholecalciferol (VITAMIN D3) 50 MCG (2000 UT) CAPS Take by mouth      amLODIPine (NORVASC) 5 MG tablet Take 1.5 tablets by mouth daily 135 tablet 3    rosuvastatin (CRESTOR) 10 MG tablet TAKE 1 TABLET NIGHTLY 90 tablet 0    metoprolol succinate (TOPROL XL) 25 MG extended release tablet Take 1 tablet by mouth daily 90 tablet 3    apixaban (ELIQUIS) 5 MG TABS tablet Take 1 tablet by mouth 2 times daily 180 tablet 3    acetaminophen (TYLENOL) 500 MG tablet Take 500 mg by mouth as needed for Pain      Coenzyme Q10-Levocarnitine (CO Q-10 PLUS PO) Take by mouth      Multiple Vitamins-Minerals (CENTRUM SILVER ADULT 50+) TABS Take 1 tablet by mouth daily      Omega-3 Fatty Acids (FISH OIL EXTRA STRENGTH PO) Take 1,000 mg by mouth daily       Specialty Vitamins Products (PROSTATE PO) Take by mouth daily       No current facility-administered medications for this visit. No Known Allergies    ROS:   Constitutional: Negative for fever, activity change and appetite change. HENT: Negative for epistaxis, difficulty swallowing. Eyes: Negative for blurred vision or double vision. Respiratory: Negative for cough, chest tightness, shortness of breath and wheezing. Cardiovascular: per HPI. Gastrointestinal: Negative for abdominal pain, heartburn, or blood in stool. Genitourinary: Negative for hematuria, burning or frequency. Musculoskeletal: Negative for myalgias, stiffness, or swelling. Skin: Negative for rash, pain, or lumps. Neurological: Negative for syncope, seizures, or headaches. Psychiatric/Behavioral: Negative for confusion and agitation. The patient is not nervous/anxious. PHYSICAL EXAM:  Vitals:    05/19/21 0823   BP: 124/78   Pulse: 71   Resp: 18   Weight: 215 lb (97.5 kg)   Height: 6' 1\" (1.854 m)     Constitutional: Oriented to person, place, and time. Well-developed and cooperative. Head: Normocephalic and atraumatic. Musculoskeletal: Normal range of motion of all extremities, no muscle weakness. Neurological: Alert and oriented to person, place, and time. Skin: Skin is warm and dry. No bruising, no ecchymosis and no rash noted. Extremity: No clubbing or cyanosis. No edema. Psychiatric: Normal mood and affect. Thought content normal.   Device site: well healed. No erosion, infection or migration. Pertinent Cardiac Testing:     Echocardiogram 9/23/20:  Findings      Left Ventricle   Normal left ventricular size and systolic function. Ejection fraction is visually estimated at 55-60%.    Indeterminate diastolic Dimension: 5.5  cm                           Dimension: 3.9 cmAO Root   cm              LV Volume Diastolic: 412.0   Dimension: 2.8 cm   LV FS:32.7 %    ml   LV PW           LV Volume Systolic: 37.9 ml   Diastolic: 0.9  LV EDV/LV EDV Index: 146.5   cm              ml/68 ml/m^2LV ESV/LV ESV    RV Diastolic Dimension: 3.7   Septum          Index: 56.7 ml/26ml/ m^2     cm   Diastolic: 0.8  EF Calculated: 61.3 %   cm              LV Mass Index: 80 l/min*m^2  Ascending Aorta: 3 cm                                                LA volume/Index: 56.1 ml   LV Mass: 172.72                              /26.11ml/m^2   g               LVOT: 2 cm                   RA Area: 15.3 cm^2     Doppler Measurements & Calculations      MV Peak E-Wave: 0.57 AV Peak Velocity: 1.29 LVOT Peak Velocity: 1.07 m/s   m/s                  m/s                    LVOT Mean Velocity: 0.78 m/s   MV Peak A-Wave: 0.76 AV Peak Gradient: 6.65 LVOT Peak Gradient: 4.6   m/s                  mmHg                   mmHgLVOT Mean Gradient: 2.7   MV E/A Ratio: 0.74   AV Mean Velocity: 0.92 mmHg   MV Peak Gradient:    m/s   2.8 mmHg             AV Mean Gradient: 3.8   MV Mean Gradient:    mmHg   1.1 mmHg             AV VTI: 27 cm   MV Mean Velocity:    AV Area   0.48 m/s             (Continuity):2.66 cm^2 PV Peak Velocity: 1.18 m/s   MV Deceleration                             PV Peak Gradient: 5.53 mmHg   Time: 226.5 msec     LVOT VTI: 22.9 cm      PV Mean Velocity: 0.76 m/s   MV P1/2t: 71.6 msec  IVRT: 100.3 msec       PV Mean Gradient: 2.7 mmHg   MVA by PHT:3.07 cm^2   MV Area   (continuity): 3.2   cm^2   MV E' Septal   Velocity: 0.06 m/s   MV E' Lateral   Velocity: 7 m/s    [employer] Patch MCOT--6/2020:  1. Sinus rhythm/sinus bradycardia. 2. Intermittent Mobitz I and Mobitz II AVB. 3. 5 beats of NSVT. 4. No AF or SVT. LHC(10/3/17): Left main:  Short.  No angiographically significant stenosis.   LAD:  Very tortous vessel, mid, eccentric, 80-85% diffuse stenosis right where the second diagonal takes off.  D1:  No angiographically significant stenosis. D2:  :Large vessel.  No angiographically significant stenosis. Circumflex:  No angiographically significant stenosis.  OM1:  Tiny vessel. OM2:  Tiny vessel. OM3:  Large vessel.  No angiographically significant stenosis. Right coronary artery:  Dominant vessel.  Proximal diffuse 30% stenosis. Left ventriculogram:  Normal LV size and systolic function.  No wall motion abnormalities.  Ejection fraction of 60-65%.     Device Interrogation: 5/19/21   Make/Model [employer] Accolade MRI  Mode DDDR 60/130  Battery Voltage/Longevity: 12.5 years    Pacing: A: 25%  RV: 99%    P wave: 10.2 mV  Impedance: 743 ohms   Threshold: 0.6 V @ 0.4 ms  RV R wave: 22.5 mV  Impedance: 751 ohms   Threshold: 0.6 V @ 0.4 ms  Episodes: AF burden: <1% - 8 episodes <1 minute   Reprogramming included: see below  Overall device function is normal    All device programmable settings were evaluated per above and in the scanned document, along with iterative adjustments (capture thresholds) to assess and select the most appropriate final programming to provide for consistent delivery of the appropriate therapy and to verify function of the device. Impression:    1. Paroxysmal atrial fibrillation  - [id number] of 4 (age, HTN, CAD)  - Found to have 19 hours of PAF on 10/6/20.  - Started on Eliquis 5 mg bid on 10/8/20.   - On Toprol XL for rate control.  - Presents in NSR.  - AF burden <1%. - Re-education on importance of well controlled HTN (goal BP < 130/80), adequate weight control (goal BMI of < 27), physical activity consisting of moderate cardiopulmonary exercise up to a goal of 250 min/wk, daily compliance with CPAP in treating sleep apnea, smoking cessation and limited ETOH intake.      2. Pacemaker in situ   - DOI 09/23/2020  - BSCI; dual chamber  - Indication: second degree AV block Mobitz type II  - Normal device function. 3. Coronary artery disease  - Status post PCI to the LAD 10/3/17.  - On Plavix, Toprol XL and Crestor    4. Hyperlipidemia  - On Crestor    5. Hypertension  - Well controlled. - On Norvasc and Toprol XL     6. Status post THR    Recommendations:    1. Normal pacemaker function. Pacemaker will need to be reprogrammed during upcoming surgery. 2. Continue Toprol XL 25 mg daily. 3. Continue Eliquis 5 mg bid. Rica Lew from a EP POV to hold Eliquis for at least 48 hours prior to upcoming surgery. 4. Remote monitoring every 3 months. 5. Follow up in 6 months or sooner PRN. Encouraged the patient to call the office for any questions or concerns. Thank you for allowing me to participate in your patient's care. I have spent a total of 40 minutes with the patient and the family reviewing the above stated recommendations. And a total of >50% of that time involved face-to-face time providing counseling and/or coordination of care with the other providers, preparation for the clinic visit, reviewing records/tests, counseling/education of the patient, ordering medications/tests/procedures, coordinating care, and documenting clinical information in the EHR.      Silvia Freeman MD  Cardiac Electrophysiology  [address]  [facility]  8:31 AM  5/19/2021

## 2021-05-19 ENCOUNTER — OFFICE VISIT (OUTPATIENT)
Dept: NON INVASIVE DIAGNOSTICS | Age: 76
End: 2021-05-19
Payer: MEDICARE

## 2021-05-19 VITALS
RESPIRATION RATE: 18 BRPM | HEART RATE: 71 BPM | WEIGHT: 215 LBS | HEIGHT: 73 IN | SYSTOLIC BLOOD PRESSURE: 124 MMHG | BODY MASS INDEX: 28.49 KG/M2 | DIASTOLIC BLOOD PRESSURE: 78 MMHG

## 2021-05-19 DIAGNOSIS — Z95.0 PACEMAKER: ICD-10-CM

## 2021-05-19 PROCEDURE — 99215 OFFICE O/P EST HI 40 MIN: CPT | Performed by: INTERNAL MEDICINE

## 2021-05-19 PROCEDURE — 93280 PM DEVICE PROGR EVAL DUAL: CPT | Performed by: INTERNAL MEDICINE

## 2021-05-20 ENCOUNTER — NURSE ONLY (OUTPATIENT)
Dept: PRIMARY CARE CLINIC | Age: 76
End: 2021-05-20
Payer: MEDICARE

## 2021-05-20 DIAGNOSIS — E78.2 MIXED HYPERLIPIDEMIA: ICD-10-CM

## 2021-05-20 DIAGNOSIS — Z12.5 SCREENING PSA (PROSTATE SPECIFIC ANTIGEN): ICD-10-CM

## 2021-05-20 DIAGNOSIS — E78.49 OTHER HYPERLIPIDEMIA: ICD-10-CM

## 2021-05-20 LAB
ALBUMIN SERPL-MCNC: 4.3 G/DL (ref 3.5–5.2)
ALP BLD-CCNC: 63 U/L (ref 40–129)
ALT SERPL-CCNC: 15 U/L (ref 0–40)
ANION GAP SERPL CALCULATED.3IONS-SCNC: 10 MMOL/L (ref 7–16)
AST SERPL-CCNC: 26 U/L (ref 0–39)
BILIRUB SERPL-MCNC: 0.6 MG/DL (ref 0–1.2)
BUN BLDV-MCNC: 21 MG/DL (ref 6–23)
CALCIUM SERPL-MCNC: 9.6 MG/DL (ref 8.6–10.2)
CHLORIDE BLD-SCNC: 109 MMOL/L (ref 98–107)
CHOLESTEROL, TOTAL: 121 MG/DL (ref 0–199)
CO2: 23 MMOL/L (ref 22–29)
CREAT SERPL-MCNC: 1 MG/DL (ref 0.7–1.2)
GFR AFRICAN AMERICAN: >60
GFR NON-AFRICAN AMERICAN: >60 ML/MIN/1.73
GLUCOSE BLD-MCNC: 102 MG/DL (ref 74–99)
HDLC SERPL-MCNC: 39 MG/DL
LDL CHOLESTEROL CALCULATED: 69 MG/DL (ref 0–99)
POTASSIUM SERPL-SCNC: 4.8 MMOL/L (ref 3.5–5)
PROSTATE SPECIFIC ANTIGEN: 3.48 NG/ML (ref 0–4)
SODIUM BLD-SCNC: 142 MMOL/L (ref 132–146)
TOTAL PROTEIN: 6.8 G/DL (ref 6.4–8.3)
TRIGL SERPL-MCNC: 66 MG/DL (ref 0–149)
VLDLC SERPL CALC-MCNC: 13 MG/DL

## 2021-05-20 PROCEDURE — 36415 COLL VENOUS BLD VENIPUNCTURE: CPT | Performed by: FAMILY MEDICINE

## 2021-05-24 LAB
BASOPHILS ABSOLUTE: NORMAL
BASOPHILS RELATIVE PERCENT: NORMAL
EOSINOPHILS ABSOLUTE: NORMAL
EOSINOPHILS RELATIVE PERCENT: NORMAL
HCT VFR BLD CALC: 41.6 % (ref 41–53)
HEMOGLOBIN: 14.6 G/DL (ref 13.5–17.5)
LYMPHOCYTES ABSOLUTE: NORMAL
LYMPHOCYTES RELATIVE PERCENT: NORMAL
MCH RBC QN AUTO: 31.8 PG
MCHC RBC AUTO-ENTMCNC: 35 G/DL
MCV RBC AUTO: 90.7 FL
MONOCYTES ABSOLUTE: NORMAL
MONOCYTES RELATIVE PERCENT: NORMAL
NEUTROPHILS ABSOLUTE: NORMAL
NEUTROPHILS RELATIVE PERCENT: NORMAL
PLATELET # BLD: 200 K/ΜL
PMV BLD AUTO: 6.8 FL
RBC # BLD: 4.59 10^6/ΜL
WBC # BLD: 6.6 10^3/ML

## 2021-05-25 ENCOUNTER — TELEPHONE (OUTPATIENT)
Dept: CARDIOLOGY CLINIC | Age: 76
End: 2021-05-25

## 2021-05-25 NOTE — TELEPHONE ENCOUNTER
Change the Plavix to enteric-coated aspirin 81 mg daily now. Do not hold any aspirin for the procedure. It is okay with cardiology to hold the Eliquis for 2 days prior to the procedure. Resume after the procedure when okay with surgery. After the procedure change the aspirin back to Plavix when okay with surgery.

## 2021-05-25 NOTE — TELEPHONE ENCOUNTER
Patient would like to know how long to hold his anticoagulation medication for his procedure on June 2nd with Dr Елена Byrd. Please advise.

## 2021-05-25 NOTE — TELEPHONE ENCOUNTER
I did address this when I saw him in the office. Please see my office note. Really EP should address the Eliquis if they have not already. However, if not then follow-up I said.

## 2021-06-29 ENCOUNTER — TELEPHONE (OUTPATIENT)
Dept: ADMINISTRATIVE | Age: 76
End: 2021-06-29

## 2021-07-01 ENCOUNTER — OFFICE VISIT (OUTPATIENT)
Dept: CARDIOLOGY CLINIC | Age: 76
End: 2021-07-01
Payer: MEDICARE

## 2021-07-01 VITALS
DIASTOLIC BLOOD PRESSURE: 68 MMHG | RESPIRATION RATE: 18 BRPM | HEIGHT: 73 IN | HEART RATE: 71 BPM | SYSTOLIC BLOOD PRESSURE: 122 MMHG | BODY MASS INDEX: 28.41 KG/M2 | WEIGHT: 214.4 LBS

## 2021-07-01 DIAGNOSIS — I48.0 PAF (PAROXYSMAL ATRIAL FIBRILLATION) (HCC): ICD-10-CM

## 2021-07-01 DIAGNOSIS — R06.09 DOE (DYSPNEA ON EXERTION): ICD-10-CM

## 2021-07-01 DIAGNOSIS — R55 NEAR SYNCOPE: ICD-10-CM

## 2021-07-01 DIAGNOSIS — I25.10 CORONARY ARTERY DISEASE INVOLVING NATIVE CORONARY ARTERY OF NATIVE HEART WITHOUT ANGINA PECTORIS: Primary | ICD-10-CM

## 2021-07-01 PROCEDURE — 93000 ELECTROCARDIOGRAM COMPLETE: CPT | Performed by: INTERNAL MEDICINE

## 2021-07-01 PROCEDURE — 99214 OFFICE O/P EST MOD 30 MIN: CPT | Performed by: INTERNAL MEDICINE

## 2021-07-01 RX ORDER — AMLODIPINE BESYLATE 5 MG/1
5 TABLET ORAL DAILY
Qty: 90 TABLET | Refills: 3 | Status: SHIPPED
Start: 2021-07-01 | End: 2022-08-02 | Stop reason: SDUPTHER

## 2021-07-01 NOTE — PROGRESS NOTES
Elizabeth Cough  1945  Date of Service: 2021    Patient Active Problem List    Diagnosis Date Noted    PAF (paroxysmal atrial fibrillation) ([facility] [address] 2021    Pacemaker 2020     Overview Note:     DOI 2020, gate5       Second degree AV block, Mobitz type II     Chest pain     CAD (coronary artery disease) 10/04/2017    Mobitz (type) I (Wenckebach's) atrioventricular block 10/04/2017    Coronary artery disease due to lipid rich plaque      Overview Note:     10/3/17  3.0 x 18-mm Alpine, drug-eluting stent in the mid LAD followed by POTS          Hyperlipidemia     Headache     Tinnitus     Hand pain, right 2014    Bronchitis, acute 2013       Social History     Socioeconomic History    Marital status:      Spouse name: Not on file    Number of children: Not on file    Years of education: Not on file    Highest education level: Not on file   Occupational History    Not on file   Tobacco Use    Smoking status: Former Smoker     Packs/day: 1.00     Years: 14.00     Pack years: 14.00     Types: Cigars     Quit date:      Years since quittin.5    Smokeless tobacco: Never Used    Tobacco comment: occasional   Vaping Use    Vaping Use: Never used   Substance and Sexual Activity    Alcohol use: Yes     Comment: occasionally    Drug use: No    Sexual activity: Yes     Partners: Female   Other Topics Concern    Not on file   Social History Narrative    Not on file     Social Determinants of Health     Financial Resource Strain: Low Risk     Difficulty of Paying Living Expenses: Not hard at all   Food Insecurity: No Food Insecurity    Worried About Running Out of Food in the Last Year: Never true    Dolores of Food in the Last Year: Never true   Transportation Needs: No Transportation Needs    Lack of Transportation (Medical): No    Lack of Transportation (Non-Medical):  No   Physical Activity:     Days of Exercise per Week:     Minutes of Exercise per Session:    Stress:     Feeling of Stress :    Social Connections:     Frequency of Communication with Friends and Family:     Frequency of Social Gatherings with Friends and Family:     Attends [employer] Services:     Active Member of Clubs or Organizations:     Attends Club or Organization Meetings:     Marital Status:    Intimate Partner Violence:     Fear of Current or Ex-Partner:     Emotionally Abused:     Physically Abused:     Sexually Abused:        Current Outpatient Medications   Medication Sig Dispense Refill    clopidogrel (PLAVIX) 75 MG tablet Take 1 tablet by mouth daily 90 tablet 3    Cholecalciferol (VITAMIN D3) 50 MCG (2000 UT) CAPS Take by mouth      amLODIPine (NORVASC) 5 MG tablet Take 1.5 tablets by mouth daily 135 tablet 3    rosuvastatin (CRESTOR) 10 MG tablet TAKE 1 TABLET NIGHTLY 90 tablet 0    metoprolol succinate (TOPROL XL) 25 MG extended release tablet Take 1 tablet by mouth daily 90 tablet 3    apixaban (ELIQUIS) 5 MG TABS tablet Take 1 tablet by mouth 2 times daily 180 tablet 3    acetaminophen (TYLENOL) 500 MG tablet Take 500 mg by mouth as needed for Pain      Multiple Vitamins-Minerals (CENTRUM SILVER ADULT 50+) TABS Take 1 tablet by mouth daily      Omega-3 Fatty Acids (FISH OIL EXTRA STRENGTH PO) Take 1,000 mg by mouth daily       Specialty Vitamins Products (PROSTATE PO) Take by mouth daily      Coenzyme Q10-Levocarnitine (CO Q-10 PLUS PO) Take by mouth (Patient not taking: Reported on 7/1/2021)       No current facility-administered medications for this visit. No Known Allergies    Chief Complaint:  Celeste Frankel is here today for follow up and management/recomendations for coronary artery disease. History of Present Illness: Celeste Frankel states that He had hip surgery several weeks ago. He states that now he has been having increasing dyspnea for the past few weeks.   He states thinking in retrospect he may have been having some dyspnea prior to the hip surgery as well. His Norvasc was also increased a few weeks ago and he states that he now has lightheadedness fairly frequently when standing up. He denies any chest discomfort. He denies any orthopnea/PND, or lower extremity edema. He denies any palpitations or presyncopal symptoms. REVIEW OF SYSTEMS:  As above. Patient does not complain of any fever, chills, nausea, vomiting or diarrhea. No focal, motor or neurological deficits. No changes in his/her vision, hearing, bowel or bladder habits. He is not known to have a history of thyroid problems. No recent nose bleeds. PHYSICAL EXAM:  Vitals:    07/01/21 1352   BP: 122/68   Pulse: 71   Resp: 18   Weight: 214 lb 6.4 oz (97.3 kg)   Height: 6' 1\" (1.854 m)       GENERAL:  He is alert and oriented x 3, communicates well, in no distress. NECK:  No masses, trachea is mid position. Supple, full ROM, no JVD or bruits. No palpable thyromegaly or lymphadenopathy. HEART:  Regular rate and rhythm. Normal S1 and S2. No abnormal murmurs. LUNGS:  Clear to auscultation bilaterally. No use of accessory muscles. symmetrical excursion. ABDOMEN:  Soft, non-tender. Normal bowel sounds. EXTREMITIES:  Full ROM x 4. No bilateral lower edema. Good distal pulses. EYES:  Extraocular muscles intact. PERRL. Normal lids & conjunctiva. ENT:  Nares are clear & not bleeding. Moist mucosa. Normal lips formation. No external masses   NEURO: no tremors, full ROM x 4, EOMI. SKIN:  Warm, dry and intact. Normal turgor. EKG: A sensed -V paced, 71 bpm.      Assessment:   1. Coronary artery disease. 2. Increasing dyspnea the past few weeks after his hip surgery. No hypervolemia on today's exam.  3. Orthostatic symptoms since his Norvasc was increased. 4. He follows with electrophysiology for paroxysmal atrial fibrillation, second-degree Mobitz 2 heart block, and pacemaker. 5. Hypertension.   Not well controlled

## 2021-07-02 ENCOUNTER — TELEPHONE (OUTPATIENT)
Dept: CARDIOLOGY CLINIC | Age: 76
End: 2021-07-02

## 2021-07-02 NOTE — TELEPHONE ENCOUNTER
Called patient to check status of D-dimer and CBC. Patient did not go today, he will go on Tuesday. We will await these results.

## 2021-07-06 ENCOUNTER — HOSPITAL ENCOUNTER (OUTPATIENT)
Dept: CT IMAGING | Age: 76
Discharge: HOME | End: 2021-07-08
Payer: MEDICARE

## 2021-07-06 ENCOUNTER — TELEPHONE (OUTPATIENT)
Dept: CARDIOLOGY CLINIC | Age: 76
End: 2021-07-06

## 2021-07-06 ENCOUNTER — HOSPITAL ENCOUNTER (OUTPATIENT)
Age: 76
Discharge: HOME | End: 2021-07-06
Payer: MEDICARE

## 2021-07-06 DIAGNOSIS — R79.89 ELEVATED D-DIMER: Primary | ICD-10-CM

## 2021-07-06 DIAGNOSIS — R79.89 ELEVATED D-DIMER: ICD-10-CM

## 2021-07-06 DIAGNOSIS — R06.02 SOB (SHORTNESS OF BREATH): ICD-10-CM

## 2021-07-06 DIAGNOSIS — R06.09 DOE (DYSPNEA ON EXERTION): ICD-10-CM

## 2021-07-06 DIAGNOSIS — R55 NEAR SYNCOPE: ICD-10-CM

## 2021-07-06 LAB
D DIMER: 750 NG/ML DDU
HCT VFR BLD CALC: 38.9 % (ref 37–54)
HEMOGLOBIN: 13.3 G/DL (ref 12.5–16.5)
MCH RBC QN AUTO: 31.5 PG (ref 26–35)
MCHC RBC AUTO-ENTMCNC: 34.2 % (ref 32–34.5)
MCV RBC AUTO: 92.2 FL (ref 80–99.9)
PDW BLD-RTO: 13.4 FL (ref 11.5–15)
PLATELET # BLD: 168 E9/L (ref 130–450)
PMV BLD AUTO: 8.4 FL (ref 7–12)
RBC # BLD: 4.22 E12/L (ref 3.8–5.8)
WBC # BLD: 8.3 E9/L (ref 4.5–11.5)

## 2021-07-06 PROCEDURE — 6360000004 HC RX CONTRAST MEDICATION: Performed by: STUDENT IN AN ORGANIZED HEALTH CARE EDUCATION/TRAINING PROGRAM

## 2021-07-06 PROCEDURE — 85027 COMPLETE CBC AUTOMATED: CPT

## 2021-07-06 PROCEDURE — 85378 FIBRIN DEGRADE SEMIQUANT: CPT

## 2021-07-06 PROCEDURE — 71275 CT ANGIOGRAPHY CHEST: CPT

## 2021-07-06 PROCEDURE — 36415 COLL VENOUS BLD VENIPUNCTURE: CPT

## 2021-07-06 RX ADMIN — IOPAMIDOL 75 ML: 755 INJECTION, SOLUTION INTRAVENOUS at 14:03

## 2021-07-06 NOTE — TELEPHONE ENCOUNTER
Spoke with Terell in [employer]. CTA chest scheduled for this afternoon. Patient has not had anything to eat/drink since breakfast, wife was advised to have patient proceed to [facility] now and they will fit him him.

## 2021-07-06 NOTE — TELEPHONE ENCOUNTER
D-dimer today is (750). Per verbal from Dr. Frederic Abdullahi, patient needs STAT CTA chest (73784) re: elevated D-dimer (R79.89) and SOB (R06.02). Patient's wife notified. Patient prefers [facility]. Prior auth pending.

## 2021-07-08 ENCOUNTER — TELEPHONE (OUTPATIENT)
Dept: CARDIOLOGY CLINIC | Age: 76
End: 2021-07-08

## 2021-07-08 NOTE — TELEPHONE ENCOUNTER
----- Message from Marti December, DO sent at 7/7/2021  8:08 PM EDT -----  Please let him know that there were no blood clots in his lungs.

## 2021-07-26 DIAGNOSIS — I25.10 CORONARY ARTERY DISEASE INVOLVING NATIVE CORONARY ARTERY OF NATIVE HEART WITHOUT ANGINA PECTORIS: ICD-10-CM

## 2021-07-26 RX ORDER — ROSUVASTATIN CALCIUM 10 MG/1
10 TABLET, COATED ORAL DAILY
Qty: 90 TABLET | Refills: 0 | Status: SHIPPED
Start: 2021-07-26 | End: 2021-10-25

## 2021-08-02 ENCOUNTER — TELEPHONE (OUTPATIENT)
Dept: CARDIOLOGY | Age: 76
End: 2021-08-02

## 2021-08-03 ENCOUNTER — HOSPITAL ENCOUNTER (OUTPATIENT)
Dept: CARDIOLOGY | Age: 76
Discharge: HOME | End: 2021-08-03
Payer: MEDICARE

## 2021-08-03 VITALS
DIASTOLIC BLOOD PRESSURE: 60 MMHG | RESPIRATION RATE: 16 BRPM | BODY MASS INDEX: 28.36 KG/M2 | WEIGHT: 214 LBS | HEART RATE: 70 BPM | HEIGHT: 73 IN | SYSTOLIC BLOOD PRESSURE: 112 MMHG

## 2021-08-03 DIAGNOSIS — R55 NEAR SYNCOPE: ICD-10-CM

## 2021-08-03 DIAGNOSIS — R06.09 DOE (DYSPNEA ON EXERTION): ICD-10-CM

## 2021-08-03 DIAGNOSIS — I25.10 CORONARY ARTERY DISEASE INVOLVING NATIVE CORONARY ARTERY OF NATIVE HEART WITHOUT ANGINA PECTORIS: ICD-10-CM

## 2021-08-03 LAB
LV EF: 55 %
LVEF MODALITY: NORMAL

## 2021-08-03 PROCEDURE — 93306 TTE W/DOPPLER COMPLETE: CPT

## 2021-08-03 PROCEDURE — 3430000000 HC RX DIAGNOSTIC RADIOPHARMACEUTICAL: Performed by: INTERNAL MEDICINE

## 2021-08-03 PROCEDURE — 78452 HT MUSCLE IMAGE SPECT MULT: CPT

## 2021-08-03 PROCEDURE — 2580000003 HC RX 258: Performed by: INTERNAL MEDICINE

## 2021-08-03 PROCEDURE — 93017 CV STRESS TEST TRACING ONLY: CPT

## 2021-08-03 PROCEDURE — A9500 TC99M SESTAMIBI: HCPCS | Performed by: INTERNAL MEDICINE

## 2021-08-03 PROCEDURE — 6360000002 HC RX W HCPCS: Performed by: INTERNAL MEDICINE

## 2021-08-03 RX ORDER — SODIUM CHLORIDE 0.9 % (FLUSH) 0.9 %
10 SYRINGE (ML) INJECTION PRN
Status: DISCONTINUED | OUTPATIENT
Start: 2021-08-03 | End: 2021-08-04 | Stop reason: HOSPADM

## 2021-08-03 RX ADMIN — REGADENOSON 0.4 MG: 0.08 INJECTION, SOLUTION INTRAVENOUS at 11:52

## 2021-08-03 RX ADMIN — SODIUM CHLORIDE, PRESERVATIVE FREE 10 ML: 5 INJECTION INTRAVENOUS at 11:52

## 2021-08-03 RX ADMIN — SODIUM CHLORIDE, PRESERVATIVE FREE 10 ML: 5 INJECTION INTRAVENOUS at 08:46

## 2021-08-03 RX ADMIN — Medication 28.3 MILLICURIE: at 11:52

## 2021-08-03 RX ADMIN — Medication 9.8 MILLICURIE: at 08:46

## 2021-08-03 RX ADMIN — SODIUM CHLORIDE, PRESERVATIVE FREE 10 ML: 5 INJECTION INTRAVENOUS at 11:53

## 2021-08-03 NOTE — PROCEDURES
[address] [facility] [address] [place] [phone]                Pharmacologic Stress Nuclear Gated SPECT Study    Name: [facility] Account Number: [de-identified]    :  1945          Sex: male         Date of Study:  8/3/2021    Height: 6' 1\" (185.4 cm)         Weight: 214 lb (97.1 kg)     Ordering Provider: Susu Desai DO          PCP: Sury Garcia DO      Cardiologist: Cr Patel MD             Interpreting Physician: Jyoti Sanchez MD  _________________________________________________________________________________    Indication:   Evaluation of extent and severity of coronary artery disease    Clinical History:   Patient has prior history of coronary artery disease. Resting ECG:    NE int .24 sec, QRS int .14 sec, QT int . 40 sec; HR 70 bpm  Normal sinus rhythm, First degree AV block and Left Bundle Branch Block    Procedure:   Pharmacologic stress testing was performed with regadenoson 0.4 mg for 15 seconds. The heart rate was 70 at baseline and thelma to 72 beats during the infusion. This corresponds to 50% of maximum predicted heart rate. The blood pressure at baseline was 112/60 and blood pressure at the end of infusion was 124/76. Blood pressure response was normal during the stress procedure. The patient tolerated the infusion well. ECG during the infusion did not change. IMAGING: Myocardial perfusion imaging was performed at rest 30-35 minutes following the intravenous injection of 9.8 mCi of (Tc-Sestamibi) followed by 10 ml of Normal Saline. As per infusion protocol, the patient was injected intravenously with 28.3 mCi of (Tc-Sestamibi) followed by 10 ml of Normal Saline. Gated post-stress tomographic imaging was performed 20-25 minutes after stress. FINDINGS: The overall quality of the study was good.      Left ventricular cavity size was noted to be normal.    Rotational analog analysis demonstrated no patient motion or abnormal extracardiac radioactivity. The gated SPECT stress imaging in the short, vertical long, and horizontal long axis demonstrated normal homogeneous tracer distribution throughout the myocardium. The resting images show no change. Gated SPECT left ventricular ejection fraction was calculated to be 55%, with normal myocardial thickening and wall motion. Impression:    1. ECG during the infusion did not change. 2. The myocardial perfusion imaging was normal with attenuation artifact. 3. Overall left ventricular systolic function was normal without regional wall motion abnormalities. 4. Low risk general pharmacologic stress test.    Thank you for sending your patient to this [employer].      Electronically signed by Mindy Calles MD on 8/3/21 at 12:53 PM EDT

## 2021-08-04 ENCOUNTER — TELEPHONE (OUTPATIENT)
Dept: CARDIOLOGY CLINIC | Age: 76
End: 2021-08-04

## 2021-08-04 NOTE — TELEPHONE ENCOUNTER
----- Message from Digna Calvert DO sent at 8/3/2021  9:36 PM EDT -----  Please let him know that his stress test was good.

## 2021-08-10 ENCOUNTER — TELEPHONE (OUTPATIENT)
Dept: CARDIOLOGY CLINIC | Age: 76
End: 2021-08-10

## 2021-08-11 ENCOUNTER — NURSE TRIAGE (OUTPATIENT)
Dept: OTHER | Facility: CLINIC | Age: 76
End: 2021-08-11

## 2021-08-11 NOTE — TELEPHONE ENCOUNTER
Reason for Disposition   MODERATE longstanding difficulty breathing (e.g., speaks in phrases, SOB even at rest, pulse 100-120) and SAME as normal    Answer Assessment - Initial Assessment Questions  1. RESPIRATORY STATUS: \"Describe your breathing? \" (e.g., wheezing, shortness of breath, unable to speak, severe coughing)       Sob with activity    2. ONSET: \"When did this breathing problem begin? \"       6 weeks    3. PATTERN \"Does the difficult breathing come and go, or has it been constant since it started? \"       Only with activity    4. SEVERITY: \"How bad is your breathing? \" (e.g., mild, moderate, severe)     - MILD: No SOB at rest, mild SOB with walking, speaks normally in sentences, can lay down, no retractions, pulse < 100.     - MODERATE: SOB at rest, SOB with minimal exertion and prefers to sit, cannot lie down flat, speaks in phrases, mild retractions, audible wheezing, pulse 100-120.     - SEVERE: Very SOB at rest, speaks in single words, struggling to breathe, sitting hunched forward, retractions, pulse > 120       Mild    5. RECURRENT SYMPTOM: \"Have you had difficulty breathing before? \" If so, ask: \"When was the last time? \" and \"What happened that time? \"       No    6. CARDIAC HISTORY: \"Do you have any history of heart disease? \" (e.g., heart attack, angina, bypass surgery, angioplasty)       Cad and pacemaker, afib, htn      7. LUNG HISTORY: \"Do you have any history of lung disease? \"  (e.g., pulmonary embolus, asthma, emphysema)      Denies    8. CAUSE: \"What do you think is causing the breathing problem? \"       Unsure    9. OTHER SYMPTOMS: \"Do you have any other symptoms? (e.g., dizziness, runny nose, cough, chest pain, fever)      occas dizziness if moves too fast, runny nose in am    10. PREGNANCY: \"Is there any chance you are pregnant? \" \"When was your last menstrual period? \"        N/a    11. TRAVEL: \"Have you traveled out of the country in the last month? \" (e.g., travel history, exposures) n/a    Protocols used: BREATHING DIFFICULTY-ADULT-[place]    Received call from Ortiz at [facility] with Red Flag Complaint. Brief description of triage: as above c/o chronic sob for at least 6 weeks was seen and worked up by cardiologist and found nothing wrong was just called by cardiology with the normal results, sob only with activity \, no cp states walks and does  his normal activity    Triage indicates for patient to be seen in 3 days if unable to go to [facility] or walkin    Care advice provided, patient verbalizes understanding; denies any other questions or concerns; instructed to call back for any new or worsening symptoms. Writer provided warm transfer to Alena Crowe at [facility] for appointment scheduling. Attention Provider: Thank you for allowing me to participate in the care of your patient. The patient was connected to triage in response to information provided to the E[facility]. Please do not respond through this encounter as the response is not directed to a shared pool.

## 2021-08-17 ENCOUNTER — OFFICE VISIT (OUTPATIENT)
Dept: PRIMARY CARE CLINIC | Age: 76
End: 2021-08-17
Payer: MEDICARE

## 2021-08-17 VITALS
SYSTOLIC BLOOD PRESSURE: 146 MMHG | OXYGEN SATURATION: 96 % | BODY MASS INDEX: 28.63 KG/M2 | TEMPERATURE: 95 F | HEART RATE: 70 BPM | WEIGHT: 217 LBS | DIASTOLIC BLOOD PRESSURE: 84 MMHG

## 2021-08-17 DIAGNOSIS — R06.02 SOB (SHORTNESS OF BREATH): Primary | ICD-10-CM

## 2021-08-17 PROCEDURE — 99213 OFFICE O/P EST LOW 20 MIN: CPT | Performed by: FAMILY MEDICINE

## 2021-08-17 ASSESSMENT — ENCOUNTER SYMPTOMS
COUGH: 0
SHORTNESS OF BREATH: 1
WHEEZING: 0

## 2021-08-17 NOTE — PROGRESS NOTES
Xu Villalba, a male of 67 y.o. came to the office 8/17/2021. Patient Active Problem List   Diagnosis    Hyperlipidemia    Hand pain, right    Bronchitis, acute    Headache    Tinnitus    CAD (coronary artery disease)    Mobitz (type) I (Wenckebach's) atrioventricular block    Coronary artery disease due to lipid rich plaque    Chest pain    Second degree AV block, Mobitz type II    Pacemaker    PAF (paroxysmal atrial fibrillation) ([facility])          Shortness of Breath  This is a new problem. The current episode started more than 1 month ago (began 2 wks after his left hip replacement on 6/2/21). The problem has been unchanged. Pertinent negatives include no chest pain, leg swelling or wheezing. Exacerbated by: bending over, stairs. Treatments tried: saw Dr Susan Wills his cardiologist who did labs for blood clot, cta of chest, echo and stress test and all was ok. - no sob when walks a mile   - he has gained some wt since surgeries and covid.     No Known Allergies    Current Outpatient Medications on File Prior to Visit   Medication Sig Dispense Refill    Coenzyme Q10 (CO Q 10 PO) Take by mouth daily      rosuvastatin (CRESTOR) 10 MG tablet Take 1 tablet by mouth daily 90 tablet 0    amLODIPine (NORVASC) 5 MG tablet Take 1 tablet by mouth daily 90 tablet 3    clopidogrel (PLAVIX) 75 MG tablet Take 1 tablet by mouth daily 90 tablet 3    Cholecalciferol (VITAMIN D3) 50 MCG (2000 UT) CAPS Take by mouth      metoprolol succinate (TOPROL XL) 25 MG extended release tablet Take 1 tablet by mouth daily 90 tablet 3    apixaban (ELIQUIS) 5 MG TABS tablet Take 1 tablet by mouth 2 times daily 180 tablet 3    acetaminophen (TYLENOL) 500 MG tablet Take 500 mg by mouth as needed for Pain      Multiple Vitamins-Minerals (CENTRUM SILVER ADULT 50+) TABS Take 1 tablet by mouth daily      Omega-3 Fatty Acids (FISH OIL EXTRA STRENGTH PO) Take 1,000 mg by mouth daily       Specialty Vitamins Products (PROSTATE PO)

## 2021-09-17 ENCOUNTER — TELEPHONE (OUTPATIENT)
Dept: NON INVASIVE DIAGNOSTICS | Age: 76
End: 2021-09-17

## 2021-09-21 ENCOUNTER — TELEPHONE (OUTPATIENT)
Dept: NON INVASIVE DIAGNOSTICS | Age: 76
End: 2021-09-21

## 2021-09-21 NOTE — TELEPHONE ENCOUNTER
Sully Zhu MD   Physician   Specialty:  Electrophysiology   Telephone Encounter      Signed   Encounter Date:  9/17/2021               Signed             Show:Clear all  [x]Manual[]Template[x]Copied    Added by:  Brian Musa MD    []Hover for details  AT/AF [place] histograms show persistent AF since 6/15/2021.     Please schedule DC-cardioversion after 3 weeks of uninterrupted [address] if patient agrees. Thanks.                  ----- Message from Ady Brito RN sent at 9/20/2021  7:49 AM EDT -----    ----- Message -----  From: Sully Zhu MD  Sent: 9/17/2021   4:16 PM EDT  To: Ady Brito RN    Thanks.

## 2021-09-30 ENCOUNTER — TELEPHONE (OUTPATIENT)
Dept: CARDIAC CATH/INVASIVE PROCEDURES | Age: 76
End: 2021-09-30

## 2021-10-01 ENCOUNTER — ANESTHESIA EVENT (OUTPATIENT)
Dept: CARDIAC CATH/INVASIVE PROCEDURES | Age: 76
End: 2021-10-01

## 2021-10-01 ENCOUNTER — HOSPITAL ENCOUNTER (OUTPATIENT)
Dept: CARDIAC CATH/INVASIVE PROCEDURES | Age: 76
Discharge: HOME | End: 2021-10-01
Payer: MEDICARE

## 2021-10-01 ENCOUNTER — TELEPHONE (OUTPATIENT)
Dept: PRIMARY CARE CLINIC | Age: 76
End: 2021-10-01

## 2021-10-01 ENCOUNTER — ANESTHESIA (OUTPATIENT)
Dept: CARDIAC CATH/INVASIVE PROCEDURES | Age: 76
End: 2021-10-01

## 2021-10-01 VITALS
DIASTOLIC BLOOD PRESSURE: 85 MMHG | SYSTOLIC BLOOD PRESSURE: 146 MMHG | RESPIRATION RATE: 25 BRPM | OXYGEN SATURATION: 92 %

## 2021-10-01 VITALS
HEART RATE: 71 BPM | HEIGHT: 73 IN | OXYGEN SATURATION: 94 % | DIASTOLIC BLOOD PRESSURE: 88 MMHG | SYSTOLIC BLOOD PRESSURE: 124 MMHG | RESPIRATION RATE: 20 BRPM | BODY MASS INDEX: 28.49 KG/M2 | WEIGHT: 215 LBS

## 2021-10-01 LAB
ANION GAP SERPL CALCULATED.3IONS-SCNC: 8 MMOL/L (ref 7–16)
BASOPHILS ABSOLUTE: 0.02 E9/L (ref 0–0.2)
BASOPHILS RELATIVE PERCENT: 0.4 % (ref 0–2)
BUN BLDV-MCNC: 16 MG/DL (ref 6–23)
CALCIUM SERPL-MCNC: 9.4 MG/DL (ref 8.6–10.2)
CHLORIDE BLD-SCNC: 108 MMOL/L (ref 98–107)
CO2: 23 MMOL/L (ref 22–29)
CREAT SERPL-MCNC: 0.9 MG/DL (ref 0.7–1.2)
EOSINOPHILS ABSOLUTE: 0.18 E9/L (ref 0.05–0.5)
EOSINOPHILS RELATIVE PERCENT: 3.8 % (ref 0–6)
GFR AFRICAN AMERICAN: >60
GFR NON-AFRICAN AMERICAN: >60 ML/MIN/1.73
GLUCOSE BLD-MCNC: 97 MG/DL (ref 74–99)
HCT VFR BLD CALC: 42.9 % (ref 37–54)
HEMOGLOBIN: 15 G/DL (ref 12.5–16.5)
IMMATURE GRANULOCYTES #: 0.02 E9/L
IMMATURE GRANULOCYTES %: 0.4 % (ref 0–5)
LYMPHOCYTES ABSOLUTE: 1.05 E9/L (ref 1.5–4)
LYMPHOCYTES RELATIVE PERCENT: 21.9 % (ref 20–42)
MAGNESIUM: 2.4 MG/DL (ref 1.6–2.6)
MCH RBC QN AUTO: 30.1 PG (ref 26–35)
MCHC RBC AUTO-ENTMCNC: 35 % (ref 32–34.5)
MCV RBC AUTO: 86.1 FL (ref 80–99.9)
MONOCYTES ABSOLUTE: 0.6 E9/L (ref 0.1–0.95)
MONOCYTES RELATIVE PERCENT: 12.5 % (ref 2–12)
NEUTROPHILS ABSOLUTE: 2.92 E9/L (ref 1.8–7.3)
NEUTROPHILS RELATIVE PERCENT: 61 % (ref 43–80)
PDW BLD-RTO: 13.6 FL (ref 11.5–15)
PLATELET # BLD: 166 E9/L (ref 130–450)
PMV BLD AUTO: 8.7 FL (ref 7–12)
POTASSIUM SERPL-SCNC: 4.5 MMOL/L (ref 3.5–5)
RBC # BLD: 4.98 E12/L (ref 3.8–5.8)
SODIUM BLD-SCNC: 139 MMOL/L (ref 132–146)
WBC # BLD: 4.8 E9/L (ref 4.5–11.5)

## 2021-10-01 PROCEDURE — 93286 PERI-PX EVAL PM/LDLS PM IP: CPT | Performed by: INTERNAL MEDICINE

## 2021-10-01 PROCEDURE — 93005 ELECTROCARDIOGRAM TRACING: CPT | Performed by: INTERNAL MEDICINE

## 2021-10-01 PROCEDURE — 3700000000 HC ANESTHESIA ATTENDED CARE

## 2021-10-01 PROCEDURE — 6360000002 HC RX W HCPCS: Performed by: NURSE ANESTHETIST, CERTIFIED REGISTERED

## 2021-10-01 PROCEDURE — 92960 CARDIOVERSION ELECTRIC EXT: CPT | Performed by: INTERNAL MEDICINE

## 2021-10-01 PROCEDURE — 85025 COMPLETE CBC W/AUTO DIFF WBC: CPT

## 2021-10-01 PROCEDURE — 3700000001 HC ADD 15 MINUTES (ANESTHESIA)

## 2021-10-01 PROCEDURE — 36415 COLL VENOUS BLD VENIPUNCTURE: CPT

## 2021-10-01 PROCEDURE — 2709999900 HC NON-CHARGEABLE SUPPLY

## 2021-10-01 PROCEDURE — 80048 BASIC METABOLIC PNL TOTAL CA: CPT

## 2021-10-01 PROCEDURE — 83735 ASSAY OF MAGNESIUM: CPT

## 2021-10-01 PROCEDURE — 2580000003 HC RX 258: Performed by: NURSE ANESTHETIST, CERTIFIED REGISTERED

## 2021-10-01 RX ORDER — METOPROLOL SUCCINATE 25 MG/1
25 TABLET, EXTENDED RELEASE ORAL 2 TIMES DAILY
Qty: 180 TABLET | Refills: 3 | Status: SHIPPED | OUTPATIENT
Start: 2021-10-01 | End: 2022-10-05

## 2021-10-01 RX ORDER — SODIUM CHLORIDE 0.9 % (FLUSH) 0.9 %
5-40 SYRINGE (ML) INJECTION EVERY 12 HOURS SCHEDULED
Status: DISCONTINUED | OUTPATIENT
Start: 2021-10-01 | End: 2021-10-02 | Stop reason: HOSPADM

## 2021-10-01 RX ORDER — SODIUM CHLORIDE 9 MG/ML
INJECTION, SOLUTION INTRAVENOUS CONTINUOUS
Status: DISCONTINUED | OUTPATIENT
Start: 2021-10-01 | End: 2021-10-02 | Stop reason: HOSPADM

## 2021-10-01 RX ORDER — SODIUM CHLORIDE 0.9 % (FLUSH) 0.9 %
5-40 SYRINGE (ML) INJECTION PRN
Status: DISCONTINUED | OUTPATIENT
Start: 2021-10-01 | End: 2021-10-02 | Stop reason: HOSPADM

## 2021-10-01 RX ORDER — SODIUM CHLORIDE 9 MG/ML
INJECTION, SOLUTION INTRAVENOUS CONTINUOUS PRN
Status: DISCONTINUED | OUTPATIENT
Start: 2021-10-01 | End: 2021-10-01 | Stop reason: SDUPTHER

## 2021-10-01 RX ORDER — SODIUM CHLORIDE 9 MG/ML
25 INJECTION, SOLUTION INTRAVENOUS PRN
Status: DISCONTINUED | OUTPATIENT
Start: 2021-10-01 | End: 2021-10-02 | Stop reason: HOSPADM

## 2021-10-01 RX ORDER — PROPOFOL 10 MG/ML
INJECTION, EMULSION INTRAVENOUS PRN
Status: DISCONTINUED | OUTPATIENT
Start: 2021-10-01 | End: 2021-10-01 | Stop reason: SDUPTHER

## 2021-10-01 RX ADMIN — PROPOFOL 80 MG: 10 INJECTION, EMULSION INTRAVENOUS at 14:47

## 2021-10-01 RX ADMIN — SODIUM CHLORIDE: 9 INJECTION, SOLUTION INTRAVENOUS at 14:30

## 2021-10-01 ASSESSMENT — ENCOUNTER SYMPTOMS: SHORTNESS OF BREATH: 1

## 2021-10-01 NOTE — TELEPHONE ENCOUNTER
Dr. Martina Frazier office called stating Lilly Gary is having a root canal done on Monday 10/11/2021 and they want to know if he can come off of his Plavix for the procedure. He is also on eliquis but they contacted the cardiologist and he cant come off of it as he was having a cardioversion today.

## 2021-10-01 NOTE — PROCEDURES
[address] [facility]  Procedure Report  PATIENT: Emir Oro  MEDICAL RECORD NUMBER: [id number]  DATE OF PROCEDURE:  10/1/2021  ATTENDING ELECTROPHYSIOLOGIST:  Shen Mac MD  REFERRING PHYSICIAN: Dr. Alexis Ruiz:    1. Direct Current Electrical Cardioversion  2. [place] procedure device interrogation    INDICATION: Persistent atrial fibrillation    PROCEDURE PERFORMED By: Shen Mac MD    PROCEDURE TIME: Thirty minutes    COMPICATIONS: None immediately apparent    ANESTHESIA: LMAC    DESCRIPTION OF PROCEDURE: The risks, benefits, and alternatives to the procedure were discussed with the patient, and informed consent was obtained. The patient was brought to the cardiovascular lab and sedated under the guidance of anesthesia. Once anesthesia was deemed adequate, a 200 J biphasic synchronous shock was applied and successfully restored normal sinus rhythm. The patient was then allowed to wake in the usual fashion. The pacemaker was interrogated and reprogrammed before and after the procedure. Comment: The patient was therapeutically anticoagulated for a minimum of 3 consecutive weeks prior to cardioversion. Device Interrogation:   Make/Model [employer] Accolade MRI  Mode DDDR 60/120  Battery Voltage/Longevity: 8 years    Pacing: A: 1%  RV: 97%    P wave: 6.6 mV  Impedance: 595 ohms   Threshold: 0.5 V @ 0.4 ms  RV R wave: Paced Impedance: 630 ohms   Threshold: 0.5 V @ 0.4 ms  Episodes: AF until CV  Reprogramming included: Increased lower rate to 70 bpm  Overall device function is normal    All device programmable settings were evaluated per above and in the scanned document, along with iterative adjustments (capture thresholds) to assess and select the most appropriate final programming to provide for consistent delivery of the appropriate therapy and to verify function of the device.          SUMMARY:  1. Successful cardioversion of persistent atrial fibrillation to sinus rhythm. 2. Normal pacemaker function. RECOMMENDATIONS:  1. Discharge to home when fully awake and alert, if clinically stable. 2. Increase Toprol XL to 25 mg bid. 3. Resume all pre-procedure medications, including anticoagulation. 4. Follow-up in 1 week for remote interrogation and in 1 month in the office.     Duane Andres MD  Cardiac Electrophysiology  [address]   [facility] [place]  2:43 PM  10/1/2021

## 2021-10-01 NOTE — H&P
Cardiac Electrophysiology History and Physical Examination    Shanel Workman  1945  Date of Service: 10/1/2021  PCP: Miguel Doan DO  Cardiologist: Scotty Amos DO  Electrophysiologist: Ramirez Mooney MD     Subjective: Shanel Workman is seen in cardiac electrophysiology clinic for second degree AV block Mobitz II AVB status post dual chamber permanent pacemaker implantation on 9/23/20. He was found to have 19 hours of PAF on 10/6/20 and was started on Eliquis 5 mg bid on 10/8/20. Since last office visit, he was noted to be in persistent AF since 6/15/21. I explained the risks & benefits of a CV to the patient. These include but are not limited to sedation, allergy, aspiration, respiratory distress/arrest,  burning of skin, stroke and death. He verbalizes understanding and agrees to proceed with the procedure.     Patient Active Problem List   Diagnosis    Hyperlipidemia    Hand pain, right    Bronchitis, acute    Headache    Tinnitus    CAD (coronary artery disease)    Mobitz (type) I (Wenckebach's) atrioventricular block    Coronary artery disease due to lipid rich plaque    Chest pain    Second degree AV block, Mobitz type II    Pacemaker    PAF (paroxysmal atrial fibrillation) (HCC)    Persistent atrial fibrillation (HCC)     Current Outpatient Medications   Medication Sig Dispense Refill    Coenzyme Q10 (CO Q 10 PO) Take by mouth daily      rosuvastatin (CRESTOR) 10 MG tablet Take 1 tablet by mouth daily 90 tablet 0    amLODIPine (NORVASC) 5 MG tablet Take 1 tablet by mouth daily 90 tablet 3    clopidogrel (PLAVIX) 75 MG tablet Take 1 tablet by mouth daily 90 tablet 3    Cholecalciferol (VITAMIN D3) 50 MCG (2000 UT) CAPS Take by mouth      metoprolol succinate (TOPROL XL) 25 MG extended release tablet Take 1 tablet by mouth daily 90 tablet 3    apixaban (ELIQUIS) 5 MG TABS tablet Take 1 tablet by mouth 2 times daily 180 tablet 3    acetaminophen (TYLENOL) 500 MG tablet Take 500 mg by mouth as needed for Pain      Multiple Vitamins-Minerals (CENTRUM SILVER ADULT 50+) TABS Take 1 tablet by mouth daily      Omega-3 Fatty Acids (FISH OIL EXTRA STRENGTH PO) Take 1,000 mg by mouth daily       Specialty Vitamins Products (PROSTATE PO) Take by mouth daily       Current Facility-Administered Medications   Medication Dose Route Frequency Provider Last Rate Last Admin    0.9 % sodium chloride infusion   IntraVENous Continuous Samira Alcantara MD         Facility-Administered Medications Ordered in Other Encounters   Medication Dose Route Frequency Provider Last Rate Last Admin    0.9 % sodium chloride infusion   IntraVENous Continuous PRN MOSES Cervantes - CRNA   New Bag at 10/01/21 1430     No Known Allergies    ROS:   Constitutional: Negative for fever, activity change and appetite change. HENT: Negative for epistaxis, difficulty swallowing. Eyes: Negative for blurred vision or double vision. Respiratory: Negative for cough, chest tightness, shortness of breath and wheezing. Cardiovascular: per HPI. Gastrointestinal: Negative for abdominal pain, heartburn, or blood in stool. Genitourinary: Negative for hematuria, burning or frequency. Musculoskeletal: Negative for myalgias, stiffness, or swelling. Skin: Negative for rash, pain, or lumps. Neurological: Negative for syncope, seizures, or headaches. Psychiatric/Behavioral: Negative for confusion and agitation. The patient is not nervous/anxious. PHYSICAL EXAM:  Vitals:    10/01/21 1201   Weight: 215 lb (97.5 kg)   Height: 6' 1\" (1.854 m)     Constitutional: Oriented to person, place, and time. Well-developed and cooperative. Head: Normocephalic and atraumatic. Musculoskeletal: Normal range of motion of all extremities, no muscle weakness. Neurological: Alert and oriented to person, place, and time. Skin: Skin is warm and dry. No bruising, no ecchymosis and no rash noted.    Extremity: No abnormalities seen. Normal left ventricular wall thickness. Right Ventricle   Normal right ventricular size and function. Left Atrium   The left atrium visually appears mildly dilated. Indexed volume appears under measured at 26 ml/m2. The interatrial septum appears intact. Right Atrium   Normal right atrial size. Mitral Valve   Structurally normal mitral valve. No evidence of mitral valve stenosis. Physiologic mitral regurgitation. Tricuspid Valve   The tricuspid valve appears structurally normal.   Physiologic and/or trace tricuspid regurgitation. Aortic Valve   Structurally normal aortic valve. The aortic valve is trileaflet. No hemodynamically significant aortic stenosis is present. No evidence of aortic valve regurgitation. Pulmonic Valve   The pulmonic valve was not well visualized. No evidence of pulmonic valve stenosis. No evidence of any pulmonic regurgitation. Pericardial Effusion   No evidence of pericardial effusion. Aorta   Aortic root dimension within normal limits. Miscellaneous   Normal Inferior Vena Cava diameter and respiratory variation. Conclusions      Summary   Normal left ventricular size and systolic function. Ejection fraction is visually estimated at 55-60%. Indeterminate diastolic function. No regional wall motion abnormalities seen. Normal left ventricular wall thickness. Normal right ventricular size and function. The left atrium visually appears mildly dilated. No significant valvular abnormalities.       Signature      ----------------------------------------------------------------   Electronically signed by Juan Kaminski MD(Interpreting   physician) on 09/23/2020 01:22 PM   ----------------------------------------------------------------     M-Mode/2D Measurements & Calculations      LV Diastolic    LV Systolic Dimension: 3.7   AV Cusp Separation: 2.1 cmLA   Dimension: 5.5  cm Dimension: 3.9 cmAO Root   cm              LV Volume Diastolic: 172.2   Dimension: 2.8 cm   LV FS:32.7 %    ml   LV PW           LV Volume Systolic: 08.9 ml   Diastolic: 0.9  LV EDV/LV EDV Index: 146.5   cm              ml/68 ml/m^2LV ESV/LV ESV    RV Diastolic Dimension: 3.7   Septum          Index: 56.7 ml/26ml/ m^2     cm   Diastolic: 0.8  EF Calculated: 61.3 %   cm              LV Mass Index: 80 l/min*m^2  Ascending Aorta: 3 cm                                                LA volume/Index: 56.1 ml   LV Mass: 172.72                              /26.11ml/m^2   g               LVOT: 2 cm                   RA Area: 15.3 cm^2     Doppler Measurements & Calculations      MV Peak E-Wave: 0.57 AV Peak Velocity: 1.29 LVOT Peak Velocity: 1.07 m/s   m/s                  m/s                    LVOT Mean Velocity: 0.78 m/s   MV Peak A-Wave: 0.76 AV Peak Gradient: 6.65 LVOT Peak Gradient: 4.6   m/s                  mmHg                   mmHgLVOT Mean Gradient: 2.7   MV E/A Ratio: 0.74   AV Mean Velocity: 0.92 mmHg   MV Peak Gradient:    m/s   2.8 mmHg             AV Mean Gradient: 3.8   MV Mean Gradient:    mmHg   1.1 mmHg             AV VTI: 27 cm   MV Mean Velocity:    AV Area   0.48 m/s             (Continuity):2.66 cm^2 PV Peak Velocity: 1.18 m/s   MV Deceleration                             PV Peak Gradient: 5.53 mmHg   Time: 226.5 msec     LVOT VTI: 22.9 cm      PV Mean Velocity: 0.76 m/s   MV P1/2t: 71.6 msec  IVRT: 100.3 msec       PV Mean Gradient: 2.7 mmHg   MVA by PHT:3.07 cm^2   MV Area   (continuity): 3.2   cm^2   MV E' Septal   Velocity: 0.06 m/s   MV E' Lateral   Velocity: 7 m/s    Stress test 8/3/21:  FINDINGS: The overall quality of the study was good. Left ventricular cavity size was noted to be normal.     Rotational analog analysis demonstrated no patient motion or   abnormal extracardiac radioactivity.      The gated SPECT stress imaging in the short, vertical long, and   horizontal long axis demonstrated normal homogeneous tracer   distribution throughout the myocardium. The resting images show no change. Gated SPECT left ventricular ejection fraction was calculated to   be 55%, with normal myocardial thickening and wall motion. Impression:     1. ECG during the infusion did not change. 2. The myocardial perfusion imaging was normal with attenuation   artifact.     3. Overall left ventricular systolic function was normal without   regional wall motion abnormalities. 4. Low risk general pharmacologic stress test.     Thank you for sending your patient to this [facility]. Electronically signed by Belinda Ellis MD on 8/3/21 at 12:53 PM   EDT    [employer] [facility]OT--6/2020:  1. Sinus rhythm/sinus bradycardia. 2. Intermittent Mobitz I and Mobitz II AVB. 3. 5 beats of NSVT. 4. No AF or SVT. LHC(10/3/17): Left main:  Short.  No angiographically significant stenosis.  LAD:  Very tortous vessel, mid, eccentric, 80-85% diffuse stenosis right where the second diagonal takes off.  D1:  No angiographically significant stenosis. D2:  :Large vessel.  No angiographically significant stenosis. Circumflex:  No angiographically significant stenosis.  OM1:  Tiny vessel. OM2:  Tiny vessel. OM3:  Large vessel.  No angiographically significant stenosis. Right coronary artery:  Dominant vessel.  Proximal diffuse 30% stenosis.  Left ventriculogram:  Normal LV size and systolic function.  No wall motion abnormalities.  Ejection fraction of 60-65%.     Device Interrogation: 5/19/21   Make/Model [employer] Accolade MRI  Mode DDDR 60/130  Battery Voltage/Longevity: 12.5 years    Pacing: A: 25%  RV: 99%    P wave: 10.2 mV  Impedance: 743 ohms   Threshold: 0.6 V @ 0.4 ms  RV R wave: 22.5 mV  Impedance: 751 ohms   Threshold: 0.6 V @ 0.4 ms  Episodes: AF burden: <1% - 8 episodes <1 minute   Reprogramming included: see below  Overall device function is normal    All device programmable settings were evaluated per above and in the scanned document, along with iterative adjustments (capture thresholds) to assess and select the most appropriate final programming to provide for consistent delivery of the appropriate therapy and to verify function of the device. Impression:    1. Persistent atrial fibrillation  - [id number]-[id number] of 4 (age, HTN, CAD)  - Found to have 19 hours of PAF on 10/6/20.  - Started on Eliquis 5 mg bid on 10/8/20.   - On Toprol XL for rate control.  - Noted to be in persistent AF since 6/15/21.  - Presents in AF. - I explained the risks & benefits of a CV to the patient. These include but are not limited to sedation, allergy, aspiration, respiratory distress/arrest,  burning of skin, stroke and death. He verbalizes understanding and agrees to proceed with the procedure. - Re-education on importance of well controlled HTN (goal BP < 130/80), adequate weight control (goal BMI of < 27), physical activity consisting of moderate cardiopulmonary exercise up to a goal of 250 min/wk, daily compliance with CPAP in treating sleep apnea, smoking cessation and limited ETOH intake. 2. Pacemaker in situ   - DOI 09/23/2020  - [employer]; dual chamber  - Indication: second degree AV block Mobitz type II  - Normal device function. 3. Coronary artery disease  - Status post PCI to the LAD 10/3/17.  - On Plavix, Toprol XL and Crestor    4. Hyperlipidemia  - On Crestor    5. Hypertension  - Well controlled. - On Norvasc and Toprol XL     6. Status post THR    Recommendations:    1. Will proceed with DC-cardioversion. 2. Follow up after the procedure. Encouraged the patient to call the office for any questions or concerns. Thank you for allowing me to participate in your patient's care.     Giovanni Leventhal, MD  Cardiac Electrophysiology  [facility]  [facility] [place]  2:42 PM  10/1/2021

## 2021-10-01 NOTE — ANESTHESIA PRE PROCEDURE
Department of Anesthesiology  Preprocedure Note       Name:  Maira Fu   Age:  67 y.o.  :  1945                                          MRN:  [id number]         Date:  10/1/2021      Surgeon: * No surgeons listed *    Procedure:     Medications prior to admission:   Prior to Admission medications    Medication Sig Start Date End Date Taking?  Authorizing Provider   Coenzyme Q10 (CO Q 10 PO) Take by mouth daily    Historical Provider, MD   rosuvastatin (CRESTOR) 10 MG tablet Take 1 tablet by mouth daily 21   Cathy Billingsleyus,    amLODIPine (NORVASC) 5 MG tablet Take 1 tablet by mouth daily 21   Nikki Trejo, DO   clopidogrel (PLAVIX) 75 MG tablet Take 1 tablet by mouth daily 21   Nikki Trejo, DO   Cholecalciferol (VITAMIN D3) 50 MCG (2000) CAPS Take by mouth    Historical Provider, MD   metoprolol succinate (TOPROL XL) 25 MG extended release tablet Take 1 tablet by mouth daily 20   Fatmata Saeed MD   apixaban (ELIQUIS) 5 MG TABS tablet Take 1 tablet by mouth 2 times daily 10/14/20   Lance Chavez MD   acetaminophen (TYLENOL) 500 MG tablet Take 500 mg by mouth as needed for Pain    Historical Provider, MD   Multiple Vitamins-Minerals (CENTRUM SILVER ADULT 50+) TABS Take 1 tablet by mouth daily    Historical Provider, MD   Omega-3 Fatty Acids (FISH OIL EXTRA STRENGTH PO) Take 1,000 mg by mouth daily     Historical Provider, MD   Specialty Vitamins Products (PROSTATE PO) Take by mouth daily    Historical Provider, MD       Current medications:    Current Outpatient Medications   Medication Sig Dispense Refill    Coenzyme Q10 (CO Q 10 PO) Take by mouth daily      rosuvastatin (CRESTOR) 10 MG tablet Take 1 tablet by mouth daily 90 tablet 0    amLODIPine (NORVASC) 5 MG tablet Take 1 tablet by mouth daily 90 tablet 3    clopidogrel (PLAVIX) 75 MG tablet Take 1 tablet by mouth daily 90 tablet 3    Cholecalciferol (VITAMIN D3) 50 MCG (2000) CAPS Take by mouth  metoprolol succinate (TOPROL XL) 25 MG extended release tablet Take 1 tablet by mouth daily 90 tablet 3    apixaban (ELIQUIS) 5 MG TABS tablet Take 1 tablet by mouth 2 times daily 180 tablet 3    acetaminophen (TYLENOL) 500 MG tablet Take 500 mg by mouth as needed for Pain      Multiple Vitamins-Minerals (CENTRUM SILVER ADULT 50+) TABS Take 1 tablet by mouth daily      Omega-3 Fatty Acids (FISH OIL EXTRA STRENGTH PO) Take 1,000 mg by mouth daily       Specialty Vitamins Products (PROSTATE PO) Take by mouth daily       No current facility-administered medications for this visit.      Facility-Administered Medications Ordered in Other Visits   Medication Dose Route Frequency Provider Last Rate Last Admin    0.9 % sodium chloride infusion   IntraVENous Continuous Darryl Rivera MD           Allergies:  No Known Allergies    Problem List:    Patient Active Problem List   Diagnosis Code    Hyperlipidemia E78.5    Hand pain, right M79.641    Bronchitis, acute J20.9    Headache R51.9    Tinnitus H93.19    CAD (coronary artery disease) I25.10    Mobitz (type) I (Wenckebach's) atrioventricular block I44.1    Coronary artery disease due to lipid rich plaque I25.10, I25.83    Chest pain R07.9    Second degree AV block, Mobitz type II I44.1    Pacemaker Z95.0    PAF (paroxysmal atrial fibrillation) (HCC) I48.0       Past Medical History:        Diagnosis Date    BPH (benign prostatic hyperplasia)     Bronchitis, acute 03/2013    CAD (coronary artery disease)     Cancer (HCC)     squamous cell cancer of skin    Heart block AV second degree     mobitz    Hyperlipidemia     Hypertension     Mobitz I     Mobitz II 09/2020    Tinnitus        Past Surgical History:        Procedure Laterality Date    COLONOSCOPY      CORONARY ANGIOPLASTY WITH STENT PLACEMENT  10/03/2017    Dr Nolan Tavera LAD Mid Alpine 3x18mm    HAND SURGERY Right     Carpal tunnel & release trigger finger    HERNIA REPAIR      x2 [employer] HIP SURGERY Left     2021   [employer] HIP SURGERY Right     May 2020    JOINT REPLACEMENT      KNEE ARTHROSCOPY  1988    PACEMAKER INSERTION  2020    DUAL PPM     ([facility])    DR. uJlisa Wright    SKIN BIOPSY      SKIN CANCER EXCISION  2020    left cheek/right bottom eyelid    TONSILLECTOMY AND ADENOIDECTOMY Bilateral     TOTAL HIP ARTHROPLASTY Right 2020    Jose Collins TOTAL HIP ARTHROPLASTY Left 2021    TOTAL KNEE ARTHROPLASTY Left 2019    Dom Julanaly Rack    TOTAL KNEE ARTHROPLASTY Right 2019    Jose Collins VASECTOMY         Social History:    Social History     Tobacco Use    Smoking status: Former Smoker     Packs/day: 1.00     Years: 14.00     Pack years: 14.00     Types: Cigars     Quit date:      Years since quittin.    Smokeless tobacco: Never Used    Tobacco comment: occasional   Substance Use Topics    Alcohol use: Yes     Comment: occasionally                                Counseling given: Not Answered  Comment: occasional      Vital Signs (Current): There were no vitals filed for this visit.                                            BP Readings from Last 3 Encounters:   21 (!) 146/84   21 112/60   21 122/68       NPO Status:                                                    npo today                             BMI:   Wt Readings from Last 3 Encounters:   10/01/21 215 lb (97.5 kg)   21 217 lb (98.4 kg)   21 214 lb (97.1 kg)     There is no height or weight on file to calculate BMI.    CBC:   Lab Results   Component Value Date    WBC 4.8 10/01/2021    RBC 4.98 10/01/2021    HGB 15.0 10/01/2021    HCT 42.9 10/01/2021    MCV 86.1 10/01/2021    RDW 13.6 10/01/2021     10/01/2021       CMP:   Lab Results   Component Value Date     10/01/2021    K 4.5 10/01/2021     10/01/2021    CO2 23 10/01/2021    BUN 16 10/01/2021    CREATININE 0.9 10/01/2021    GFRAA >60 10/01/2021    LABGLOM >60 10/01/2021    GLUCOSE 97 10/01/2021    PROT 6.8 05/20/2021    CALCIUM 9.4 10/01/2021    BILITOT 0.6 05/20/2021    ALKPHOS 63 05/20/2021    AST 26 05/20/2021    ALT 15 05/20/2021       POC Tests: No results for input(s): POCGLU, POCNA, POCK, POCCL, POCBUN, POCHEMO, POCHCT in the last 72 hours. Coags:   Lab Results   Component Value Date    PROTIME 11.2 10/07/2020    INR 1.0 10/07/2020       HCG (If Applicable): No results found for: PREGTESTUR, PREGSERUM, HCG, HCGQUANT     ABGs: No results found for: PHART, PO2ART, HXC3MNA, EHU6EOR, BEART, H8KFOMQQ     Type & Screen (If Applicable):  No results found for: LABABO, LABRH    Drug/Infectious Status (If Applicable):  No results found for: HIV, HEPCAB    COVID-19 Screening (If Applicable):   Lab Results   Component Value Date    COVID19 Not Detected 09/17/2020     ECHO  Summary   Normal left ventricle size and systolic function. Mildly dilated right atrium. Mild posteriorly directed mitral regurgitation. Anesthesia Evaluation  Patient summary reviewed and Nursing notes reviewed no history of anesthetic complications:   Airway: Mallampati: II  TM distance: <3 FB   Neck ROM: full  Mouth opening: > = 3 FB Dental:          Pulmonary: breath sounds clear to auscultation  (+) shortness of breath: new,                             Cardiovascular:  Exercise tolerance: good (>4 METS),   (+) hypertension:, pacemaker: pacemaker, CAD: non-obstructive, CABG/stent (stent 2017):, dysrhythmias (bradycardia):, hyperlipidemia      ECG reviewed  Rhythm: regular  Rate: abnormal  Echocardiogram reviewed    Cleared by cardiology     Beta Blocker:  Dose within 24 Hrs         Neuro/Psych:   (+) headaches:,             GI/Hepatic/Renal: Neg GI/Hepatic/Renal ROS            Endo/Other:    (+) malignancy/cancer (Skin CA). Abdominal:             Vascular: Other Findings:               Anesthesia Plan      MAC     ASA 4       Induction: intravenous.       Anesthetic plan and risks discussed with patient. Plan discussed with surgical team and attending.                   MOSES Clifton - LAWSON   10/1/2021

## 2021-10-02 NOTE — ANESTHESIA POSTPROCEDURE EVALUATION
Department of Anesthesiology  Postprocedure Note    Patient: Oksana Bowen  MRN: [id number]  YOB: 1945  Date of evaluation: 10/2/2021  Time:  8:16 AM     Procedure Summary     Date: 10/01/21 Room / Location[place] [facility] [place]    Anesthesia Start: 7693 Anesthesia Stop: 6754    Procedure: CARDIOVERSION WITH ANESTHESIA Diagnosis:       Shortness of breath      Paroxysmal atrial fibrillation    Scheduled Providers:  Responsible Provider: Silvano Williamson DO    Anesthesia Type: MAC ASA Status: 4          Anesthesia Type: MAC    June Phase I:      June Phase II:      Last vitals: Reviewed and per EMR flowsheets.        Anesthesia Post Evaluation    Patient location during evaluation: bedside  Patient participation: complete - patient cannot participate  Level of consciousness: awake and alert  Airway patency: patent  Nausea & Vomiting: no nausea and no vomiting  Complications: no  Cardiovascular status: blood pressure returned to baseline  Respiratory status: acceptable  Hydration status: euvolemic

## 2021-10-04 ENCOUNTER — TELEPHONE (OUTPATIENT)
Dept: ADMINISTRATIVE | Age: 76
End: 2021-10-04

## 2021-10-04 LAB
EKG ATRIAL RATE: 70 BPM
EKG P AXIS: 26 DEGREES
EKG P-R INTERVAL: 184 MS
EKG Q-T INTERVAL: 478 MS
EKG QRS DURATION: 176 MS
EKG QTC CALCULATION (BAZETT): 516 MS
EKG R AXIS: -73 DEGREES
EKG T AXIS: 85 DEGREES
EKG VENTRICULAR RATE: 70 BPM

## 2021-10-04 PROCEDURE — 93010 ELECTROCARDIOGRAM REPORT: CPT | Performed by: INTERNAL MEDICINE

## 2021-10-05 RX ORDER — APIXABAN 5 MG/1
TABLET, FILM COATED ORAL
Qty: 180 TABLET | Refills: 3 | Status: SHIPPED
Start: 2021-10-05 | End: 2022-10-05

## 2021-11-03 ENCOUNTER — TELEPHONE (OUTPATIENT)
Dept: CARDIOLOGY CLINIC | Age: 76
End: 2021-11-03

## 2021-11-03 NOTE — TELEPHONE ENCOUNTER
Patient called stating he had an episode yesterday morning after lifting weights at the gym. He states he had some blurry vision and left sided jaw pain, like a toothache. His vision has cleared up but he still has the feeling of jaw pain. Please advise.

## 2021-11-03 NOTE — TELEPHONE ENCOUNTER
Patient notified of Dr. Yon Rock recommendation. Patient states the jaw pain resolved about (3) hours ago and he has been asymptomatic since. No blurry vision, no jaw pain. Please advise.

## 2021-11-03 NOTE — TELEPHONE ENCOUNTER
Stress test in August was good. Please notify the PCP of the symptoms. Follow-up in our office next available. Go to the emergency room if this happens again.

## 2021-11-04 NOTE — TELEPHONE ENCOUNTER
Patient notified of Dr. Tolu Mcpherson recommendation. F/U scheduled for 11/23/21 at 3:00 p.m. Patient asked if symptoms could be related to his AFIB. He was transferred to EP to discuss further.

## 2021-11-05 ENCOUNTER — TELEPHONE (OUTPATIENT)
Dept: NON INVASIVE DIAGNOSTICS | Age: 76
End: 2021-11-05

## 2021-11-05 NOTE — TELEPHONE ENCOUNTER
----- Message from Bessy Loyola MD sent at 11/4/2021  3:40 PM EDT -----  Please let him know that options are start AAD therapy with Tikosyn or Sotalol which required 3 days of hospitalization or rate control treatment only. Thanks.  ----- Message -----  From: Marifer Antunez RN  Sent: 11/4/2021  10:38 AM EDT  To: Bessy Loyola MD    See Sharad Hernandez report. Patient is back in AF. Had CV in October. Has office visit 12.15.2021.

## 2021-11-05 NOTE — TELEPHONE ENCOUNTER
Called and spoke with  Susannah Perdue. Reviewed Dr. Michael Glass recommendations. Patient states he does not want to be hospitalized because he does not have time. He agrees to do rate control only. Patient will call back if he changes his mind.        Melly Flannery

## 2021-11-19 ENCOUNTER — TELEPHONE (OUTPATIENT)
Dept: NON INVASIVE DIAGNOSTICS | Age: 76
End: 2021-11-19

## 2021-11-19 NOTE — TELEPHONE ENCOUNTER
Mr. Gutierrez Hunter called back and he wishes to treat his AF with either tikosyn or sotalol. (checking prices). The patient is leaving for [place] on 12/5/21 and will not return until May. I cannot schedule the admission with Dr. Hernan Vinson, but Dr. Karin De Jesus agreed to treat him on his consult week 11/30/21. Dr. Hernan Vinson please advise which AAD medication you would prefer and if ok to start AAD with the patient going out of state soon after the AAD admission? Thx     Dofetilide - $5.00 for 90 day supply  Sotalol - $5.00 for 90 day supply.

## 2021-11-19 NOTE — TELEPHONE ENCOUNTER
I prefer Tikosyn but he needs EKG 1 weeks post Tikosyn. Ne will need BMP and magnesium as well as EKG in 1 month then 3 months then every 6 months. Thanks.

## 2021-11-22 ASSESSMENT — LIFESTYLE VARIABLES
HOW OFTEN DURING THE LAST YEAR HAVE YOU NEEDED AN ALCOHOLIC DRINK FIRST THING IN THE MORNING TO GET YOURSELF GOING AFTER A NIGHT OF HEAVY DRINKING: NEVER
HOW MANY STANDARD DRINKS CONTAINING ALCOHOL DO YOU HAVE ON A TYPICAL DAY: ONE OR TWO
HOW OFTEN DURING THE LAST YEAR HAVE YOU BEEN UNABLE TO REMEMBER WHAT HAPPENED THE NIGHT BEFORE BECAUSE YOU HAD BEEN DRINKING: 0
AUDIT TOTAL SCORE: 0
HAS A RELATIVE, FRIEND, DOCTOR, OR ANOTHER HEALTH PROFESSIONAL EXPRESSED CONCERN ABOUT YOUR DRINKING OR SUGGESTED YOU CUT DOWN: 0
HOW OFTEN DURING THE LAST YEAR HAVE YOU HAD A FEELING OF GUILT OR REMORSE AFTER DRINKING: NEVER
HOW OFTEN DO YOU HAVE A DRINK CONTAINING ALCOHOL: 1
HOW OFTEN DO YOU HAVE SIX OR MORE DRINKS ON ONE OCCASION: 0
HAVE YOU OR SOMEONE ELSE BEEN INJURED AS A RESULT OF YOUR DRINKING: NO
HOW OFTEN DO YOU HAVE SIX OR MORE DRINKS ON ONE OCCASION: NEVER
AUDIT-C TOTAL SCORE: 0
HOW OFTEN DURING THE LAST YEAR HAVE YOU FAILED TO DO WHAT WAS NORMALLY EXPECTED FROM YOU BECAUSE OF DRINKING: 0
HOW OFTEN DURING THE LAST YEAR HAVE YOU BEEN UNABLE TO REMEMBER WHAT HAPPENED THE NIGHT BEFORE BECAUSE YOU HAD BEEN DRINKING: NEVER
AUDIT-C TOTAL SCORE: 1
HOW OFTEN DURING THE LAST YEAR HAVE YOU HAD A FEELING OF GUILT OR REMORSE AFTER DRINKING: 0
HOW OFTEN DO YOU HAVE A DRINK CONTAINING ALCOHOL: MONTHLY OR LESS
AUDIT TOTAL SCORE: 1
HAS A RELATIVE, FRIEND, DOCTOR, OR ANOTHER HEALTH PROFESSIONAL EXPRESSED CONCERN ABOUT YOUR DRINKING OR SUGGESTED YOU CUT DOWN: NO
HOW OFTEN DURING THE LAST YEAR HAVE YOU NEEDED AN ALCOHOLIC DRINK FIRST THING IN THE MORNING TO GET YOURSELF GOING AFTER A NIGHT OF HEAVY DRINKING: 0
HAVE YOU OR SOMEONE ELSE BEEN INJURED AS A RESULT OF YOUR DRINKING: 0
HOW OFTEN DURING THE LAST YEAR HAVE YOU FAILED TO DO WHAT WAS NORMALLY EXPECTED FROM YOU BECAUSE OF DRINKING: NEVER
HOW MANY STANDARD DRINKS CONTAINING ALCOHOL DO YOU HAVE ON A TYPICAL DAY: 0
HOW OFTEN DURING THE LAST YEAR HAVE YOU FOUND THAT YOU WERE NOT ABLE TO STOP DRINKING ONCE YOU HAD STARTED: 0
HOW OFTEN DURING THE LAST YEAR HAVE YOU FOUND THAT YOU WERE NOT ABLE TO STOP DRINKING ONCE YOU HAD STARTED: NEVER

## 2021-11-22 ASSESSMENT — PATIENT HEALTH QUESTIONNAIRE - PHQ9
SUM OF ALL RESPONSES TO PHQ9 QUESTIONS 1 & 2: 0
SUM OF ALL RESPONSES TO PHQ QUESTIONS 1-9: 0
SUM OF ALL RESPONSES TO PHQ QUESTIONS 1-9: 0
2. FEELING DOWN, DEPRESSED OR HOPELESS: 0
SUM OF ALL RESPONSES TO PHQ QUESTIONS 1-9: 0
1. LITTLE INTEREST OR PLEASURE IN DOING THINGS: 0

## 2021-11-22 NOTE — TELEPHONE ENCOUNTER
I spoke to Mrs. Kamar López and let her know that we can schedule the tikosyn admit w/ TB on 11/29, but they will have to push back their [place] trip a few days. She verbalized understanding and they will leave for [facility] after the admission and after they come to the office for the 1 week EKG as stated by Dr. Shameka Barahona. I called admitting and spoke to [facility] and scheduled admission for 11/29/21.

## 2021-11-23 ENCOUNTER — OFFICE VISIT (OUTPATIENT)
Dept: PRIMARY CARE CLINIC | Age: 76
End: 2021-11-23
Payer: MEDICARE

## 2021-11-23 VITALS
SYSTOLIC BLOOD PRESSURE: 118 MMHG | BODY MASS INDEX: 28.49 KG/M2 | WEIGHT: 215 LBS | OXYGEN SATURATION: 97 % | TEMPERATURE: 98 F | DIASTOLIC BLOOD PRESSURE: 74 MMHG | HEART RATE: 78 BPM | HEIGHT: 73 IN

## 2021-11-23 DIAGNOSIS — Z00.00 ROUTINE GENERAL MEDICAL EXAMINATION AT A HEALTH CARE FACILITY: Primary | ICD-10-CM

## 2021-11-23 PROCEDURE — G0439 PPPS, SUBSEQ VISIT: HCPCS | Performed by: FAMILY MEDICINE

## 2021-11-23 NOTE — PROGRESS NOTES
Medicare Annual Wellness Visit  Name: Beverly Moreno Date: 2021   MRN: [id number] Sex: Male   Age: 67 y.o. Ethnicity: Non- / Non    : 1945 Race: White (non-)      Starr Ashby is here for Medicare AWV    Screenings for behavioral, psychosocial and functional/safety risks, and cognitive dysfunction are all negative except as indicated below. These results, as well as other patient data from the [address] form, are documented in Flowsheets linked to this Encounter. No Known Allergies      Prior to Visit Medications    Medication Sig Taking?  Authorizing Provider   rosuvastatin (CRESTOR) 10 MG tablet TAKE 1 TABLET DAILY Yes Bart Sy DO   ELIQUIS 5 MG TABS tablet TAKE 1 TABLET TWICE A DAY Yes Vasyl Butler MD   metoprolol succinate (TOPROL XL) 25 MG extended release tablet Take 1 tablet by mouth 2 times daily Yes Vasyl Butler MD   Coenzyme Q10 (CO Q 10 PO) Take by mouth daily Yes Historical Provider, MD   amLODIPine (NORVASC) 5 MG tablet Take 1 tablet by mouth daily Yes Laura Donaldson DO   clopidogrel (PLAVIX) 75 MG tablet Take 1 tablet by mouth daily Yes Laura Donaldson DO   Cholecalciferol (VITAMIN D3) 50 MCG (2000 UT) CAPS Take by mouth Yes Historical Provider, MD   acetaminophen (TYLENOL) 500 MG tablet Take 500 mg by mouth as needed for Pain Yes Historical Provider, MD   Multiple Vitamins-Minerals (CENTRUM SILVER ADULT 50+) TABS Take 1 tablet by mouth daily Yes Historical Provider, MD   Omega-3 Fatty Acids (FISH OIL EXTRA STRENGTH PO) Take 1,000 mg by mouth daily  Yes Historical Provider, MD   Specialty Vitamins Products (PROSTATE PO) Take by mouth daily Yes Historical Provider, MD         Past Medical History:   Diagnosis Date    BPH (benign prostatic hyperplasia)     Bronchitis, acute 2013    CAD (coronary artery disease)     Cancer ([facility] [address]     squamous cell cancer of skin    Heart block AV second degree     aldo    Hyperlipidemia     Hypertension     Mobitz I     Mobitz II 09/2020    Tinnitus        Past Surgical History:   Procedure Laterality Date    CARDIOVERSION  10/01/2021    Succesful cardioversion A-fib to [facility]    COLONOSCOPY      CORONARY ANGIOPLASTY WITH STENT PLACEMENT  10/03/2017    Dr Mehdi Cordova LAD Mid Alpine 3x18mm    HAND SURGERY Right     Carpal tunnel & release trigger finger    HERNIA REPAIR      x2   [employer] HIP SURGERY Left     June 2, 2021   [employer] HIP SURGERY Right     May 2020    JOINT REPLACEMENT      KNEE ARTHROSCOPY  1988    PACEMAKER INSERTION  09/23/2020    DUAL PPM     ([facility])    DR. Rolanda Guido    SKIN BIOPSY      SKIN CANCER EXCISION  01/2020    left cheek/right bottom eyelid    TONSILLECTOMY AND ADENOIDECTOMY Bilateral     TOTAL HIP ARTHROPLASTY Right 05/13/2020    Mertzon Roch    TOTAL HIP ARTHROPLASTY Left 06/02/2021    TOTAL KNEE ARTHROPLASTY Left 05/13/2019    Dom Mertzon Roch    TOTAL KNEE ARTHROPLASTY Right 08/12/2019    Tamiko Junes VASECTOMY           Family History   Problem Relation Age of Onset    Cancer Mother         Colon     Age 80    Cancer Father         Back [de-identified] Age 68 tumor in the back    No Known Problems Sister     No Known Problems Brother     No Known Problems Other     No Known Problems Child     No Known Problems Child     No Known Problems Child     Cancer Brother         Colon & prostate    No Known Problems Brother     Cancer Brother         Melanoma  age 39       CareTeam (Including outside providers/suppliers regularly involved in providing care):   Patient Care Team:  Jessica Hoffmann DO as PCP - General (Family Medicine)  Jessica Hoffmann DO as PCP - [facility] Empaneled Provider  Stacie Pierre DO as Consulting Physician (Cardiology)  Marvin Garrido MD as Consulting Physician (Electrophysiology)    Wt Readings from Last 3 Encounters:   11/23/21 215 lb (97.5 kg)   10/01/21 215 lb (97.5 kg)   08/17/21 217 lb (98.4 kg)     Vitals: 11/23/21 0843   BP: 118/74   Pulse: 78   Temp: 98 °F (36.7 °C)   SpO2: 97%   Weight: 215 lb (97.5 kg)   Height: 6' 1\" (1.854 m)     Body mass index is 28.37 kg/m². Based upon direct observation of the patient, evaluation of cognition reveals recent and remote memory intact. General Appearance: alert and oriented to person, place and time, well developed and well- nourished, in no acute distress  Skin: warm and dry, no rash or erythema  Head: normocephalic and atraumatic  Eyes: pupils equal, round, and reactive to light, extraocular eye movements intact, conjunctivae normal  ENT: tympanic membrane, external ear and ear canal normal bilaterally, nose without deformity, nasal mucosa and turbinates normal without polyps  Neck: supple and non-tender without mass, no thyromegaly or thyroid nodules, no cervical lymphadenopathy  Pulmonary/Chest: clear to auscultation bilaterally- no wheezes, rales or rhonchi, normal air movement, no respiratory distress  Cardiovascular: normal rate, regular rhythm, normal S1 and S2, no murmurs, rubs, clicks, or gallops, distal pulses intact, no carotid bruits  Abdomen: soft, non-tender, non-distended, normal bowel sounds, no masses or organomegaly  Extremities: no cyanosis, clubbing or edema  Musculoskeletal: normal range of motion, no joint swelling, deformity or tenderness  Neurologic: reflexes normal and symmetric, no cranial nerve deficit, gait, coordination and speech normal    Patient's complete Health Risk Assessment and screening values have been reviewed and are found in Flowsheets. The following problems were reviewed today and where indicated follow up appointments were made and/or referrals ordered. Positive Risk Factor Screenings with Interventions:            General Health and ACP:  General  In general, how would you say your health is?: Fair  In the past 7 days, have you experienced any of the following?  New or Increased Pain, New or Increased Fatigue, Loneliness, Social Isolation, Stress or Anger?: None of These  Do you get the social and emotional support that you need?: Yes  Do you have a Living Will?: Yes  Advance Directives     Power of  Living Will ACP-Advance Directive ACP-Power of     Not on File Not on File Not on File Not on File      General Health Risk Interventions:  ·         Personalized Preventive Plan   Current Health Maintenance Status  Immunization History   Administered Date(s) Administered    COVID-19, Pfizer, PF, 30mcg/0.3mL 02/08/2021, 03/01/2021, 09/27/2021    Influenza Vaccine, unspecified formulation 10/21/2015    Influenza, Quadv, IM, PF (6 mo and older Fluzone, Flulaval, Fluarix, and 3 yrs and older Afluria) 10/04/2017    Pneumococcal Conjugate 13-valent ([id number]) 08/23/2018    Pneumococcal Polysaccharide ([id number]) 04/01/2013, 10/23/2013    Zoster Live (Zostavax) 04/01/2013        Health Maintenance   Topic Date Due    Hepatitis C screen  Never done    DTaP/Tdap/Td vaccine (1 - Tdap) Never done    Shingles Vaccine (2 of 3) 05/27/2013    Annual Wellness Visit (AWV)  11/13/2020    Flu vaccine (1) 09/01/2021    Lipid screen  05/20/2022    Pneumococcal 65+ years Vaccine  Completed    COVID-19 Vaccine  Completed    Hepatitis A vaccine  Aged Out    Hepatitis B vaccine  Aged Out    Hib vaccine  Aged Out    Meningococcal (ACWY) vaccine  Aged Out     Recommendations for [employer] Due: see orders and patient instructions/AVS.  . Recommended screening schedule for the next 5-10 years is provided to the patient in written form: see Patient Katharina Gastelum was seen today for medicare awv. Diagnoses and all orders for this visit:    Routine general medical examination at a health care facility           - continue current care, has follow up in several at [facility] in [address] [place] [address] to get started on Tikosyn and Satolol.

## 2021-11-23 NOTE — PATIENT INSTRUCTIONS
Personalized Preventive Plan for Malachi Ar - 11/23/2021  Medicare offers a range of preventive health benefits. Some of the tests and screenings are paid in full while other may be subject to a deductible, co-insurance, and/or copay. Some of these benefits include a comprehensive review of your medical history including lifestyle, illnesses that may run in your family, and various assessments and screenings as appropriate. After reviewing your medical record and screening and assessments performed today your provider may have ordered immunizations, labs, imaging, and/or referrals for you. A list of these orders (if applicable) as well as your Preventive Care list are included within your After Visit Summary for your review. Other Preventive Recommendations:    · A preventive eye exam performed by an eye specialist is recommended every 1-2 years to screen for glaucoma; cataracts, macular degeneration, and other eye disorders. · A preventive dental visit is recommended every 6 months. · Try to get at least 150 minutes of exercise per week or 10,000 steps per day on a pedometer . · Order or download the FREE \"Exercise & Physical Activity: Your Everyday Guide\" from [employer]. Call 9-974.346.1116 or search [employer] online. · You need 4295-7887 mg of calcium and 3466-3258 IU of vitamin D per day. It is possible to meet your calcium requirement with diet alone, but a vitamin D supplement is usually necessary to meet this goal.  · When exposed to the sun, use a sunscreen that protects against both UVA and UVB radiation with an SPF of 30 or greater. Reapply every 2 to 3 hours or after sweating, drying off with a towel, or swimming. · Always wear a seat belt when traveling in a car. Always wear a helmet when riding a bicycle or motorcycle.

## 2021-11-29 ENCOUNTER — HOSPITAL ENCOUNTER (INPATIENT)
Age: 76
LOS: 6 days | Discharge: HOME | DRG: 310 | End: 2021-12-02
Attending: STUDENT IN AN ORGANIZED HEALTH CARE EDUCATION/TRAINING PROGRAM | Admitting: STUDENT IN AN ORGANIZED HEALTH CARE EDUCATION/TRAINING PROGRAM
Payer: MEDICARE

## 2021-11-29 PROBLEM — I48.91 ATRIAL FIBRILLATION (HCC): Status: ACTIVE | Noted: 2021-11-29

## 2021-11-29 LAB
ANION GAP SERPL CALCULATED.3IONS-SCNC: 10 MMOL/L (ref 7–16)
BUN BLDV-MCNC: 23 MG/DL (ref 6–23)
CALCIUM SERPL-MCNC: 9.4 MG/DL (ref 8.6–10.2)
CHLORIDE BLD-SCNC: 102 MMOL/L (ref 98–107)
CO2: 24 MMOL/L (ref 22–29)
CREAT SERPL-MCNC: 1.1 MG/DL (ref 0.7–1.2)
GFR AFRICAN AMERICAN: >60
GFR NON-AFRICAN AMERICAN: >60 ML/MIN/1.73
GLUCOSE BLD-MCNC: 99 MG/DL (ref 74–99)
HCT VFR BLD CALC: 48.1 % (ref 37–54)
HEMOGLOBIN: 16.4 G/DL (ref 12.5–16.5)
MAGNESIUM: 2.2 MG/DL (ref 1.6–2.6)
MCH RBC QN AUTO: 30.7 PG (ref 26–35)
MCHC RBC AUTO-ENTMCNC: 34.1 % (ref 32–34.5)
MCV RBC AUTO: 89.9 FL (ref 80–99.9)
PDW BLD-RTO: 13.8 FL (ref 11.5–15)
PLATELET # BLD: 210 E9/L (ref 130–450)
PMV BLD AUTO: 9.1 FL (ref 7–12)
POTASSIUM SERPL-SCNC: 4.7 MMOL/L (ref 3.5–5)
RBC # BLD: 5.35 E12/L (ref 3.8–5.8)
SODIUM BLD-SCNC: 136 MMOL/L (ref 132–146)
T4 FREE: 1.08 NG/DL (ref 0.93–1.7)
TSH SERPL DL<=0.05 MIU/L-ACNC: 1.5 UIU/ML (ref 0.27–4.2)
WBC # BLD: 9.1 E9/L (ref 4.5–11.5)

## 2021-11-29 PROCEDURE — 93005 ELECTROCARDIOGRAM TRACING: CPT | Performed by: NURSE PRACTITIONER

## 2021-11-29 PROCEDURE — 6370000000 HC RX 637 (ALT 250 FOR IP): Performed by: NURSE PRACTITIONER

## 2021-11-29 PROCEDURE — 84443 ASSAY THYROID STIM HORMONE: CPT

## 2021-11-29 PROCEDURE — 80048 BASIC METABOLIC PNL TOTAL CA: CPT

## 2021-11-29 PROCEDURE — 99223 1ST HOSP IP/OBS HIGH 75: CPT | Performed by: STUDENT IN AN ORGANIZED HEALTH CARE EDUCATION/TRAINING PROGRAM

## 2021-11-29 PROCEDURE — 83735 ASSAY OF MAGNESIUM: CPT

## 2021-11-29 PROCEDURE — 2060000000 HC ICU INTERMEDIATE R&B

## 2021-11-29 PROCEDURE — 6370000000 HC RX 637 (ALT 250 FOR IP): Performed by: STUDENT IN AN ORGANIZED HEALTH CARE EDUCATION/TRAINING PROGRAM

## 2021-11-29 PROCEDURE — 2580000003 HC RX 258: Performed by: NURSE PRACTITIONER

## 2021-11-29 PROCEDURE — 36415 COLL VENOUS BLD VENIPUNCTURE: CPT

## 2021-11-29 PROCEDURE — 85027 COMPLETE CBC AUTOMATED: CPT

## 2021-11-29 PROCEDURE — 84439 ASSAY OF FREE THYROXINE: CPT

## 2021-11-29 RX ORDER — CLOPIDOGREL BISULFATE 75 MG/1
75 TABLET ORAL DAILY
Status: DISCONTINUED | OUTPATIENT
Start: 2021-11-30 | End: 2021-12-02 | Stop reason: HOSPADM

## 2021-11-29 RX ORDER — ACETAMINOPHEN 325 MG/1
650 TABLET ORAL EVERY 6 HOURS PRN
Status: DISCONTINUED | OUTPATIENT
Start: 2021-11-29 | End: 2021-12-02 | Stop reason: HOSPADM

## 2021-11-29 RX ORDER — PANTOPRAZOLE SODIUM 40 MG/1
40 TABLET, DELAYED RELEASE ORAL
Status: DISCONTINUED | OUTPATIENT
Start: 2021-11-30 | End: 2021-12-02 | Stop reason: HOSPADM

## 2021-11-29 RX ORDER — ROSUVASTATIN CALCIUM 10 MG/1
10 TABLET, COATED ORAL NIGHTLY
Status: DISCONTINUED | OUTPATIENT
Start: 2021-11-29 | End: 2021-12-02 | Stop reason: HOSPADM

## 2021-11-29 RX ORDER — SODIUM CHLORIDE 9 MG/ML
25 INJECTION, SOLUTION INTRAVENOUS PRN
Status: DISCONTINUED | OUTPATIENT
Start: 2021-11-29 | End: 2021-12-02 | Stop reason: HOSPADM

## 2021-11-29 RX ORDER — DOFETILIDE 0.5 MG/1
500 CAPSULE ORAL EVERY 12 HOURS SCHEDULED
Status: DISCONTINUED | OUTPATIENT
Start: 2021-11-29 | End: 2021-11-29

## 2021-11-29 RX ORDER — MULTIVITAMIN WITH IRON
1 TABLET ORAL DAILY
Status: DISCONTINUED | OUTPATIENT
Start: 2021-11-29 | End: 2021-12-02 | Stop reason: HOSPADM

## 2021-11-29 RX ORDER — SODIUM CHLORIDE 0.9 % (FLUSH) 0.9 %
10 SYRINGE (ML) INJECTION PRN
Status: DISCONTINUED | OUTPATIENT
Start: 2021-11-29 | End: 2021-12-02 | Stop reason: HOSPADM

## 2021-11-29 RX ORDER — SODIUM CHLORIDE 0.9 % (FLUSH) 0.9 %
10 SYRINGE (ML) INJECTION EVERY 12 HOURS SCHEDULED
Status: DISCONTINUED | OUTPATIENT
Start: 2021-11-29 | End: 2021-12-02 | Stop reason: HOSPADM

## 2021-11-29 RX ORDER — ACETAMINOPHEN 650 MG/1
650 SUPPOSITORY RECTAL EVERY 6 HOURS PRN
Status: DISCONTINUED | OUTPATIENT
Start: 2021-11-29 | End: 2021-12-02 | Stop reason: HOSPADM

## 2021-11-29 RX ORDER — DOFETILIDE 0.5 MG/1
500 CAPSULE ORAL EVERY 12 HOURS SCHEDULED
Status: DISCONTINUED | OUTPATIENT
Start: 2021-11-29 | End: 2021-12-02 | Stop reason: HOSPADM

## 2021-11-29 RX ORDER — AMLODIPINE BESYLATE 5 MG/1
5 TABLET ORAL DAILY
Status: DISCONTINUED | OUTPATIENT
Start: 2021-11-29 | End: 2021-12-02 | Stop reason: HOSPADM

## 2021-11-29 RX ORDER — METOPROLOL SUCCINATE 25 MG/1
25 TABLET, EXTENDED RELEASE ORAL 2 TIMES DAILY
Status: DISCONTINUED | OUTPATIENT
Start: 2021-11-29 | End: 2021-12-02 | Stop reason: HOSPADM

## 2021-11-29 RX ADMIN — APIXABAN 5 MG: 5 TABLET, FILM COATED ORAL at 21:03

## 2021-11-29 RX ADMIN — ROSUVASTATIN 10 MG: 10 TABLET, FILM COATED ORAL at 21:03

## 2021-11-29 RX ADMIN — METOPROLOL SUCCINATE 25 MG: 25 TABLET, EXTENDED RELEASE ORAL at 21:03

## 2021-11-29 RX ADMIN — Medication 1 TABLET: at 18:24

## 2021-11-29 RX ADMIN — DOFETILIDE 500 MCG: 0.5 CAPSULE ORAL at 22:14

## 2021-11-29 RX ADMIN — Medication 10 ML: at 21:03

## 2021-11-29 ASSESSMENT — PAIN SCALES - GENERAL
PAINLEVEL_OUTOF10: 0

## 2021-11-29 NOTE — H&P
[address] ELECTROPHYSIOLOGY DEPARTMENT/DIVISION OF CARDIOLOGY  H&P  PATIENT: Emir Oro  MEDICAL RECORD NUMBER: [id number]  DATE OF SERVICE:  11/29/2021  ATTENDING ELECTROPHYSIOLOGIST:  Kayleen Simmons DO  REFERRING PHYSICIAN: No ref. provider found and Avinash Ramos DO  CHIEF COMPLAINT: Atrial Fibrillation    HPI: Emir Oro is a 67 y.o. 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 He is managed by Beatrice Main and FAITH AMEZCUA with norvasc 5 mg daily, plavix 75 mg daily, apixaban 5 mg BID, metoprolol XL 25 mg BID, and crestor 10 mg daily. In 9/2020, he was diagnosed with 2* type II AV block and treated with BSCI dc PPM. In 10/2020, he was diagnosed with paroxysms of AF. In 10/2021, he was noted to be in persistent AF sicne 6/2021. He was treated with DCCV, which restored SR. He returned to AF in 11/2021. Dr FAITH AMEZCUA recommended tikosyn load. He presents today, 11/29/21; for initiation of tikosyn. He reports dyspnea with AF. He denies any other complaints at this time. Prior cardiac testing:  · ECG (10/1/21): A&V paced, QTc = 516 msec. · TTE (8/3/21): LVEF = 55%, mild KRISTI, mild MR, mild-moderate TR. · Pharm Nuc Stress (8/3/21): LVEF = 55%, normal perfusion except for attenuation artifact. · ECG (7/1/21): AF with , QTc = 479 msec. · Event monitor (7/1/20): SR, episodes of 2* type I and type II AV block, 1 episode of NSVT (5 bats), and no AF or SVT. · LHC (10/3/17): LVEF = 60-65%, 30% proximal RCA stenosis, 80-85% mid LAD stenosis treated with DIVINA x 1.     Past Medical History:   Diagnosis Date    BPH (benign prostatic hyperplasia)     Bronchitis, acute 03/2013    CAD (coronary artery disease)     Cancer (HCC)     squamous cell cancer of skin    Heart block AV second degree     mobitz Negative for fever, activity change and appetite change. HENT: Negative for epistaxis. Eyes: Negative for diploplia, blurred vision. Respiratory: Negative for cough, chest tightness, shortness of breath and wheezing. Cardiovascular: pertinent positives in HPI  Gastrointestinal: Negative for abdominal pain and blood in stool. Genitourinary: Negative for hematuria and difficulty urinating. Musculoskeletal: Negative for myalgias and gait problem. Skin: Negative for color change and rash. Neurological: Negative for syncope and light-headedness. Psychiatric/Behavioral: Negative for confusion and agitation. The patient is not nervous/anxious. Heme: no bleeding disorders, no melena or hematochezia  All other review of systems are negative     PHYSICAL EXAM:  VS: pending  Limited exam due to COVID-19 pandemic. Constitutional: NAD  Head: Normocephalic and atraumatic. Neck: supple and no JVD present  Cardiovascular: RRR  Pulmonary/Chest:  No respiratory distress, no audible wheeze  Abdominal: nondistended   Musculoskeletal: Normal range of motion of all extremities  Neurological: A&O x 3, grossly intact   Skin: Skin is warm and dry, CIED incision C/D/I without erythema, edema, or drainage  Extremity: no edema   Psychiatric: Normal mood and affect, A&O x3     Labs: pending    EKG (11/29/21): sinus with , QTc = 477 msec. Assessment/Plan:  1. Nonvalvular persistent AF sp DCCV (10/1/21)  -Initially diagnosed 10/2020.  -CHADSVASC = (age, CAD). Continue apixaban 5 mg BID. Consider discontinuing antiplatelet as last PCI > 1 year and not at high thrombotic risk. In the interim, recommend pantoprazole 40 mg daily to reduce GI bleeding risk.  -In 10/2021, he was noted to be in persistent AF sicne 6/2021. He was treated with DCCV, which restored SR. He returned to AF in 11/2021. Dr Choco Ziegler recommended tikosyn load. He presents today, 11/29/21; for initiation of tikosyn.  However, patient's baseline ECG with QTc of 477 msec with ventricular paced. CrCl > 60.  -Check stat labs. -Tentatively plan for tikosyn 500 mcg every 12 hours with continuous telemetry monitoring and ECG 2-3 hours after each tikosyn dose. -Anticipate discharge morning-afternoon 12/2/21.    2. 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  -Stable device function historically. -BSCI rep to interrogate device. 3. CAD sp DIVINA x 1 mid LAD (10/4/17)  -Management per Dr Micaela Wetzel.  -See #1 regarding antiplatelet therapy. I have spent a total of 60 minutes with the patient and his/her family reviewing the above stated recommendations. And a total of >50% of that time involved face-to-face time providing counseling and or coordination of care with the other providers. Thank you for allowing me to participate in your patient's care. Please call me if there are any questions. Shruthi Brannon D.O.   Cardiac Electrophysiology  [address] [place] [address]

## 2021-11-29 NOTE — PLAN OF CARE
Problem:  Activity:  Goal: Ability to tolerate increased activity will improve  Description: Ability to tolerate increased activity will improve  Outcome: Met This Shift  Goal: Expression of feelings of increased energy will increase  Description: Expression of feelings of increased energy will increase  Outcome: Met This Shift     Problem: Cardiac:  Goal: Ability to maintain an adequate cardiac output will improve  Description: Ability to maintain an adequate cardiac output will improve  Outcome: Met This Shift  Goal: Complications related to the disease process, condition or treatment will be avoided or minimized  Description: Complications related to the disease process, condition or treatment will be avoided or minimized  Outcome: Met This Shift     Problem: Coping:  Goal: Level of anxiety will decrease  Description: Level of anxiety will decrease  Outcome: Met This Shift  Goal: General experience of comfort will improve  Description: General experience of comfort will improve  Outcome: Met This Shift     Problem: Health Behavior:  Goal: Ability to manage health-related needs will improve  Description: Ability to manage health-related needs will improve  Outcome: Met This Shift     Problem: Safety:  Goal: Ability to remain free from injury will improve  Description: Ability to remain free from injury will improve  Outcome: Met This Shift  Goal: Will show no signs and symptoms of excessive bleeding  Description: Will show no signs and symptoms of excessive bleeding  Outcome: Met This Shift

## 2021-11-30 LAB
ANION GAP SERPL CALCULATED.3IONS-SCNC: 11 MMOL/L (ref 7–16)
BUN BLDV-MCNC: 21 MG/DL (ref 6–23)
CALCIUM SERPL-MCNC: 9.2 MG/DL (ref 8.6–10.2)
CHLORIDE BLD-SCNC: 103 MMOL/L (ref 98–107)
CO2: 27 MMOL/L (ref 22–29)
CREAT SERPL-MCNC: 1.1 MG/DL (ref 0.7–1.2)
EKG ATRIAL RATE: 70 BPM
EKG P AXIS: 59 DEGREES
EKG P-R INTERVAL: 172 MS
EKG Q-T INTERVAL: 442 MS
EKG QRS DURATION: 166 MS
EKG QTC CALCULATION (BAZETT): 477 MS
EKG R AXIS: -75 DEGREES
EKG T AXIS: 84 DEGREES
EKG VENTRICULAR RATE: 70 BPM
GFR AFRICAN AMERICAN: >60
GFR NON-AFRICAN AMERICAN: >60 ML/MIN/1.73
GLUCOSE BLD-MCNC: 87 MG/DL (ref 74–99)
MAGNESIUM: 2.2 MG/DL (ref 1.6–2.6)
POTASSIUM SERPL-SCNC: 4.6 MMOL/L (ref 3.5–5)
SODIUM BLD-SCNC: 141 MMOL/L (ref 132–146)

## 2021-11-30 PROCEDURE — 6370000000 HC RX 637 (ALT 250 FOR IP): Performed by: STUDENT IN AN ORGANIZED HEALTH CARE EDUCATION/TRAINING PROGRAM

## 2021-11-30 PROCEDURE — 6370000000 HC RX 637 (ALT 250 FOR IP): Performed by: NURSE PRACTITIONER

## 2021-11-30 PROCEDURE — 99233 SBSQ HOSP IP/OBS HIGH 50: CPT | Performed by: STUDENT IN AN ORGANIZED HEALTH CARE EDUCATION/TRAINING PROGRAM

## 2021-11-30 PROCEDURE — 93010 ELECTROCARDIOGRAM REPORT: CPT | Performed by: INTERNAL MEDICINE

## 2021-11-30 PROCEDURE — 2060000000 HC ICU INTERMEDIATE R&B

## 2021-11-30 PROCEDURE — 83735 ASSAY OF MAGNESIUM: CPT

## 2021-11-30 PROCEDURE — 80048 BASIC METABOLIC PNL TOTAL CA: CPT

## 2021-11-30 PROCEDURE — 2580000003 HC RX 258: Performed by: NURSE PRACTITIONER

## 2021-11-30 PROCEDURE — 36415 COLL VENOUS BLD VENIPUNCTURE: CPT

## 2021-11-30 PROCEDURE — 93005 ELECTROCARDIOGRAM TRACING: CPT | Performed by: STUDENT IN AN ORGANIZED HEALTH CARE EDUCATION/TRAINING PROGRAM

## 2021-11-30 RX ADMIN — APIXABAN 5 MG: 5 TABLET, FILM COATED ORAL at 21:40

## 2021-11-30 RX ADMIN — Medication 10 ML: at 09:04

## 2021-11-30 RX ADMIN — APIXABAN 5 MG: 5 TABLET, FILM COATED ORAL at 09:04

## 2021-11-30 RX ADMIN — METOPROLOL SUCCINATE 25 MG: 25 TABLET, EXTENDED RELEASE ORAL at 09:03

## 2021-11-30 RX ADMIN — DOFETILIDE 500 MCG: 0.5 CAPSULE ORAL at 20:55

## 2021-11-30 RX ADMIN — METOPROLOL SUCCINATE 25 MG: 25 TABLET, EXTENDED RELEASE ORAL at 21:40

## 2021-11-30 RX ADMIN — Medication 10 ML: at 21:40

## 2021-11-30 RX ADMIN — PANTOPRAZOLE SODIUM 40 MG: 40 TABLET, DELAYED RELEASE ORAL at 05:40

## 2021-11-30 RX ADMIN — ACETAMINOPHEN 650 MG: 325 TABLET ORAL at 21:58

## 2021-11-30 RX ADMIN — Medication 1 TABLET: at 09:03

## 2021-11-30 RX ADMIN — AMLODIPINE BESYLATE 5 MG: 5 TABLET ORAL at 09:04

## 2021-11-30 RX ADMIN — CLOPIDOGREL BISULFATE 75 MG: 75 TABLET ORAL at 09:04

## 2021-11-30 RX ADMIN — ROSUVASTATIN 10 MG: 10 TABLET, FILM COATED ORAL at 21:40

## 2021-11-30 RX ADMIN — DOFETILIDE 500 MCG: 0.5 CAPSULE ORAL at 09:04

## 2021-11-30 ASSESSMENT — PAIN SCALES - GENERAL
PAINLEVEL_OUTOF10: 0
PAINLEVEL_OUTOF10: 0

## 2021-11-30 NOTE — PLAN OF CARE
Problem:  Activity:  Goal: Ability to tolerate increased activity will improve  Description: Ability to tolerate increased activity will improve  11/30/2021 0402 by Solomon Stout RN  Outcome: Met This Shift     Problem: Cardiac:  Goal: Ability to maintain an adequate cardiac output will improve  Description: Ability to maintain an adequate cardiac output will improve  11/30/2021 0402 by Solomon Stout RN  Outcome: Met This Shift     Problem: Coping:  Goal: General experience of comfort will improve  Description: General experience of comfort will improve  11/30/2021 0402 by Solomon Stout RN  Outcome: Met This Shift     Problem: Safety:  Goal: Will show no signs and symptoms of excessive bleeding  Description: Will show no signs and symptoms of excessive bleeding  11/30/2021 0402 by Solomon Stout RN  Outcome: Met This Shift

## 2021-11-30 NOTE — CARE COORDINATION
Care Coordination  The patient was admitted due to afib and the plan is to to start the patient on Tikosyn therapy 500 mcg po bid and I spoke to the patient and he lives in a 1 story home with his wife with 1-2 steps. He has no dme. His pharmacy is [facility] and his PCP is Dr Sagar Martinez. The patient was independent pta and his ride home is his wife .  I will follow

## 2021-11-30 NOTE — PROGRESS NOTES
[address] [facility]  CARDIAC ELECTROPHYSIOLOGY DEPARTMENT/DIVISION OF CARDIOLOGY  Inpatient Progress Note   PATIENT: Saad Smith  MEDICAL RECORD NUMBER: [id number]  DATE OF SERVICE:  11/30/2021  ATTENDING ELECTROPHYSIOLOGIST:  Oanh Macias DO  REFERRING PHYSICIAN: No ref. provider found and Liudmila Cohen DO  CHIEF COMPLAINT: Atrial Fibrillation    S: Saad Smith is a 67 y.o. 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 He is managed by Beatrice Daniel and FAITH AMEZCUA with norvasc 5 mg daily, plavix 75 mg daily, apixaban 5 mg BID, metoprolol XL 25 mg BID, and crestor 10 mg daily. In 9/2020, he was diagnosed with 2* type II AV block and treated with BSCI dc PPM. In 10/2020, he was diagnosed with paroxysms of AF. In 10/2021, he was noted to be in persistent AF sicne 6/2021. He was treated with DCCV, which restored SR. He returned to AF in 11/2021. Dr FAITH AMEZCUA recommended tikosyn load. He presented, 11/29/21; for initiation of tikosyn baseline QTc 477ms. The QTc post the dose of the first dose of 500mcg was 520 ms an increase of less than 10%. Today he has been maintaining sinus and continues to tolerate Tikosyn without QTc prolongation and without cardiac complaints. Prior cardiac testing:  · ECG post 2nd dose (11/30/21) AV paced rate 74 bpm QRS 166ms QTc 515  · ECG post 1st dose (11/30/21): AV paced rate 77 bpm , QRS 174ms, QTc 520ms   · ECG Pre Tikosyn  (11/29/21) AV paced rate 70 bpm, QRS 166ms QTc 477  · ECG (10/1/21): A&V paced, QTc = 516 msec. · TTE (8/3/21): LVEF = 55%, mild KRISTI, mild MR, mild-moderate TR. · Pharm Nuc Stress (8/3/21): LVEF = 55%, normal perfusion except for attenuation artifact. · ECG (7/1/21): AF with , QTc = 479 msec.   · Event monitor (7/1/20): SR, episodes of 2* type I and type II AV block, 1 episode of NSVT (5 bats), and no AF or SVT. · LHC (10/3/17): LVEF = 60-65%, 30% proximal RCA stenosis, 80-85% mid LAD stenosis treated with DIVINA x 1.     Past Medical History:   Diagnosis Date    BPH (benign prostatic hyperplasia)     Bronchitis, acute 03/2013    CAD (coronary artery disease)     Cancer (HCC)     squamous cell cancer of skin    Heart block AV second degree     mobitz    Hyperlipidemia     Hypertension     Mobitz I     Mobitz II 09/2020    Tinnitus      Past Surgical History:   Procedure Laterality Date    CARDIOVERSION  10/01/2021    Succesful cardioversion A-fib to SR ([place])    COLONOSCOPY      CORONARY ANGIOPLASTY WITH STENT PLACEMENT  10/03/2017    Dr Ciara Davidson LAD Mid [place] 3x18mm    HAND SURGERY Right     Carpal tunnel & release trigger finger    HERNIA REPAIR      x2   [facility] HIP SURGERY Left     June 2, 2021   [facility] HIP SURGERY Right     May 2020    JOINT REPLACEMENT      KNEE ARTHROSCOPY  1988    PACEMAKER INSERTION  09/23/2020    DUAL PPM     (BSCI)    DR. Amira Mccain    SKIN BIOPSY      SKIN CANCER EXCISION  01/2020    left cheek/right bottom eyelid    TONSILLECTOMY AND ADENOIDECTOMY Bilateral     TOTAL HIP ARTHROPLASTY Right 05/13/2020    Ciarra Leach    TOTAL HIP ARTHROPLASTY Left 06/02/2021    TOTAL KNEE ARTHROPLASTY Left 05/13/2019    Dom Ciarra Leach    TOTAL KNEE ARTHROPLASTY Right 08/12/2019    Shruthi Berliner VASECTOMY        Family History   Problem Relation Age of Onset    Cancer Mother         Colon     Age 80    Cancer Father         Back [de-identified] Age 68 tumor in the back    No Known Problems Sister     No Known Problems Brother     No Known Problems Other     No Known Problems Child     No Known Problems Child     No Known Problems Child     Cancer Brother         Colon & prostate    No Known Problems Brother     Cancer Brother         Melanoma  age 39     There is no family history of sudden cardiac arrest    Social History     Tobacco Use    Smoking status: Former Smoker     Packs/day: 1.00     Years: 14.00     Pack years: 14.00     Types: Cigars     Quit date:      Years since quittin.9    Smokeless tobacco: Never Used    Tobacco comment: occasional   Substance Use Topics    Alcohol use: Yes     Comment: occasionally       Current Facility-Administered Medications   Medication Dose Route Frequency Provider Last Rate Last Admin    sodium chloride flush 0.9 % injection 10 mL  10 mL IntraVENous 2 times per day Casandra Iha, APRN - CNP   10 mL at 21 0904    sodium chloride flush 0.9 % injection 10 mL  10 mL IntraVENous PRN Casandra Iha, APRN - CNP        0.9 % sodium chloride infusion  25 mL IntraVENous PRN Casandra Iha, APRN - CNP        magnesium hydroxide (MILK OF MAGNESIA) 400 MG/5ML suspension 30 mL  30 mL Oral Daily PRN Casandra Iha, APRN - CNP        acetaminophen (TYLENOL) tablet 650 mg  650 mg Oral Q6H PRN Casandra Iha, APRN - CNP        Or    acetaminophen (TYLENOL) suppository 650 mg  650 mg Rectal Q6H PRN Casandra Iha, APRN - CNP        amLODIPine (NORVASC) tablet 5 mg  5 mg Oral Daily Casandra Iha, APRN - CNP   5 mg at 21 0272    clopidogrel (PLAVIX) tablet 75 mg  75 mg Oral Daily Casandra Iha, APRN - CNP   75 mg at 21 3133    apixaban (ELIQUIS) tablet 5 mg  5 mg Oral BID Casandra Iha, APRN - CNP   5 mg at 21 5014    metoprolol succinate (TOPROL XL) extended release tablet 25 mg  25 mg Oral BID Casandra Iha, APRN - CNP   25 mg at 21 0136    multivitamin 1 tablet  1 tablet Oral Daily Casandra Iha, APRN - CNP   1 tablet at 21 9918    rosuvastatin (CRESTOR) tablet 10 mg  10 mg Oral Nightly Casandra Iha, APRN - CNP   10 mg at 21 2103    dofetilide (TIKOSYN) capsule 500 mcg  500 mcg Oral 2 times per day Sarthak Stalling, DO   500 mcg at 21 0904    pantoprazole (PROTONIX) tablet 40 mg  40 mg Oral QAM AC Milton Gracia, DO   40 mg at 11/30/21 0540        No Known Allergies    ROS:   Constitutional: Negative for fever, activity change and appetite change. HENT: Negative for epistaxis. Eyes: Negative for diploplia, blurred vision. Respiratory: Negative for cough, chest tightness, shortness of breath and wheezing. Cardiovascular: pertinent positives in HPI  Gastrointestinal: Negative for abdominal pain and blood in stool. Genitourinary: Negative for hematuria and difficulty urinating. Musculoskeletal: Negative for myalgias and gait problem. Skin: Negative for color change and rash. Neurological: Negative for syncope and light-headedness. Psychiatric/Behavioral: Negative for confusion and agitation. The patient is not nervous/anxious. Heme: no bleeding disorders, no melena or hematochezia  All other review of systems are negative     PHYSICAL EXAM:  VS: pending  Limited exam due to COVID-19 pandemic. Constitutional: NAD  Head: Normocephalic and atraumatic. Neck: supple and no JVD present  Cardiovascular: RRR  Pulmonary/Chest:  No respiratory distress, no audible wheeze  Abdominal: nondistended   Musculoskeletal: Normal range of motion of all extremities  Neurological: A&O x 3, grossly intact   Skin: Skin is warm and dry, CIED incision C/D/I without erythema, edema, or drainage  Extremity: no edema   Psychiatric: Normal mood and affect, A&O x3     Labs: K 4.6 Mg 2.2     EKG (11/29/21): sinus with , QTc = 477 msec.     Device Interrogation/Reprogramming (11/30/21)  · Make/Model: BSCI [place]   · DOI: 09/23/2020  · Battery: 10.5 years   · Bevelyn Large therapy: DDDR  ppm  · Pacing %: RA = 6%, RV  = 98%  · Lead function:  · RA lead: sensing = 7.4 mV, impedance = 754 ohms, threshold = 0.8 V @ 0.4 msec  · RV lead: sensing = 13.9 mV, impedance = 627 ohms , threshold = 0.7 V @ 0.4 msec  · Lead programming:  · RA lead: sensitivity = 0.5 mV, output = 2.5 V @ 0.4 msec  · RV lead: sensitivity = 2.5 mV, output =2.5 V @ 0.4 msec  · Arrhythmias: recurrent atrial fibrillation with rates up to 130 bpm from Nov 2 to 12  · Reprogramming included: none   · Overall device function is normal  · All device programmable settings were evaluated per above and in the scanned document, along with iterative adjustments (capture thresholds) to assess and select the most appropriate final programming to provide for consistent delivery of the appropriate therapy and to verify function of the device. Assessment/Plan:  1. Nonvalvular persistent AF sp DCCV (10/1/21)  -Initially diagnosed 10/2020.  -CHADSVASC = (age, CAD). Continue apixaban 5 mg BID. Consider discontinuing antiplatelet as last PCI > 1 year and not at high thrombotic risk. In the interim, recommend pantoprazole 40 mg daily to reduce GI bleeding risk.  -In 10/2021, he was noted to be in persistent AF sicne 6/2021. He was treated with DCCV, which restored SR. He returned to AF in 11/2021. Dr Choco Ziegler recommended tikosyn load. He presents today, 11/29/21; for initiation of tikosyn. However, patient's baseline ECG with QTc of 477 msec with ventricular paced. CrCl > 60.  -Continue  tikosyn 500 mcg every 12 hours with continuous telemetry monitoring and ECG 2-3 hours after each tikosyn dose. -Anticipate discharge morning-afternoon 12/2/21.    2. 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  -Stable device function historically. 3. CAD sp DIVINA x 1 mid LAD (10/4/17)  -Management per Dr Alex Hill.  -See #1 regarding antiplatelet therapy. I have spent a total of 60 minutes with the patient and his/her family reviewing the above stated recommendations. And a total of >50% of that time involved face-to-face time providing counseling and or coordination of care with the other providers. Thank you for allowing me to participate in your patient's care. Please call me if there are any questions.       Discussed with Gregory Cook Namita Harmon.   Electronically signed by MOSES Riggins CNP on 11/30/2021 at 5:48 PM  Cardiac Electrophysiology  [address] [place] [address] Physicians    Attending Supervising Physicians Attestation Statement  I was present with the nurse practitioner during the history and exam. I discussed the findings and plans with the nurse practitioner and agree as documented in his note     Electronically signed by Sheela Walsh DO on 11/30/21 at 10:23 PM EST

## 2021-12-01 ENCOUNTER — TELEPHONE (OUTPATIENT)
Dept: NON INVASIVE DIAGNOSTICS | Age: 76
End: 2021-12-01

## 2021-12-01 LAB
ANION GAP SERPL CALCULATED.3IONS-SCNC: 11 MMOL/L (ref 7–16)
BUN BLDV-MCNC: 21 MG/DL (ref 6–23)
CALCIUM SERPL-MCNC: 9.5 MG/DL (ref 8.6–10.2)
CHLORIDE BLD-SCNC: 104 MMOL/L (ref 98–107)
CO2: 26 MMOL/L (ref 22–29)
CREAT SERPL-MCNC: 1 MG/DL (ref 0.7–1.2)
EKG ATRIAL RATE: 74 BPM
EKG ATRIAL RATE: 76 BPM
EKG P AXIS: 44 DEGREES
EKG P-R INTERVAL: 188 MS
EKG P-R INTERVAL: 188 MS
EKG Q-T INTERVAL: 468 MS
EKG Q-T INTERVAL: 490 MS
EKG QRS DURATION: 166 MS
EKG QRS DURATION: 172 MS
EKG QTC CALCULATION (BAZETT): 519 MS
EKG QTC CALCULATION (BAZETT): 551 MS
EKG R AXIS: -75 DEGREES
EKG R AXIS: -75 DEGREES
EKG T AXIS: 75 DEGREES
EKG T AXIS: 80 DEGREES
EKG VENTRICULAR RATE: 74 BPM
EKG VENTRICULAR RATE: 76 BPM
GFR AFRICAN AMERICAN: >60
GFR NON-AFRICAN AMERICAN: >60 ML/MIN/1.73
GLUCOSE BLD-MCNC: 92 MG/DL (ref 74–99)
MAGNESIUM: 2.3 MG/DL (ref 1.6–2.6)
POTASSIUM SERPL-SCNC: 4.7 MMOL/L (ref 3.5–5)
SODIUM BLD-SCNC: 141 MMOL/L (ref 132–146)

## 2021-12-01 PROCEDURE — 6370000000 HC RX 637 (ALT 250 FOR IP): Performed by: STUDENT IN AN ORGANIZED HEALTH CARE EDUCATION/TRAINING PROGRAM

## 2021-12-01 PROCEDURE — 6370000000 HC RX 637 (ALT 250 FOR IP): Performed by: NURSE PRACTITIONER

## 2021-12-01 PROCEDURE — 2580000003 HC RX 258: Performed by: NURSE PRACTITIONER

## 2021-12-01 PROCEDURE — 99233 SBSQ HOSP IP/OBS HIGH 50: CPT | Performed by: STUDENT IN AN ORGANIZED HEALTH CARE EDUCATION/TRAINING PROGRAM

## 2021-12-01 PROCEDURE — 93010 ELECTROCARDIOGRAM REPORT: CPT | Performed by: INTERNAL MEDICINE

## 2021-12-01 PROCEDURE — 93005 ELECTROCARDIOGRAM TRACING: CPT | Performed by: STUDENT IN AN ORGANIZED HEALTH CARE EDUCATION/TRAINING PROGRAM

## 2021-12-01 PROCEDURE — 80048 BASIC METABOLIC PNL TOTAL CA: CPT

## 2021-12-01 PROCEDURE — 36415 COLL VENOUS BLD VENIPUNCTURE: CPT

## 2021-12-01 PROCEDURE — 2060000000 HC ICU INTERMEDIATE R&B

## 2021-12-01 PROCEDURE — 83735 ASSAY OF MAGNESIUM: CPT

## 2021-12-01 RX ADMIN — APIXABAN 5 MG: 5 TABLET, FILM COATED ORAL at 21:58

## 2021-12-01 RX ADMIN — AMLODIPINE BESYLATE 5 MG: 5 TABLET ORAL at 10:14

## 2021-12-01 RX ADMIN — CLOPIDOGREL BISULFATE 75 MG: 75 TABLET ORAL at 10:14

## 2021-12-01 RX ADMIN — DOFETILIDE 500 MCG: 0.5 CAPSULE ORAL at 21:35

## 2021-12-01 RX ADMIN — Medication 1 TABLET: at 12:08

## 2021-12-01 RX ADMIN — APIXABAN 5 MG: 5 TABLET, FILM COATED ORAL at 10:14

## 2021-12-01 RX ADMIN — Medication 10 ML: at 21:58

## 2021-12-01 RX ADMIN — PANTOPRAZOLE SODIUM 40 MG: 40 TABLET, DELAYED RELEASE ORAL at 06:34

## 2021-12-01 RX ADMIN — Medication 10 ML: at 12:21

## 2021-12-01 RX ADMIN — ROSUVASTATIN 10 MG: 10 TABLET, FILM COATED ORAL at 21:58

## 2021-12-01 RX ADMIN — METOPROLOL SUCCINATE 25 MG: 25 TABLET, EXTENDED RELEASE ORAL at 21:58

## 2021-12-01 RX ADMIN — METOPROLOL SUCCINATE 25 MG: 25 TABLET, EXTENDED RELEASE ORAL at 10:14

## 2021-12-01 RX ADMIN — DOFETILIDE 500 MCG: 0.5 CAPSULE ORAL at 10:14

## 2021-12-01 ASSESSMENT — PAIN SCALES - GENERAL
PAINLEVEL_OUTOF10: 0
PAINLEVEL_OUTOF10: 0

## 2021-12-01 NOTE — PLAN OF CARE
Problem:  Activity:  Goal: Ability to tolerate increased activity will improve  Description: Ability to tolerate increased activity will improve  Outcome: Met This Shift  Goal: Expression of feelings of increased energy will increase  Description: Expression of feelings of increased energy will increase  Outcome: Met This Shift

## 2021-12-01 NOTE — PROGRESS NOTES
[address] [facility]  CARDIAC ELECTROPHYSIOLOGY DEPARTMENT/DIVISION OF CARDIOLOGY  Inpatient Progress Note   PATIENT: Luh Sunshine  MEDICAL RECORD NUMBER: [id number]  DATE OF SERVICE:  12/1/2021  ATTENDING ELECTROPHYSIOLOGIST:  Nazario Shah DO  REFERRING PHYSICIAN: No ref. provider found and Priscila Ulloa DO  CHIEF COMPLAINT: Atrial Fibrillation    S: Luh Sunshine is a 67 y.o. 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 He is managed by Beatrice Quinones and FAITH AMEZCUA with norvasc 5 mg daily, plavix 75 mg daily, apixaban 5 mg BID, metoprolol XL 25 mg BID, and crestor 10 mg daily. In 9/2020, he was diagnosed with 2* type II AV block and treated with BSCI dc PPM. In 10/2020, he was diagnosed with paroxysms of AF. In 10/2021, he was noted to be in persistent AF sicne 6/2021. He was treated with DCCV, which restored SR. He returned to AF in 11/2021. Dr FAITH AMEZCUA recommended tikosyn load. He presented, 11/29/21; for initiation of tikosyn baseline QTc 477ms. The QTc post the dose of the first dose of 500mcg was 520 ms an increase of less than 10%. His QTc has remained stable on the 500mcg dose and he has no complaints at this time. Prior cardiac testing:  · ECG post 3rd dose (11/30/21):  AV paced rate 76 bpm, QRS 172ms QTc 517 ms   · ECG post 2nd dose (11/30/21) AV paced rate 74 bpm QRS 166ms QTc 515  · ECG post 1st dose (11/30/21): AV paced rate 77 bpm , QRS 174ms, QTc 520ms   · ECG Pre Tikosyn  (11/29/21) AV paced rate 70 bpm, QRS 166ms QTc 477  · ECG (10/1/21): A&V paced, QTc = 516 msec. · TTE (8/3/21): LVEF = 55%, mild KRISTI, mild MR, mild-moderate TR. · Pharm Nuc Stress (8/3/21): LVEF = 55%, normal perfusion except for attenuation artifact. · ECG (7/1/21): AF with , QTc = 479 msec.   · Event monitor (7/1/20): SR, episodes of 2* type I and type II AV block, 1 episode of NSVT (5 bats), and no AF or SVT. · LHC (10/3/17): LVEF = 60-65%, 30% proximal RCA stenosis, 80-85% mid LAD stenosis treated with DIVINA x 1.     Past Medical History:   Diagnosis Date    BPH (benign prostatic hyperplasia)     Bronchitis, acute 03/2013    CAD (coronary artery disease)     Cancer (HCC)     squamous cell cancer of skin    Heart block AV second degree     mobitz    Hyperlipidemia     Hypertension     Mobitz I     Mobitz II 09/2020    Tinnitus      Past Surgical History:   Procedure Laterality Date    CARDIOVERSION  10/01/2021    Succesful cardioversion A-fib to SR ([place])    COLONOSCOPY      CORONARY ANGIOPLASTY WITH STENT PLACEMENT  10/03/2017    Dr Isabela Robbins LAD Mid Alpine 3x18mm    HAND SURGERY Right     Carpal tunnel & release trigger finger    HERNIA REPAIR      x2   Black HIP SURGERY Left     June 2, 2021   Black HIP SURGERY Right     May 2020    JOINT REPLACEMENT      KNEE ARTHROSCOPY  1988    PACEMAKER INSERTION  09/23/2020    DUAL PPM     (BSCI)    DR. Rabia Arteaga    SKIN BIOPSY      SKIN CANCER EXCISION  01/2020    left cheek/right bottom eyelid    TONSILLECTOMY AND ADENOIDECTOMY Bilateral     TOTAL HIP ARTHROPLASTY Right 05/13/2020    Susan Moulds    TOTAL HIP ARTHROPLASTY Left 06/02/2021    TOTAL KNEE ARTHROPLASTY Left 05/13/2019    Dom Susan Moulds    TOTAL KNEE ARTHROPLASTY Right 08/12/2019    Yvetta Cipro VASECTOMY        Family History   Problem Relation Age of Onset    Cancer Mother         Colon     Age 80    Cancer Father         Back [de-identified] Age 68 tumor in the back    No Known Problems Sister     No Known Problems Brother     No Known Problems Other     No Known Problems Child     No Known Problems Child     No Known Problems Child     Cancer Brother         Colon & prostate    No Known Problems Brother     Cancer Brother         Melanoma  age 39     There is no family history of sudden cardiac arrest    Social History Tobacco Use    Smoking status: Former Smoker     Packs/day: 1.00     Years: 14.00     Pack years: 14.00     Types: Cigars     Quit date:      Years since quittin.9    Smokeless tobacco: Never Used    Tobacco comment: occasional   Substance Use Topics    Alcohol use: Yes     Comment: occasionally       Current Facility-Administered Medications   Medication Dose Route Frequency Provider Last Rate Last Admin    sodium chloride flush 0.9 % injection 10 mL  10 mL IntraVENous 2 times per day MOSES Young CNP   10 mL at 21    sodium chloride flush 0.9 % injection 10 mL  10 mL IntraVENous PRN MOSES Young CNP        0.9 % sodium chloride infusion  25 mL IntraVENous PRN MOSES Young CNP        magnesium hydroxide (MILK OF MAGNESIA) 400 MG/5ML suspension 30 mL  30 mL Oral Daily PRN MOSES Young CNP        acetaminophen (TYLENOL) tablet 650 mg  650 mg Oral Q6H PRN MOSES Young CNP   650 mg at 21    Or    acetaminophen (TYLENOL) suppository 650 mg  650 mg Rectal Q6H PRN MOSES Young CNP        amLODIPine (NORVASC) tablet 5 mg  5 mg Oral Daily MOSES Young CNP   5 mg at 21 1014    clopidogrel (PLAVIX) tablet 75 mg  75 mg Oral Daily MOSES Young CNP   75 mg at 21 1014    apixaban (ELIQUIS) tablet 5 mg  5 mg Oral BID MOSES Young CNP   5 mg at 21 1014    metoprolol succinate (TOPROL XL) extended release tablet 25 mg  25 mg Oral BID MOSES Young CNP   25 mg at 21 1014    multivitamin 1 tablet  1 tablet Oral Daily MOSES Young CNP   1 tablet at 21 8633    rosuvastatin (CRESTOR) tablet 10 mg  10 mg Oral Nightly MOSES Young CNP   10 mg at 21    dofetilide (TIKOSYN) capsule 500 mcg  500 mcg Oral 2 times per day Clair Burgos DO   500 mcg at 21 1014    pantoprazole (PROTONIX) tablet 40 mg  40 mg Oral QAM JERZY Cox DO   40 mg at 12/01/21 5947        No Known Allergies    ROS:   Constitutional: Negative for fever, activity change and appetite change. HENT: Negative for epistaxis. Eyes: Negative for diploplia, blurred vision. Respiratory: Negative for cough, chest tightness, shortness of breath and wheezing. Cardiovascular: pertinent positives in HPI  Gastrointestinal: Negative for abdominal pain and blood in stool. Genitourinary: Negative for hematuria and difficulty urinating. Musculoskeletal: Negative for myalgias and gait problem. Skin: Negative for color change and rash. Neurological: Negative for syncope and light-headedness. Psychiatric/Behavioral: Negative for confusion and agitation. The patient is not nervous/anxious. Heme: no bleeding disorders, no melena or hematochezia  All other review of systems are negative     PHYSICAL EXAM:  Patient Vitals for the past 24 hrs:   BP Temp Temp src Pulse Resp SpO2   12/01/21 0815 117/76 96.8 °F (36 °C) Temporal 73 18 96 %   11/30/21 2130 139/82 96.8 °F (36 °C) Temporal 68 18 95 %   11/30/21 1512 (!) 119/59 97.6 °F (36.4 °C) Temporal 70 18 96 %     Limited exam due to COVID-19 pandemic. Constitutional: NAD Wife at bedside. Head: Normocephalic and atraumatic. Neck: supple and no JVD present  Cardiovascular: RRR  Pulmonary/Chest:  No respiratory distress, no audible wheeze  Abdominal: nondistended   Musculoskeletal: Normal range of motion of all extremities  Neurological: A&O x 3, grossly intact   Skin: Skin is warm and dry, CIED incision C/D/I without erythema, edema, or drainage  Extremity: no edema   Psychiatric: Normal mood and affect, A&O x3     Labs: K 4.7 Mg 2.3     Telemetry: AP/AS- rates 70-90 bpm with PACs. EKG (11/29/21): sinus with , QTc = 477 msec.     Device Interrogation/Reprogramming (11/30/21)  · Make/Model: BSCI Accolarde   · DOI: 09/23/2020  · Battery: 10.5 years   · Merlin Katrina therapy: DDDR  ppm  · Pacing %: RA = 6%, RV  = 98%  · Lead function:  · RA lead: sensing = 7.4 mV, impedance = 754 ohms, threshold = 0.8 V @ 0.4 msec  · RV lead: sensing = 13.9 mV, impedance = 627 ohms , threshold = 0.7 V @ 0.4 msec  · Lead programming:  · RA lead: sensitivity = 0.5 mV, output = 2.5 V @ 0.4 msec  · RV lead: sensitivity = 2.5 mV, output =2.5 V @ 0.4 msec  · Arrhythmias: recurrent atrial fibrillation with rates up to 130 bpm from Nov 2 to 12  · Reprogramming included: none   · Overall device function is normal  · All device programmable settings were evaluated per above and in the scanned document, along with iterative adjustments (capture thresholds) to assess and select the most appropriate final programming to provide for consistent delivery of the appropriate therapy and to verify function of the device. Assessment/Plan:  1. Nonvalvular persistent AF sp DCCV (10/1/21)  -Initially diagnosed 10/2020.  -CHADSVASC = (age, CAD). Continue apixaban 5 mg BID. Consider discontinuing antiplatelet as last PCI > 1 year and not at high thrombotic risk. In the interim, recommend pantoprazole 40 mg daily to reduce GI bleeding risk.  -In 10/2021, he was noted to be in persistent AF sicne 6/2021. He was treated with DCCV, which restored SR. He returned to AF in 11/2021. Dr Zena Underwood recommended tikosyn load. He presents today, 11/29/21; for initiation of tikosyn. However, patient's baseline ECG with QTc of 477 msec with ventricular paced. CrCl > 60.  -Continue  tikosyn 500 mcg every 12 hours with continuous telemetry monitoring and ECG 2-3 hours after each tikosyn dose. -Anticipate discharge morning-afternoon 12/2/21 with an EKG on Monday. 2. 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  -Stable device function historically.     3. CAD sp DIVINA x 1 mid LAD (10/4/17)  -Management per Dr Anabelle Rojas.  -See #1 regarding

## 2021-12-01 NOTE — TELEPHONE ENCOUNTER
----- Message from MOSES Jones CNP sent at 12/1/2021 11:31 AM EST -----  Yasmin Oropeza was seen inpatient and started on tikosyn he will need an EKG next Monday and then in one month when he is in [place] with follow-up once they return to [place] with Dr. Jonathan Bhat. Thanks.

## 2021-12-02 VITALS
SYSTOLIC BLOOD PRESSURE: 127 MMHG | TEMPERATURE: 98.1 F | BODY MASS INDEX: 29.16 KG/M2 | RESPIRATION RATE: 18 BRPM | OXYGEN SATURATION: 98 % | HEIGHT: 73 IN | HEART RATE: 80 BPM | WEIGHT: 220 LBS | DIASTOLIC BLOOD PRESSURE: 79 MMHG

## 2021-12-02 LAB
ANION GAP SERPL CALCULATED.3IONS-SCNC: 8 MMOL/L (ref 7–16)
BUN BLDV-MCNC: 20 MG/DL (ref 6–23)
CALCIUM SERPL-MCNC: 9.2 MG/DL (ref 8.6–10.2)
CHLORIDE BLD-SCNC: 102 MMOL/L (ref 98–107)
CO2: 27 MMOL/L (ref 22–29)
CREAT SERPL-MCNC: 1 MG/DL (ref 0.7–1.2)
EKG ATRIAL RATE: 70 BPM
EKG ATRIAL RATE: 72 BPM
EKG ATRIAL RATE: 76 BPM
EKG ATRIAL RATE: 77 BPM
EKG P AXIS: 28 DEGREES
EKG P AXIS: 57 DEGREES
EKG P AXIS: 60 DEGREES
EKG P AXIS: 76 DEGREES
EKG P-R INTERVAL: 186 MS
EKG P-R INTERVAL: 186 MS
EKG P-R INTERVAL: 188 MS
EKG P-R INTERVAL: 188 MS
EKG Q-T INTERVAL: 464 MS
EKG Q-T INTERVAL: 474 MS
EKG Q-T INTERVAL: 482 MS
EKG Q-T INTERVAL: 482 MS
EKG QRS DURATION: 164 MS
EKG QRS DURATION: 164 MS
EKG QRS DURATION: 174 MS
EKG QRS DURATION: 176 MS
EKG QTC CALCULATION (BAZETT): 519 MS
EKG QTC CALCULATION (BAZETT): 520 MS
EKG QTC CALCULATION (BAZETT): 522 MS
EKG QTC CALCULATION (BAZETT): 545 MS
EKG R AXIS: -73 DEGREES
EKG R AXIS: -74 DEGREES
EKG R AXIS: -75 DEGREES
EKG R AXIS: -75 DEGREES
EKG T AXIS: 77 DEGREES
EKG T AXIS: 77 DEGREES
EKG T AXIS: 80 DEGREES
EKG T AXIS: 81 DEGREES
EKG VENTRICULAR RATE: 70 BPM
EKG VENTRICULAR RATE: 72 BPM
EKG VENTRICULAR RATE: 76 BPM
EKG VENTRICULAR RATE: 77 BPM
GFR AFRICAN AMERICAN: >60
GFR NON-AFRICAN AMERICAN: >60 ML/MIN/1.73
GLUCOSE BLD-MCNC: 94 MG/DL (ref 74–99)
MAGNESIUM: 2.2 MG/DL (ref 1.6–2.6)
POTASSIUM SERPL-SCNC: 4.7 MMOL/L (ref 3.5–5)
SODIUM BLD-SCNC: 137 MMOL/L (ref 132–146)

## 2021-12-02 PROCEDURE — 93005 ELECTROCARDIOGRAM TRACING: CPT | Performed by: STUDENT IN AN ORGANIZED HEALTH CARE EDUCATION/TRAINING PROGRAM

## 2021-12-02 PROCEDURE — 80048 BASIC METABOLIC PNL TOTAL CA: CPT

## 2021-12-02 PROCEDURE — 93010 ELECTROCARDIOGRAM REPORT: CPT | Performed by: INTERNAL MEDICINE

## 2021-12-02 PROCEDURE — 2580000003 HC RX 258: Performed by: NURSE PRACTITIONER

## 2021-12-02 PROCEDURE — 6370000000 HC RX 637 (ALT 250 FOR IP): Performed by: STUDENT IN AN ORGANIZED HEALTH CARE EDUCATION/TRAINING PROGRAM

## 2021-12-02 PROCEDURE — 83735 ASSAY OF MAGNESIUM: CPT

## 2021-12-02 PROCEDURE — 6370000000 HC RX 637 (ALT 250 FOR IP): Performed by: NURSE PRACTITIONER

## 2021-12-02 PROCEDURE — 99233 SBSQ HOSP IP/OBS HIGH 50: CPT | Performed by: STUDENT IN AN ORGANIZED HEALTH CARE EDUCATION/TRAINING PROGRAM

## 2021-12-02 PROCEDURE — 36415 COLL VENOUS BLD VENIPUNCTURE: CPT

## 2021-12-02 RX ORDER — PANTOPRAZOLE SODIUM 40 MG/1
40 TABLET, DELAYED RELEASE ORAL
Qty: 30 TABLET | Refills: 3 | Status: SHIPPED | OUTPATIENT
Start: 2021-12-03 | End: 2022-06-30

## 2021-12-02 RX ORDER — DOFETILIDE 0.5 MG/1
500 CAPSULE ORAL EVERY 12 HOURS SCHEDULED
Qty: 60 CAPSULE | Refills: 3 | Status: SHIPPED | OUTPATIENT
Start: 2021-12-02 | End: 2021-12-13 | Stop reason: SDUPTHER

## 2021-12-02 RX ORDER — DIPHENHYDRAMINE HYDROCHLORIDE, ZINC ACETATE 2; .1 G/100G; G/100G
CREAM TOPICAL 3 TIMES DAILY PRN
Status: DISCONTINUED | OUTPATIENT
Start: 2021-12-02 | End: 2021-12-02 | Stop reason: HOSPADM

## 2021-12-02 RX ADMIN — DOFETILIDE 500 MCG: 0.5 CAPSULE ORAL at 09:08

## 2021-12-02 RX ADMIN — Medication 1 TABLET: at 09:09

## 2021-12-02 RX ADMIN — Medication 10 ML: at 09:34

## 2021-12-02 RX ADMIN — CLOPIDOGREL BISULFATE 75 MG: 75 TABLET ORAL at 09:08

## 2021-12-02 RX ADMIN — APIXABAN 5 MG: 5 TABLET, FILM COATED ORAL at 09:08

## 2021-12-02 RX ADMIN — AMLODIPINE BESYLATE 5 MG: 5 TABLET ORAL at 09:08

## 2021-12-02 RX ADMIN — PANTOPRAZOLE SODIUM 40 MG: 40 TABLET, DELAYED RELEASE ORAL at 05:50

## 2021-12-02 RX ADMIN — METOPROLOL SUCCINATE 25 MG: 25 TABLET, EXTENDED RELEASE ORAL at 09:08

## 2021-12-02 ASSESSMENT — PAIN SCALES - GENERAL: PAINLEVEL_OUTOF10: 0

## 2021-12-02 NOTE — DISCHARGE SUMMARY
[address]  [place] and [address] [facility] Electrophysiology  Discharge Summary  Patient: Ethel Saldana  Medical Record Number: [id number]  Date of Procedure:  12/2/2021  Attending  Electrophysiologist: Cora Allen DO   Primary Electrophysiologist: Giovanni Leventhal, MD  Referring Physician: Shimon Agrawal DO     Admission Date:11/29/2021      Discharge Date:   12/02/21    Patient Active Problem List   Diagnosis    Hyperlipidemia    Hand pain, right    Bronchitis, acute    Headache    Tinnitus    CAD (coronary artery disease)    Mobitz (type) I (Wenckebach's) atrioventricular block    Coronary artery disease due to lipid rich plaque    Chest pain    Second degree AV block, Mobitz type II    Pacemaker    PAF (paroxysmal atrial fibrillation) ([facility]    Persistent atrial fibrillation ([facility]    Atrial fibrillation [facility])     Discharge medications     There are NEW medications for you.  START taking them after you leave the hospital     Next Dose Due AM NOON PM NIGHT   Icon medications to start taking   dofetilide 500 MCG capsule  Commonly known as: TIKOSYN  Take 1 capsule by mouth every 12 hours        Icon medications to start taking   pantoprazole 40 MG tablet  Commonly known as: PROTONIX  Take 1 tablet by mouth every morning (before breakfast)  Start taking on: December 3, 2021              These are medications you told us you were taking at home, CONTINUE taking them after you leave the hospital     Next Dose Due AM NOON PM NIGHT    acetaminophen 500 MG tablet  Commonly known as: TYLENOL  Take 500 mg by mouth as needed for Pain         amLODIPine 5 MG tablet  Commonly known as: NORVASC  Take 1 tablet by mouth daily         Centrum Silver Adult 50+ Tabs  Take 1 tablet by mouth daily         clopidogrel 75 MG tablet  Commonly known as: PLAVIX  Take 1 tablet by mouth daily         CO Q 10 PO  Take by mouth daily         Eliquis 5 MG Tabs tablet  Generic drug: apixaban  TAKE 1 TABLET TWICE A DAY         FISH OIL EXTRA STRENGTH PO  Take 1,000 mg by mouth daily         metoprolol succinate 25 MG extended release tablet  Commonly known as: Toprol XL  Take 1 tablet by mouth 2 times daily         PROSTATE PO  Take by mouth daily         rosuvastatin 10 MG tablet  Commonly known as: CRESTOR  TAKE 1 TABLET DAILY         Vitamin D3 50 MCG (2000 UT) Caps  Take by mouth            Hospital Course: Emir Oro is a 67 y.o. 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 He is managed by Beatrice Main and Harry Pinzon with norvasc 5 mg daily, plavix 75 mg daily, apixaban 5 mg BID, metoprolol XL 25 mg BID, and crestor 10 mg daily. In 9/2020, he was diagnosed with 2* type II AV block and treated with BSCI dc PPM. In 10/2020, he was diagnosed with paroxysms of AF. In 10/2021, he was noted to be in persistent AF sicne 6/2021. He was treated with DCCV, which restored SR. He returned to AF in 11/2021. Dr Harry Pinzon recommended tikosyn load. He presented, 11/29/21; for initiation of tikosyn baseline QTc 477ms. The QTc post the dose of the first dose of 500mcg was 520 ms an increase of less than 10%. His QTc has remained stable on the 500mcg dose during the remainder of his admission. And he is being discharghed on Tikosyn 500mcg every 12 hours. Protonix was also started during this hospitalization due to ongoing Plavix use. Procedures Performed: None     Consultants: None     Disposition: The patient was discharged to home in stable condition on the above medications. Clinical follow-up in the office in 1 week for an ECG. Discussed with Dr. Brendan Tomlin.    Bonifacio Vogel, APRN - CNP  Cardiac Electrophysiology  [facility]  [facility]  1:53 PM  12/2/2021    Attending Supervising Physicians Attestation Statement  I was present with the nurse practitioner during the history and exam. I discussed the findings and plans with the nurse practitioner and agree as documented in his note     Electronically signed by Remington Peña DO on 12/2/21 at 10:28 PM EST

## 2021-12-02 NOTE — PROGRESS NOTES
[address] ELECTROPHYSIOLOGY DEPARTMENT/DIVISION OF CARDIOLOGY  Inpatient Progress Note   PATIENT: Shanel Workman  MEDICAL RECORD NUMBER: [id number]  DATE OF SERVICE:  12/2/2021  ATTENDING ELECTROPHYSIOLOGIST:  Denise Li DO  REFERRING PHYSICIAN: No ref. provider found and Miguel Doan DO  CHIEF COMPLAINT: Atrial Fibrillation    S: Shanel Workman is a 67 y.o. 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 He is managed by Beatrice Davidson and Carlitos Solorio with norvasc 5 mg daily, plavix 75 mg daily, apixaban 5 mg BID, metoprolol XL 25 mg BID, and crestor 10 mg daily. In 9/2020, he was diagnosed with 2* type II AV block and treated with BSCI dc PPM. In 10/2020, he was diagnosed with paroxysms of AF. In 10/2021, he was noted to be in persistent AF sicne 6/2021. He was treated with DCCV, which restored SR. He returned to AF in 11/2021. Dr Carlitos Solorio recommended tikosyn load. He presented, 11/29/21; for initiation of tikosyn baseline QTc 477ms. The QTc post the dose of the first dose of 500mcg was 520 ms an increase of less than 10%. His QTc has remained stable on the 500mcg dose and he has no complaints at this time. He did get small \"bites\" to his right leg overnight.      Prior cardiac testing:  ECG post 6th dose (12/02/21): AV paced rate 72 bpm, QRS 164ms, QTc 519ms   ECG post 5th dose (12/02/21): AV paced rate 76 bpm, QRS 176ms QTc 522ms   ECG post 4th dose (12/01/21): AV paced rate 70 bpm QRS 164ms QTc 520ms   ECG post 3rd dose (11/30/21):  AV paced rate 76 bpm, QRS 172ms QTc 517 ms   ECG post 2nd dose (11/30/21) AV paced rate 74 bpm QRS 166ms QTc 515  ECG post 1st dose (11/30/21): AV paced rate 77 bpm , QRS 174ms, QTc 520ms   ECG Pre Tikosyn  (11/29/21) AV paced rate 70 bpm, QRS 166ms QTc 477  ECG (10/1/21): A&V paced, QTc = 516 msec. TTE (8/3/21): LVEF = 55%, mild KRISTI, mild MR, mild-moderate TR. Pharm Nuc Stress (8/3/21): LVEF = 55%, normal perfusion except for attenuation artifact. ECG (7/1/21): AF with , QTc = 479 msec. Event monitor (7/1/20): SR, episodes of 2* type I and type II AV block, 1 episode of NSVT (5 bats), and no AF or SVT. LHC (10/3/17): LVEF = 60-65%, 30% proximal RCA stenosis, 80-85% mid LAD stenosis treated with DIVINA x 1.     Past Medical History:   Diagnosis Date    BPH (benign prostatic hyperplasia)     Bronchitis, acute 03/2013    CAD (coronary artery disease)     Cancer (HCC)     squamous cell cancer of skin    Heart block AV second degree     mobitz    Hyperlipidemia     Hypertension     Mobitz I     Mobitz II 09/2020    Tinnitus      Past Surgical History:   Procedure Laterality Date    CARDIOVERSION  10/01/2021    Succesful cardioversion A-fib to SR [facility])    COLONOSCOPY      CORONARY ANGIOPLASTY WITH STENT PLACEMENT  10/03/2017    Dr Nette Quinones LAD Mid Alpine 3x18mm    HAND SURGERY Right     Carpal tunnel & release trigger finger    HERNIA REPAIR      x2    HIP SURGERY Left     June 2, 2021    HIP SURGERY Right     May 2020    JOINT REPLACEMENT      KNEE ARTHROSCOPY  1988    PACEMAKER INSERTION  09/23/2020    DUAL PPM     (BSCI)    DR. Adrienne Quiroga    SKIN BIOPSY      SKIN CANCER EXCISION  01/2020    left cheek/right bottom eyelid    TONSILLECTOMY AND ADENOIDECTOMY Bilateral     TOTAL HIP ARTHROPLASTY Right 05/13/2020    Betsya Juan    TOTAL HIP ARTHROPLASTY Left 06/02/2021    TOTAL KNEE ARTHROPLASTY Left 05/13/2019    Dom Gemamichaeldrissa Share    TOTAL KNEE ARTHROPLASTY Right 08/12/2019    Sage Patino        Family History   Problem Relation Age of Onset    Cancer Mother         Colon     Age 80    Cancer Father         Back Ca   Age 68 tumor in the back    No Known Problems Sister     No Known Problems Brother     No Known Problems Other     No Known Problems Child     No Known Problems Child     No Known Problems Child     Cancer Brother         Colon & prostate    No Known Problems Brother     Cancer Brother         Melanoma  age 39     There is no family history of sudden cardiac arrest    Social History     Tobacco Use    Smoking status: Former Smoker     Packs/day: 1.00     Years: 14.00     Pack years: 14.00     Types: Cigars     Quit date:      Years since quittin.9    Smokeless tobacco: Never Used    Tobacco comment: occasional   Substance Use Topics    Alcohol use: Yes     Comment: occasionally       Current Facility-Administered Medications   Medication Dose Route Frequency Provider Last Rate Last Admin    diphenhydrAMINE-zinc acetate (BENADRYL) cream   Topical TID PRN Valentino Maas, APRN - CNP        sodium chloride flush 0.9 % injection 10 mL  10 mL IntraVENous 2 times per day Valentino Maas, APRN - CNP   10 mL at 21 0934    sodium chloride flush 0.9 % injection 10 mL  10 mL IntraVENous PRN Valentino Maas, APRN - CNP        0.9 % sodium chloride infusion  25 mL IntraVENous PRN Valentino Maas, APRN - CNP        magnesium hydroxide (MILK OF MAGNESIA) 400 MG/5ML suspension 30 mL  30 mL Oral Daily PRN Valentino Maas, APRN - CNP        acetaminophen (TYLENOL) tablet 650 mg  650 mg Oral Q6H PRN Valentino Maas, APRN - CNP   650 mg at 21    Or    acetaminophen (TYLENOL) suppository 650 mg  650 mg Rectal Q6H PRN Valentino Maas, APRN - CNP        amLODIPine (NORVASC) tablet 5 mg  5 mg Oral Daily Valentino Maas, APRN - CNP   5 mg at 21 0908    clopidogrel (PLAVIX) tablet 75 mg  75 mg Oral Daily Valentino Maas, APRN - CNP   75 mg at 21 0908    apixaban (ELIQUIS) tablet 5 mg  5 mg Oral BID Valentino Maas, APRN - CNP   5 mg at 21 0908    metoprolol succinate (TOPROL XL) extended release tablet 25 mg  25 mg Oral BID Valentino Maas, APRN - CNP   25 mg at 21 0908    multivitamin 1 tablet 1 tablet Oral Daily MOSES Lawler CNP   1 tablet at 12/02/21 7478    rosuvastatin (CRESTOR) tablet 10 mg  10 mg Oral Nightly MOSES Lawler CNP   10 mg at 12/01/21 2158    dofetilide (TIKOSYN) capsule 500 mcg  500 mcg Oral 2 times per day Rozelle Rouge, DO   500 mcg at 12/02/21 0908    pantoprazole (PROTONIX) tablet 40 mg  40 mg Oral QAM AC Rozelle Rouge, DO   40 mg at 12/02/21 0550        No Known Allergies    ROS:   Constitutional: Negative for fever, activity change and appetite change. HENT: Negative for epistaxis. Eyes: Negative for diploplia, blurred vision. Respiratory: Negative for cough, chest tightness, shortness of breath and wheezing. Cardiovascular: pertinent positives in HPI  Gastrointestinal: Negative for abdominal pain and blood in stool. Genitourinary: Negative for hematuria and difficulty urinating. Musculoskeletal: Negative for myalgias and gait problem. Skin: Negative for color change and rash. Neurological: Negative for syncope and light-headedness. Psychiatric/Behavioral: Negative for confusion and agitation. The patient is not nervous/anxious. Heme: no bleeding disorders, no melena or hematochezia  All other review of systems are negative     PHYSICAL EXAM:  Patient Vitals for the past 24 hrs:   BP Temp Temp src Pulse Resp SpO2   12/02/21 0900 127/79 98.1 °F (36.7 °C) Temporal 80 18 98 %   12/02/21 0545 (!) 107/59 97.5 °F (36.4 °C) Temporal 88 18 97 %   12/01/21 2145 133/66 97.5 °F (36.4 °C) Temporal 72 18 96 %   12/01/21 1600 131/75 97 °F (36.1 °C) Temporal 78 18 96 %     Limited exam due to COVID-19 pandemic. Constitutional: NAD Wife at bedside. Head: Normocephalic and atraumatic.    Neck: supple and no JVD present  Cardiovascular: RRR  Pulmonary/Chest:  No respiratory distress, no audible wheeze  Abdominal: nondistended   Musculoskeletal: Normal range of motion of all extremities  Neurological: A&O x 3, grossly intact   Skin: Skin is warm and dry, CIED incision C/D/I without erythema, edema, or drainage small bite\" marks to R leg. Extremity: no edema   Psychiatric: Normal mood and affect, A&O x3     Labs: K 4.7 Mg 2.2    Telemetry: AP/AS- rates 70-90 bpm with PACs. EKG (11/29/21): sinus with , QTc = 477 msec. Device Interrogation/Reprogramming (11/30/21)  Make/Model: BSCI Accolarde   DOI: 09/23/2020  Battery: 10.5 years   Ritchie Enedina therapy: DDDR  ppm  Pacing %: RA = 6%, RV  = 98%  Lead function:  RA lead: sensing = 7.4 mV, impedance = 754 ohms, threshold = 0.8 V @ 0.4 msec  RV lead: sensing = 13.9 mV, impedance = 627 ohms , threshold = 0.7 V @ 0.4 msec  Lead programming:  RA lead: sensitivity = 0.5 mV, output = 2.5 V @ 0.4 msec  RV lead: sensitivity = 2.5 mV, output =2.5 V @ 0.4 msec  Arrhythmias: recurrent atrial fibrillation with rates up to 130 bpm from Nov 2 to 12  Reprogramming included: none   Overall device function is normal  All device programmable settings were evaluated per above and in the scanned document, along with iterative adjustments (capture thresholds) to assess and select the most appropriate final programming to provide for consistent delivery of the appropriate therapy and to verify function of the device. Assessment/Plan:  1. Nonvalvular persistent AF sp DCCV (10/1/21)  -Initially diagnosed 10/2020.  -CHADSVASC = (age, CAD). Continue apixaban 5 mg BID. Consider discontinuing antiplatelet as last PCI > 1 year and not at high thrombotic risk. In the interim, recommend pantoprazole 40 mg daily to reduce GI bleeding risk.  -In 10/2021, he was noted to be in persistent AF sicne 6/2021. He was treated with DCCV, which restored SR. He returned to AF in 11/2021. Dr Rui Gold recommended tikosyn load. He presents today, 11/29/21; for initiation of tikosyn. However, patient's baseline ECG with QTc of 477 msec with ventricular paced.  CrCl > 60.  -Continue  tikosyn 500 mcg.   - Ok for discharge today and on Monday at 10:20 he will have an EKG in office, he will need an EKG in one month as well and follow-up in office when able. 2. 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  -Stable device function historically. 3. CAD sp DIVINA x 1 mid LAD (10/4/17)  -Management per Dr Polly Singleton.  -See #1 regarding antiplatelet therapy. Thank you for allowing me to participate in your patient's care. Please call me if there are any questions. Discussed with Roselyn Escobar D.O.   Electronically signed by MOSES Padilla CNP on 12/2/2021 at 1:38 PM  Cardiac Electrophysiology  [address]    Attending Supervising Physicians SL[address]  I was present with the nurse practitioner during the history and exam. I discussed the findings and plans with the nurse practitioner and agree as documented in his note     Electronically signed by Roselyn Escobar DO on 12/2/21 at 10:27 PM EST

## 2021-12-03 ENCOUNTER — TELEPHONE (OUTPATIENT)
Dept: PRIMARY CARE CLINIC | Age: 76
End: 2021-12-03

## 2021-12-03 NOTE — TELEPHONE ENCOUNTER
Sj 45 Transitions Initial Follow Up Call    Outreach made within 2 business days of discharge: Yes    Patient: Starr Ashby Patient : 1945   MRN: [id number]  Reason for Admission: Atrial fibrillation  Discharge Date: 21       Spoke with: Wife Chay Torres    Discharge department/facility: [address] [place] [address]    TCM Interactive Patient Contact:  Was patient able to fill all prescriptions: Yes  Was patient instructed to bring all medications to the follow-up visit: Yes  Is patient taking all medications as directed in the discharge summary?  Yes  Does patient understand their discharge instructions: Yes  Does patient have questions or concerns that need addressed prior to 7-14 day follow up office visit: no    Scheduled appointment with PCP within 7-14 days    Spoke to patient wife Lorelei,No need to schedule with PCP,stated they will be leaving for [place] on  after appointment with ELECTRO PHYS    Follow Up  Future Appointments   Date Time Provider Umesh Felix   2021 10:20 AM SCHEDULE, EP LAB/[facility]   12/15/2021  8:00 AM SCHEDULE, DEVICE CLINIC [facility]   12/15/2021  8:00 AM Vasyl Butler MD [facility]LECTRO [facility]YS [facility]       [place], [place]

## 2021-12-06 ENCOUNTER — NURSE ONLY (OUTPATIENT)
Dept: NON INVASIVE DIAGNOSTICS | Age: 76
End: 2021-12-06
Payer: MEDICARE

## 2021-12-06 DIAGNOSIS — I44.1 SECOND DEGREE AV BLOCK, MOBITZ TYPE II: ICD-10-CM

## 2021-12-06 DIAGNOSIS — Z79.899 VISIT FOR MONITORING TIKOSYN THERAPY: Primary | ICD-10-CM

## 2021-12-06 DIAGNOSIS — Z51.81 VISIT FOR MONITORING TIKOSYN THERAPY: Primary | ICD-10-CM

## 2021-12-06 DIAGNOSIS — I48.91 ATRIAL FIBRILLATION, UNSPECIFIED TYPE (HCC): ICD-10-CM

## 2021-12-06 DIAGNOSIS — Z95.0 PACEMAKER: ICD-10-CM

## 2021-12-06 PROCEDURE — 93000 ELECTROCARDIOGRAM COMPLETE: CPT | Performed by: INTERNAL MEDICINE

## 2021-12-06 NOTE — PROGRESS NOTES
Patient was in today for EKG per Dr Allison Lewis, 1 week s/p tikosyn loading. Patient has no complaints.     Jung Lopez

## 2021-12-13 RX ORDER — DOFETILIDE 0.5 MG/1
500 CAPSULE ORAL EVERY 12 HOURS SCHEDULED
Qty: 180 CAPSULE | Refills: 1 | Status: SHIPPED
Start: 2021-12-13 | End: 2022-06-30 | Stop reason: SDUPTHER

## 2021-12-13 NOTE — TELEPHONE ENCOUNTER
Requesting Tikosyn refill - CrCl calculated off the following information:    Tikosyn dosage: 500 mcg     Age: 76   Ht: 1.854 m  Wt: 99.8 kg  Cr: 1.0 mg/dl (based off labs on 12/2021)  CrCl: 69.70 mL/min    Last QTc: 471 msec (based on last EKG on 12/2021)

## 2022-06-06 RX ORDER — CLOPIDOGREL BISULFATE 75 MG/1
TABLET ORAL
Qty: 90 TABLET | Refills: 3 | Status: SHIPPED | OUTPATIENT
Start: 2022-06-06

## 2022-06-28 NOTE — PROGRESS NOTES
Cardiac Electrophysiology Outpatient Progress Note    Tania Escalona  1945  Date of Service: 6/30/2022  PCP: Marcelino Currie DO  Cardiologist: Nick Cortez DO  Electrophysiologist: Jacqueline Landrum MD     Subjective: Tania Escalona is seen in cardiac electrophysiology clinic for follow up and management of second degree AV block Mobitz II AVB status post dual chamber permanent pacemaker implantation on 9/23/20. He was found to have 19 hours of PAF on 10/6/20 and was started on Eliquis 5 mg bid on 10/8/20. He was noted to be in persistent AF since June 2021 and subsequently underwent DC-cardioversion in October 2021. He was noted to have recurrent AF in November 2021 and was admitted for Tikosyn initiation on 11/29/21. Since hospitalization, the patient states he feels well but does report BONDS which improved since started on Tikosyn. He offers no complaints from a device POV. The device site looks well healed and free from infection or erosion. The patient denies any chest pain, palpitations, dizziness, syncope, orthopnea or paroxysmal nocturnal dyspnea. The patient continues to be followed remotely. He presents today in NSR. AF burden was noted to be at 29% per today's interrogation.     Patient Active Problem List   Diagnosis    Hyperlipidemia    Hand pain, right    Bronchitis, acute    Headache    Tinnitus    CAD (coronary artery disease)    Mobitz (type) I (Wenckebach's) atrioventricular block    Coronary artery disease due to lipid rich plaque    Chest pain    Second degree AV block, Mobitz type II    Pacemaker    PAF (paroxysmal atrial fibrillation) (HCC)    Persistent atrial fibrillation (HCC)    Atrial fibrillation (HCC)     Current Outpatient Medications   Medication Sig Dispense Refill    clopidogrel (PLAVIX) 75 MG tablet TAKE 1 TABLET DAILY 90 tablet 3    rosuvastatin (CRESTOR) 10 MG tablet TAKE 1 TABLET DAILY 90 tablet 1    dofetilide (TIKOSYN) 500 MCG capsule Take 1 Testing:     Echocardiogram 9/23/20:  Findings      Left Ventricle   Normal left ventricular size and systolic function. Ejection fraction is visually estimated at 55-60%. Indeterminate diastolic function. No regional wall motion abnormalities seen. Normal left ventricular wall thickness. Right Ventricle   Normal right ventricular size and function. Left Atrium   The left atrium visually appears mildly dilated. Indexed volume appears under measured at 26 ml/m2. The interatrial septum appears intact. Right Atrium   Normal right atrial size. Mitral Valve   Structurally normal mitral valve. No evidence of mitral valve stenosis. Physiologic mitral regurgitation. Tricuspid Valve   The tricuspid valve appears structurally normal.   Physiologic and/or trace tricuspid regurgitation. Aortic Valve   Structurally normal aortic valve. The aortic valve is trileaflet. No hemodynamically significant aortic stenosis is present. No evidence of aortic valve regurgitation. Pulmonic Valve   The pulmonic valve was not well visualized. No evidence of pulmonic valve stenosis. No evidence of any pulmonic regurgitation. Pericardial Effusion   No evidence of pericardial effusion. Aorta   Aortic root dimension within normal limits. Miscellaneous   Normal Inferior Vena Cava diameter and respiratory variation. Conclusions      Summary   Normal left ventricular size and systolic function. Ejection fraction is visually estimated at 55-60%. Indeterminate diastolic function. No regional wall motion abnormalities seen. Normal left ventricular wall thickness. Normal right ventricular size and function. The left atrium visually appears mildly dilated. No significant valvular abnormalities.       Signature      ----------------------------------------------------------------   Electronically signed by Lidia Paredes MD(Interpreting   physician) on 09/23/2020 01:22 PM   ----------------------------------------------------------------     M-Mode/2D Measurements & Calculations      LV Diastolic    LV Systolic Dimension: 3.7   AV Cusp Separation: 2.1 cmLA   Dimension: 5.5  cm                           Dimension: 3.9 cmAO Root   cm              LV Volume Diastolic: 979.4   Dimension: 2.8 cm   LV FS:32.7 %    ml   LV PW           LV Volume Systolic: 19.1 ml   Diastolic: 0.9  LV EDV/LV EDV Index: 146.5   cm              ml/68 ml/m^2LV ESV/LV ESV    RV Diastolic Dimension: 3.7   Septum          Index: 56.7 ml/26ml/ m^2     cm   Diastolic: 0.8  EF Calculated: 61.3 %   cm              LV Mass Index: 80 l/min*m^2  Ascending Aorta: 3 cm                                                LA volume/Index: 56.1 ml   LV Mass: 172.72                              /26.11ml/m^2   g               LVOT: 2 cm                   RA Area: 15.3 cm^2     Doppler Measurements & Calculations      MV Peak E-Wave: 0.57 AV Peak Velocity: 1.29 LVOT Peak Velocity: 1.07 m/s   m/s                  m/s                    LVOT Mean Velocity: 0.78 m/s   MV Peak A-Wave: 0.76 AV Peak Gradient: 6.65 LVOT Peak Gradient: 4.6   m/s                  mmHg                   mmHgLVOT Mean Gradient: 2.7   MV E/A Ratio: 0.74   AV Mean Velocity: 0.92 mmHg   MV Peak Gradient:    m/s   2.8 mmHg             AV Mean Gradient: 3.8   MV Mean Gradient:    mmHg   1.1 mmHg             AV VTI: 27 cm   MV Mean Velocity:    AV Area   0.48 m/s             (Continuity):2.66 cm^2 PV Peak Velocity: 1.18 m/s   MV Deceleration                             PV Peak Gradient: 5.53 mmHg   Time: 226.5 msec     LVOT VTI: 22.9 cm      PV Mean Velocity: 0.76 m/s   MV P1/2t: 71.6 msec  IVRT: 100.3 msec       PV Mean Gradient: 2.7 mmHg   MVA by PHT:3.07 cm^2   MV Area   (continuity): 3.2   cm^2   MV E' Septal   Velocity: 0.06 m/s   MV E' Lateral   Velocity: 7 m/s    Preventice Patch MCOT--6/2020:  1. Sinus rhythm/sinus bradycardia.    2. Intermittent Mobitz I and Mobitz II AVB. 3. 5 beats of NSVT. 4. No AF or SVT. LHC(10/3/17): Left main:  Short.  No angiographically significant stenosis.  LAD:  Very tortous vessel, mid, eccentric, 80-85% diffuse stenosis right where the second diagonal takes off.  D1:  No angiographically significant stenosis. D2:  :Large vessel.  No angiographically significant stenosis. Circumflex:  No angiographically significant stenosis.  OM1:  Tiny vessel. OM2:  Tiny vessel. OM3:  Large vessel.  No angiographically significant stenosis. Right coronary artery:  Dominant vessel.  Proximal diffuse 30% stenosis. Left ventriculogram:  Normal LV size and systolic function.  No wall motion abnormalities.  Ejection fraction of 60-65%.     Device Interrogation: 6/30/22   Make/Model [employer] Accolade MRI  Mode DDDR 60/130  Battery Voltage/Longevity: 10 years    Pacing: A: 19%  RV: 99%    P wave: 8.7 mV  Impedance: 736 ohms   Threshold: 0.6 V @ 0.4 ms  RV R wave: 10.6 mV  Impedance: 624 ohms   Threshold: 0.7 V @ 0.4 ms  Episodes: AF burden: 29%, longest >48 hours - previously counter not cleared. Reprogramming included: see below  Overall device function is normal    All device programmable settings were evaluated per above and in the scanned document, along with iterative adjustments (capture thresholds) to assess and select the most appropriate final programming to provide for consistent delivery of the appropriate therapy and to verify function of the device. EKG 06/30/22: Normal sinus rhythm with A sensed Bi V paced 75 bpm, QTc 444 ms. Impression:    1.  Paroxysmal atrial fibrillation  - [id number] of 4 (age, HTN, CAD)  - Found to have 19 hours of PAF on 10/6/20.  - Started on Eliquis 5 mg bid on 10/8/20.   - Noted to be in persistent AF since June 2021 and subsequently underwent DC-cardioversion in October 2021.   - Noted to have recurrent AF in November 2021 and was admitted for Tikosyn initiation on 11/29/21.  - On Toprol XL for rate control.  - Presents in NSR with stable QTc.  - AF burden 29% - but previous counter was not cleared. - CrCl by IBW of 69.70 mL/min based on Cr of 1.0 on 12/2/21  - Re-education on importance of well controlled HTN (goal BP < 130/80), adequate weight control (goal BMI of < 27), physical activity consisting of moderate cardiopulmonary exercise up to a goal of 250 min/wk, daily compliance with CPAP in treating sleep apnea, smoking cessation and limited ETOH intake. 2. Pacemaker in situ   - DOI 09/23/2020  - BSCI; dual chamber  - Indication: second degree AV block Mobitz type II  - Normal device function. 3. Coronary artery disease  - Status post PCI to the LAD 10/3/17.  - On Plavix, Toprol XL and Crestor    4. Hyperlipidemia  - On Crestor    5. Hypertension  - Well controlled. - On Norvasc and Toprol XL     6. Status post THR    Recommendations:    1. Normal pacemaker function. 2. Continue Tikosyn 500 mcg every 12 hours. 3. Continue Toprol XL 25 mg bid. 4. Continue Eliquis 5 mg bid. 5. Obtain EKG, BMP and magnesium every 3 to 6 months including today. 6. Remote monitoring every 3 months. 7. Follow up in 6 months or sooner PRN. Encouraged the patient to call the office for any questions or concerns. Thank you for allowing me to participate in your patient's care. I have spent a total of 40 minutes with the patient and the family reviewing the above stated recommendations. And a total of >50% of that time involved face-to-face time providing counseling and/or coordination of care with the other providers, preparation for the clinic visit, reviewing records/tests, counseling/education of the patient, ordering medications/tests/procedures, coordinating care, and documenting clinical information in the EHR.      Sully Zhu MD  Cardiac Electrophysiology  [address]  [facility]  3:07 PM  6/30/2022

## 2022-06-30 ENCOUNTER — OFFICE VISIT (OUTPATIENT)
Dept: NON INVASIVE DIAGNOSTICS | Age: 77
End: 2022-06-30
Payer: MEDICARE

## 2022-06-30 VITALS
SYSTOLIC BLOOD PRESSURE: 116 MMHG | HEART RATE: 75 BPM | DIASTOLIC BLOOD PRESSURE: 78 MMHG | BODY MASS INDEX: 28.12 KG/M2 | WEIGHT: 212.2 LBS | RESPIRATION RATE: 16 BRPM | HEIGHT: 73 IN

## 2022-06-30 DIAGNOSIS — Z95.0 PACEMAKER: Primary | ICD-10-CM

## 2022-06-30 DIAGNOSIS — I48.0 PAF (PAROXYSMAL ATRIAL FIBRILLATION) (HCC): ICD-10-CM

## 2022-06-30 LAB
ANION GAP SERPL CALCULATED.3IONS-SCNC: 11 MMOL/L (ref 7–16)
BUN BLDV-MCNC: 20 MG/DL (ref 6–23)
CALCIUM SERPL-MCNC: 9.9 MG/DL (ref 8.6–10.2)
CHLORIDE BLD-SCNC: 107 MMOL/L (ref 98–107)
CO2: 22 MMOL/L (ref 22–29)
CREAT SERPL-MCNC: 1.2 MG/DL (ref 0.7–1.2)
GFR AFRICAN AMERICAN: >60
GFR NON-AFRICAN AMERICAN: 59 ML/MIN/1.73
GLUCOSE BLD-MCNC: 104 MG/DL (ref 74–99)
MAGNESIUM: 2.3 MG/DL (ref 1.6–2.6)
POTASSIUM SERPL-SCNC: 4.8 MMOL/L (ref 3.5–5)
SODIUM BLD-SCNC: 140 MMOL/L (ref 132–146)

## 2022-06-30 PROCEDURE — 99215 OFFICE O/P EST HI 40 MIN: CPT | Performed by: INTERNAL MEDICINE

## 2022-06-30 PROCEDURE — 93000 ELECTROCARDIOGRAM COMPLETE: CPT | Performed by: INTERNAL MEDICINE

## 2022-06-30 PROCEDURE — 1123F ACP DISCUSS/DSCN MKR DOCD: CPT | Performed by: INTERNAL MEDICINE

## 2022-06-30 RX ORDER — DOFETILIDE 0.5 MG/1
500 CAPSULE ORAL EVERY 12 HOURS SCHEDULED
Qty: 180 CAPSULE | Refills: 1 | Status: SHIPPED | OUTPATIENT
Start: 2022-06-30

## 2022-08-02 RX ORDER — AMLODIPINE BESYLATE 5 MG/1
5 TABLET ORAL DAILY
Qty: 90 TABLET | Refills: 3 | Status: SHIPPED | OUTPATIENT
Start: 2022-08-02

## 2022-08-04 DIAGNOSIS — I25.10 CORONARY ARTERY DISEASE INVOLVING NATIVE CORONARY ARTERY OF NATIVE HEART WITHOUT ANGINA PECTORIS: ICD-10-CM

## 2022-08-04 RX ORDER — ROSUVASTATIN CALCIUM 10 MG/1
TABLET, COATED ORAL
Qty: 90 TABLET | Refills: 0 | Status: SHIPPED
Start: 2022-08-04 | End: 2022-10-29

## 2022-09-16 ENCOUNTER — OFFICE VISIT (OUTPATIENT)
Dept: CARDIOLOGY CLINIC | Age: 77
End: 2022-09-16
Payer: MEDICARE

## 2022-09-16 VITALS
SYSTOLIC BLOOD PRESSURE: 128 MMHG | BODY MASS INDEX: 28.02 KG/M2 | WEIGHT: 211.4 LBS | DIASTOLIC BLOOD PRESSURE: 80 MMHG | HEIGHT: 73 IN | RESPIRATION RATE: 18 BRPM | HEART RATE: 71 BPM

## 2022-09-16 DIAGNOSIS — I25.10 CORONARY ARTERY DISEASE INVOLVING NATIVE CORONARY ARTERY OF NATIVE HEART WITHOUT ANGINA PECTORIS: Primary | ICD-10-CM

## 2022-09-16 PROCEDURE — 99214 OFFICE O/P EST MOD 30 MIN: CPT | Performed by: INTERNAL MEDICINE

## 2022-09-16 PROCEDURE — 93000 ELECTROCARDIOGRAM COMPLETE: CPT | Performed by: INTERNAL MEDICINE

## 2022-09-16 PROCEDURE — 1123F ACP DISCUSS/DSCN MKR DOCD: CPT | Performed by: INTERNAL MEDICINE

## 2022-09-16 NOTE — PROGRESS NOTES
tablet 3    Cholecalciferol (VITAMIN D3) 50 MCG (2000 UT) CAPS Take by mouth      acetaminophen (TYLENOL) 500 MG tablet Take 500 mg by mouth as needed for Pain      Multiple Vitamins-Minerals (CENTRUM SILVER ADULT 50+) TABS Take 1 tablet by mouth daily      Omega-3 Fatty Acids (FISH OIL EXTRA STRENGTH PO) Take 1,000 mg by mouth daily       Specialty Vitamins Products (PROSTATE PO) Take by mouth daily       No current facility-administered medications for this visit. No Known Allergies    Chief Complaint:  Christopher Mcadams is here today for follow up and management/recomendations for coronary artery disease. History of Present Illness: Christopher Mcadams states that he does limited yard work on the side of his condo, goes up flights of stairs, go shopping, and does household chores. He also walks 3 miles and goes golfing. He denies any chest discomfort or dyspnea with any of these activities. He denies any orthopnea/PND, or lower extremity edema. He denies any palpitations or presyncopal symptoms. REVIEW OF SYSTEMS:  As above. Patient does not complain of any fever, chills, nausea, vomiting or diarrhea. No focal, motor or neurological deficits. No changes in his/her vision, hearing, bowel or bladder habits. He is not known to have a history of thyroid problems. No recent nose bleeds. PHYSICAL EXAM:  Vitals:    09/16/22 1120   BP: 128/80   Pulse: 71   Resp: 18   Weight: 211 lb 6.4 oz (95.9 kg)   Height: 6' 1\" (1.854 m)       GENERAL:  He is alert and oriented x 3, communicates well, in no distress. NECK:  No masses, trachea is mid position. Supple, full ROM, no JVD or bruits. No palpable thyromegaly or lymphadenopathy. HEART:  Regular rate and rhythm. Normal S1 and S2. No abnormal murmurs on exam.  LUNGS:  Clear to auscultation bilaterally on exam.  No use of accessory muscles. symmetrical excursion. ABDOMEN:  Soft, non-tender. Normal bowel sounds. EXTREMITIES:  Full ROM x 4.   No bilateral lower edema on exam.  Good distal pulses. EYES:  Extraocular muscles intact. PERRL. Normal lids & conjunctiva. ENT:  Nares are clear & not bleeding. Moist mucosa. Normal lips formation. No external masses   NEURO: no tremors, full ROM x 4, EOMI. SKIN:  Warm, dry and intact. Normal turgor. EKG: A sensed -V paced, 71 beats per minute. Assessment:   Coronary artery disease. No symptoms of recurring ischemia. Negative stress test 8-21. Orthostatic symptoms improved. Paroxysmal atrial fibrillation. Follows with EP. Second-degree Mobitz 2 heart block, and pacemaker. Follows with EP. Hypertension. well controlled today. Hypercholesterolemia      Recommendations:  He is following the cholesterol with Dr. Yarelis Bowden. Asking for preop risk stratification for carpal tunnel surgery. He is low risk for carpal tunnel procedure. Okay with cardiology to change the Plavix to aspirin 5 days prior to the procedure. I recommend not holding the aspirin perioperatively with his history of coronary artery disease. I will defer the Eliquis recommendations to electrophysiology. Thank you for allowing me to participate in your patient's care.       [address], [address] [facility]  Interventional Cardiology

## 2022-10-05 RX ORDER — METOPROLOL SUCCINATE 25 MG/1
TABLET, EXTENDED RELEASE ORAL
Qty: 180 TABLET | Refills: 3 | Status: SHIPPED | OUTPATIENT
Start: 2022-10-05

## 2022-10-05 RX ORDER — APIXABAN 5 MG/1
TABLET, FILM COATED ORAL
Qty: 180 TABLET | Refills: 3 | Status: SHIPPED | OUTPATIENT
Start: 2022-10-05

## 2022-10-27 DIAGNOSIS — I25.10 CORONARY ARTERY DISEASE INVOLVING NATIVE CORONARY ARTERY OF NATIVE HEART WITHOUT ANGINA PECTORIS: ICD-10-CM

## 2022-10-29 RX ORDER — ROSUVASTATIN CALCIUM 10 MG/1
TABLET, COATED ORAL
Qty: 90 TABLET | Refills: 0 | Status: SHIPPED | OUTPATIENT
Start: 2022-10-29

## 2022-12-24 DIAGNOSIS — I48.0 PAF (PAROXYSMAL ATRIAL FIBRILLATION) (HCC): ICD-10-CM

## 2022-12-27 NOTE — TELEPHONE ENCOUNTER
Patient's wife called back and stated they are in [place]. They will look into places patient can have ekg and labwork done in [place] and call back with name and fax number.

## 2022-12-28 DIAGNOSIS — Z79.899 VISIT FOR MONITORING TIKOSYN THERAPY: Primary | ICD-10-CM

## 2022-12-28 DIAGNOSIS — Z51.81 VISIT FOR MONITORING TIKOSYN THERAPY: Primary | ICD-10-CM

## 2022-12-28 PROCEDURE — 93000 ELECTROCARDIOGRAM COMPLETE: CPT | Performed by: INTERNAL MEDICINE

## 2022-12-28 RX ORDER — DOFETILIDE 0.5 MG/1
CAPSULE ORAL
Qty: 60 CAPSULE | Refills: 0 | Status: SHIPPED | OUTPATIENT
Start: 2022-12-28

## 2022-12-28 NOTE — TELEPHONE ENCOUNTER
Patient will go to Radiology [facility] [phone], Fax [phone]) and Lab [facility], Fax [phone]). Orders faxed.

## 2023-01-11 ENCOUNTER — TELEPHONE (OUTPATIENT)
Dept: NON INVASIVE DIAGNOSTICS | Age: 78
End: 2023-01-11

## 2023-01-11 NOTE — TELEPHONE ENCOUNTER
Pt called to see if we received the Ekg and lab work that he had done in [place] the end of December 2022. We do have the EKG but not the lab work. MA will call Lab center [phone] to get lab results. Pt also provided new insurance and pharmacy information that will be updated in chart. Pt verbalized understanding.      Electronically signed by Isamar Hinojosa MA on 1/11/2023 at 4:00 PM

## 2023-01-12 DIAGNOSIS — I48.0 PAF (PAROXYSMAL ATRIAL FIBRILLATION) (HCC): ICD-10-CM

## 2023-01-12 RX ORDER — DOFETILIDE 0.5 MG/1
CAPSULE ORAL
Qty: 180 CAPSULE | Refills: 1 | Status: SHIPPED | OUTPATIENT
Start: 2023-01-12

## 2023-01-12 NOTE — TELEPHONE ENCOUNTER
Will refill at this time but he will need an office follow-up once he returns to the [facility] area. Thanks.

## 2023-01-20 DIAGNOSIS — I48.0 PAF (PAROXYSMAL ATRIAL FIBRILLATION) (HCC): ICD-10-CM

## 2023-01-23 ENCOUNTER — TELEPHONE (OUTPATIENT)
Dept: NON INVASIVE DIAGNOSTICS | Age: 78
End: 2023-01-23

## 2023-01-23 DIAGNOSIS — I25.10 CORONARY ARTERY DISEASE INVOLVING NATIVE CORONARY ARTERY OF NATIVE HEART WITHOUT ANGINA PECTORIS: ICD-10-CM

## 2023-01-23 RX ORDER — ROSUVASTATIN CALCIUM 10 MG/1
TABLET, COATED ORAL
Qty: 90 TABLET | Refills: 0 | Status: SHIPPED | OUTPATIENT
Start: 2023-01-23

## 2023-01-23 RX ORDER — DOFETILIDE 0.5 MG/1
CAPSULE ORAL
Qty: 60 CAPSULE | Refills: 0 | Status: SHIPPED | OUTPATIENT
Start: 2023-01-23

## 2023-01-23 NOTE — TELEPHONE ENCOUNTER
Left message to call the office back    CK pt,6 mo OV (12/30/2022) BSCI PPM (wireless) on Tikosyn w/ NP  LEAVING 11/31/2022 NOT BACK INTO 05/01/2022 (May appt)

## 2023-02-14 RX ORDER — AMLODIPINE BESYLATE 5 MG/1
5 TABLET ORAL DAILY
Qty: 90 TABLET | Refills: 3 | Status: SHIPPED | OUTPATIENT
Start: 2023-02-14

## 2023-03-06 RX ORDER — CLOPIDOGREL BISULFATE 75 MG/1
TABLET ORAL
Qty: 90 TABLET | Refills: 3 | Status: SHIPPED | OUTPATIENT
Start: 2023-03-06

## 2023-03-20 DIAGNOSIS — I48.0 PAF (PAROXYSMAL ATRIAL FIBRILLATION) (HCC): Primary | ICD-10-CM

## 2023-03-20 RX ORDER — METOPROLOL SUCCINATE 25 MG/1
TABLET, EXTENDED RELEASE ORAL
Qty: 180 TABLET | Refills: 3 | Status: SHIPPED | OUTPATIENT
Start: 2023-03-20

## 2023-03-31 DIAGNOSIS — I25.10 CORONARY ARTERY DISEASE INVOLVING NATIVE CORONARY ARTERY OF NATIVE HEART WITHOUT ANGINA PECTORIS: ICD-10-CM

## 2023-03-31 RX ORDER — ROSUVASTATIN CALCIUM 10 MG/1
TABLET, COATED ORAL
Qty: 90 TABLET | Refills: 0 | Status: SHIPPED
Start: 2023-03-31 | End: 2023-05-08 | Stop reason: SDUPTHER

## 2023-05-08 ENCOUNTER — OFFICE VISIT (OUTPATIENT)
Dept: PRIMARY CARE CLINIC | Age: 78
End: 2023-05-08
Payer: MEDICARE

## 2023-05-08 VITALS
SYSTOLIC BLOOD PRESSURE: 136 MMHG | HEART RATE: 70 BPM | DIASTOLIC BLOOD PRESSURE: 82 MMHG | OXYGEN SATURATION: 97 % | WEIGHT: 216 LBS | TEMPERATURE: 97.7 F | BODY MASS INDEX: 28.5 KG/M2

## 2023-05-08 DIAGNOSIS — Z00.00 MEDICARE ANNUAL WELLNESS VISIT, SUBSEQUENT: Primary | ICD-10-CM

## 2023-05-08 DIAGNOSIS — E78.2 MIXED HYPERLIPIDEMIA: ICD-10-CM

## 2023-05-08 DIAGNOSIS — Z12.5 SCREENING PSA (PROSTATE SPECIFIC ANTIGEN): ICD-10-CM

## 2023-05-08 DIAGNOSIS — G57.92 NEUROPATHY OF LEFT LOWER EXTREMITY: ICD-10-CM

## 2023-05-08 DIAGNOSIS — I25.10 CORONARY ARTERY DISEASE INVOLVING NATIVE CORONARY ARTERY OF NATIVE HEART WITHOUT ANGINA PECTORIS: ICD-10-CM

## 2023-05-08 LAB
ALBUMIN SERPL-MCNC: 4.4 G/DL (ref 3.5–5.2)
ALP SERPL-CCNC: 75 U/L (ref 40–129)
ALT SERPL-CCNC: 28 U/L (ref 0–40)
ANION GAP SERPL CALCULATED.3IONS-SCNC: 13 MMOL/L (ref 7–16)
AST SERPL-CCNC: 29 U/L (ref 0–39)
BILIRUB SERPL-MCNC: 0.4 MG/DL (ref 0–1.2)
BUN SERPL-MCNC: 19 MG/DL (ref 6–23)
CALCIUM SERPL-MCNC: 9.4 MG/DL (ref 8.6–10.2)
CHLORIDE SERPL-SCNC: 104 MMOL/L (ref 98–107)
CHOLESTEROL, TOTAL: 139 MG/DL (ref 0–199)
CO2 SERPL-SCNC: 23 MMOL/L (ref 22–29)
CREAT SERPL-MCNC: 1 MG/DL (ref 0.7–1.2)
GLUCOSE SERPL-MCNC: 91 MG/DL (ref 74–99)
HDLC SERPL-MCNC: 30 MG/DL
LDLC SERPL CALC-MCNC: 86 MG/DL (ref 0–99)
POTASSIUM SERPL-SCNC: 5.1 MMOL/L (ref 3.5–5)
PROT SERPL-MCNC: 7.3 G/DL (ref 6.4–8.3)
PSA SERPL-MCNC: 5.26 NG/ML (ref 0–4)
SODIUM SERPL-SCNC: 140 MMOL/L (ref 132–146)
TRIGL SERPL-MCNC: 114 MG/DL (ref 0–149)
VLDLC SERPL CALC-MCNC: 23 MG/DL

## 2023-05-08 PROCEDURE — 1123F ACP DISCUSS/DSCN MKR DOCD: CPT | Performed by: FAMILY MEDICINE

## 2023-05-08 PROCEDURE — G0439 PPPS, SUBSEQ VISIT: HCPCS | Performed by: FAMILY MEDICINE

## 2023-05-08 PROCEDURE — 99213 OFFICE O/P EST LOW 20 MIN: CPT | Performed by: FAMILY MEDICINE

## 2023-05-08 RX ORDER — ROSUVASTATIN CALCIUM 10 MG/1
TABLET, COATED ORAL
Qty: 90 TABLET | Refills: 3 | Status: SHIPPED | OUTPATIENT
Start: 2023-05-08

## 2023-05-08 SDOH — ECONOMIC STABILITY: FOOD INSECURITY: WITHIN THE PAST 12 MONTHS, THE FOOD YOU BOUGHT JUST DIDN'T LAST AND YOU DIDN'T HAVE MONEY TO GET MORE.: NEVER TRUE

## 2023-05-08 SDOH — ECONOMIC STABILITY: INCOME INSECURITY: HOW HARD IS IT FOR YOU TO PAY FOR THE VERY BASICS LIKE FOOD, HOUSING, MEDICAL CARE, AND HEATING?: NOT HARD AT ALL

## 2023-05-08 SDOH — ECONOMIC STABILITY: FOOD INSECURITY: WITHIN THE PAST 12 MONTHS, YOU WORRIED THAT YOUR FOOD WOULD RUN OUT BEFORE YOU GOT MONEY TO BUY MORE.: NEVER TRUE

## 2023-05-08 SDOH — ECONOMIC STABILITY: HOUSING INSECURITY
IN THE LAST 12 MONTHS, WAS THERE A TIME WHEN YOU DID NOT HAVE A STEADY PLACE TO SLEEP OR SLEPT IN A SHELTER (INCLUDING NOW)?: NO

## 2023-05-08 ASSESSMENT — PATIENT HEALTH QUESTIONNAIRE - PHQ9
SUM OF ALL RESPONSES TO PHQ QUESTIONS 1-9: 0
SUM OF ALL RESPONSES TO PHQ9 QUESTIONS 1 & 2: 0
2. FEELING DOWN, DEPRESSED OR HOPELESS: 0
1. LITTLE INTEREST OR PLEASURE IN DOING THINGS: 0
SUM OF ALL RESPONSES TO PHQ QUESTIONS 1-9: 0

## 2023-05-08 ASSESSMENT — ENCOUNTER SYMPTOMS: SHORTNESS OF BREATH: 0

## 2023-05-08 ASSESSMENT — LIFESTYLE VARIABLES
HOW OFTEN DO YOU HAVE A DRINK CONTAINING ALCOHOL: 2-4 TIMES A MONTH
HOW MANY STANDARD DRINKS CONTAINING ALCOHOL DO YOU HAVE ON A TYPICAL DAY: 1 OR 2

## 2023-05-08 NOTE — PROGRESS NOTES
Medicare Annual Wellness Visit    Nupur Willis is here for Medicare AWV    Assessment & Plan   Medicare annual wellness visit, subsequent  Coronary artery disease involving native coronary artery of native heart without angina pectoris  -     rosuvastatin (CRESTOR) 10 MG tablet; TAKE 1 TABLET BY MOUTH DAILY, Disp-90 tablet, R-3Requesting 1 year supplyNormal  Mixed hyperlipidemia  -     Lipid Panel; Future  -     Comprehensive Metabolic Panel; Future  Screening PSA (prostate specific antigen)  -     PSA Screening; Future  Neuropathy of left lower extremity  -     XR LUMBAR SPINE (MIN 4 VIEWS); Future  -     External Referral To Physical Therapy    Recommendations for Preventive Services Due: see orders and patient instructions/AVS.  Recommended screening schedule for the next 5-10 years is provided to the patient in written form: see Patient Instructions/AVS.     Return for Medicare Annual Wellness Visit in 1 year. Subjective   The following acute and/or chronic problems were also addressed today:    Nupur Willis, a male of 65 y.o. came to the office 5/8/2023. Patient Active Problem List   Diagnosis    Hyperlipidemia    Hand pain, right    Bronchitis, acute    Headache    Tinnitus    CAD (coronary artery disease)    Mobitz (type) I (Wenckebach's) atrioventricular block    Coronary artery disease due to lipid rich plaque    Chest pain    Second degree AV block, Mobitz type II    Pacemaker    PAF (paroxysmal atrial fibrillation) (HCC)    Persistent atrial fibrillation (HCC)    Atrial fibrillation ([address]          Hyperlipidemia  This is a chronic problem. The current episode started more than 1 year ago. The problem is controlled. Pertinent negatives include no chest pain, leg pain, myalgias or shortness of breath. Current antihyperlipidemic treatment includes statins. The current treatment provides moderate improvement of lipids. There are no compliance problems. Neuropathy    It is located in the [place] region.  The

## 2023-05-08 NOTE — PATIENT INSTRUCTIONS
Hearing Loss: Care Instructions  Overview     Hearing loss is a sudden or slow decrease in how well you hear. It can range from mild to severe. Permanent hearing loss can occur with aging. It also can happen when you are exposed long-term to loud noise. Examples include listening to loud music, riding motorcycles, or being around other loud machines. Hearing loss can affect your work and home life. It can make you feel lonely or depressed. You may feel that you have lost your independence. But hearing aids and other devices can help you hear better and feel connected to others. Follow-up care is a key part of your treatment and safety. Be sure to make and go to all appointments, and call your doctor if you are having problems. It's also a good idea to know your test results and keep a list of the medicines you take. How can you care for yourself at home? Avoid loud noises whenever possible. This helps keep your hearing from getting worse. Always wear hearing protection around loud noises. Wear a hearing aid as directed. See a professional who can help you pick a hearing aid that fits you. Have hearing tests as your doctor suggests. They can show whether your hearing has changed. Your hearing aid may need to be adjusted. Use other devices as needed. These may include:  Telephone amplifiers and hearing aids that can connect to a television, stereo, radio, or microphone. Devices that use lights or vibrations. These alert you to the doorbell, a ringing telephone, or a baby monitor. Television closed-captioning. This shows the words at the bottom of the screen. Most new TVs can do this. TTY (text telephone). This lets you type messages back and forth on the telephone instead of talking or listening. These devices are also called TDD. When messages are typed on the keyboard, they are sent over the phone line to a receiving TTY. The message is shown on a monitor.   Use text messaging, social media, and email

## 2023-05-11 ENCOUNTER — TELEPHONE (OUTPATIENT)
Dept: PRIMARY CARE CLINIC | Age: 78
End: 2023-05-11

## 2023-05-11 DIAGNOSIS — R97.20 ELEVATED PSA: Primary | ICD-10-CM

## 2023-05-11 DIAGNOSIS — I48.0 PAF (PAROXYSMAL ATRIAL FIBRILLATION) (HCC): ICD-10-CM

## 2023-05-11 DIAGNOSIS — Z12.5 ENCOUNTER FOR SCREENING FOR MALIGNANT NEOPLASM OF PROSTATE: ICD-10-CM

## 2023-05-11 RX ORDER — DOFETILIDE 0.5 MG/1
CAPSULE ORAL
Qty: 180 CAPSULE | Refills: 3 | OUTPATIENT
Start: 2023-05-11

## 2023-05-11 RX ORDER — DOFETILIDE 0.5 MG/1
CAPSULE ORAL
Qty: 180 CAPSULE | Refills: 1 | Status: SHIPPED | OUTPATIENT
Start: 2023-05-11

## 2023-05-11 NOTE — TELEPHONE ENCOUNTER
Discussed elevated PSA. We will recheck it in 3 months. Lab appointment only needed. Lumbar sacral x-ray shows some degenerative disc disease between his L4 and L5 and L5 and S1. There is some spurring. We will see if the physical therapy helps his numbness in his left leg. If no improvement would then recommend MRI of lumbar sacral spine.

## 2023-05-23 NOTE — PROGRESS NOTES
Cardiac Electrophysiology Outpatient Progress Note    Monet Zapata  1945  Date of Service: 5/25/2023  PCP: Elle Blake DO  Cardiologist: Jose Quiroz DO  Electrophysiologist: Harpal Villa MD     Subjective: Monet Zapata is seen in cardiac electrophysiology clinic for follow up and management of second degree AV block Mobitz II AVB status post dual chamber permanent pacemaker implantation on 9/23/20. He was found to have 19 hours of PAF on 10/6/20 and was started on Eliquis 5 mg BID on 10/8/20. He was noted to be in persistent AF since June 2021 and subsequently underwent DC-cardioversion in October 2021. He was noted to have recurrent AF in November 2021 and was admitted for Tikosyn initiation on 11/29/21. Since his last office visit,  the patient states he feels well. He continues on Tikosyn for rhythm suppression and his interrogation today shows a AF burden of <1%. He presents today in NSR with stable QTc. He offers no complaints from a device POV. The device site looks well healed and free from infection or erosion. The patient denies any chest pain, palpitations, dizziness, syncope, orthopnea or paroxysmal nocturnal dyspnea. The patient continues to be followed remotely.      Patient Active Problem List   Diagnosis    Hyperlipidemia    Tinnitus    Mobitz (type) I (Wenckebach's) atrioventricular block    Coronary artery disease due to lipid rich plaque    Second degree AV block, Mobitz type II    Pacemaker    PAF (paroxysmal atrial fibrillation) (HCC)    Persistent atrial fibrillation (HCC)     Current Outpatient Medications   Medication Sig Dispense Refill    dofetilide (TIKOSYN) 500 MCG capsule TAKE 1 CAPSULE BY MOUTH EVERY 12 HOURS 180 capsule 1    rosuvastatin (CRESTOR) 10 MG tablet TAKE 1 TABLET BY MOUTH DAILY 90 tablet 3    apixaban (ELIQUIS) 5 MG TABS tablet TAKE 1 TABLET TWICE A  tablet 3    metoprolol succinate (TOPROL XL) 25 MG extended release tablet TAKE 1 TABLET

## 2023-05-25 ENCOUNTER — OFFICE VISIT (OUTPATIENT)
Dept: NON INVASIVE DIAGNOSTICS | Age: 78
End: 2023-05-25
Payer: MEDICARE

## 2023-05-25 VITALS
SYSTOLIC BLOOD PRESSURE: 124 MMHG | DIASTOLIC BLOOD PRESSURE: 76 MMHG | WEIGHT: 218 LBS | HEIGHT: 73 IN | BODY MASS INDEX: 28.89 KG/M2 | HEART RATE: 71 BPM

## 2023-05-25 DIAGNOSIS — I48.0 PAF (PAROXYSMAL ATRIAL FIBRILLATION) (HCC): ICD-10-CM

## 2023-05-25 DIAGNOSIS — I48.0 PAF (PAROXYSMAL ATRIAL FIBRILLATION) (HCC): Primary | ICD-10-CM

## 2023-05-25 LAB — MAGNESIUM SERPL-MCNC: 2.2 MG/DL (ref 1.6–2.6)

## 2023-05-25 PROCEDURE — 99215 OFFICE O/P EST HI 40 MIN: CPT | Performed by: INTERNAL MEDICINE

## 2023-05-25 PROCEDURE — 93280 PM DEVICE PROGR EVAL DUAL: CPT | Performed by: INTERNAL MEDICINE

## 2023-05-25 PROCEDURE — 36415 COLL VENOUS BLD VENIPUNCTURE: CPT | Performed by: INTERNAL MEDICINE

## 2023-05-25 PROCEDURE — 1123F ACP DISCUSS/DSCN MKR DOCD: CPT | Performed by: INTERNAL MEDICINE

## 2023-05-25 RX ORDER — DOFETILIDE 0.5 MG/1
CAPSULE ORAL
Qty: 180 CAPSULE | Refills: 1 | Status: SHIPPED | OUTPATIENT
Start: 2023-05-25

## 2023-05-25 NOTE — PROGRESS NOTES
Patient Labs drawn form Right arm   Labs ordered by Dr Marianela Zabala  Patient tolerated well.      Electronically signed by Lidia Arreola MA on 5/25/2023 at 3:26 PM

## 2023-07-26 RX ORDER — AMLODIPINE BESYLATE 5 MG/1
TABLET ORAL
Qty: 90 TABLET | Refills: 3 | Status: SHIPPED | OUTPATIENT
Start: 2023-07-26

## 2023-08-07 DIAGNOSIS — I25.10 CORONARY ARTERY DISEASE INVOLVING NATIVE CORONARY ARTERY OF NATIVE HEART WITHOUT ANGINA PECTORIS: ICD-10-CM

## 2023-08-07 DIAGNOSIS — Z12.5 ENCOUNTER FOR SCREENING FOR MALIGNANT NEOPLASM OF PROSTATE: ICD-10-CM

## 2023-08-07 DIAGNOSIS — R97.20 ELEVATED PSA: ICD-10-CM

## 2023-08-07 LAB — PROSTATE SPECIFIC ANTIGEN: 3.78 NG/ML (ref 0–4)

## 2023-08-07 RX ORDER — ROSUVASTATIN CALCIUM 10 MG/1
TABLET, COATED ORAL
Qty: 90 TABLET | Refills: 1 | Status: SHIPPED | OUTPATIENT
Start: 2023-08-07

## 2023-08-08 ENCOUNTER — HOSPITAL ENCOUNTER (OUTPATIENT)
Dept: MRI IMAGING | Age: 78
Discharge: HOME | End: 2023-08-10
Payer: MEDICARE

## 2023-08-08 DIAGNOSIS — M47.816 LUMBAR SPONDYLOSIS: ICD-10-CM

## 2023-08-08 DIAGNOSIS — M51.36 DEGENERATION OF LUMBAR INTERVERTEBRAL DISC: ICD-10-CM

## 2023-08-08 DIAGNOSIS — G60.9 IDIOPATHIC PERIPHERAL NEUROPATHY: ICD-10-CM

## 2023-08-08 DIAGNOSIS — M47.817 LUMBOSACRAL SPONDYLOSIS WITHOUT MYELOPATHY: ICD-10-CM

## 2023-08-08 DIAGNOSIS — M51.37 DEGENERATION OF LUMBAR OR LUMBOSACRAL INTERVERTEBRAL DISC: ICD-10-CM

## 2023-08-08 DIAGNOSIS — M48.062 PSEUDOCLAUDICATION SYNDROME: ICD-10-CM

## 2023-08-08 DIAGNOSIS — M54.16 LUMBAR RADICULOPATHY: ICD-10-CM

## 2023-08-08 PROCEDURE — 72148 MRI LUMBAR SPINE W/O DYE: CPT

## 2023-09-06 DIAGNOSIS — I25.10 CORONARY ARTERY DISEASE INVOLVING NATIVE CORONARY ARTERY OF NATIVE HEART WITHOUT ANGINA PECTORIS: ICD-10-CM

## 2023-09-06 RX ORDER — ROSUVASTATIN CALCIUM 10 MG/1
TABLET, COATED ORAL
Qty: 90 TABLET | Refills: 1 | Status: SHIPPED | OUTPATIENT
Start: 2023-09-06

## 2023-09-15 PROCEDURE — 93296 REM INTERROG EVL PM/IDS: CPT | Performed by: INTERNAL MEDICINE

## 2023-09-15 PROCEDURE — 93294 REM INTERROG EVL PM/LDLS PM: CPT | Performed by: INTERNAL MEDICINE

## 2023-10-04 ENCOUNTER — OFFICE VISIT (OUTPATIENT)
Dept: CARDIOLOGY CLINIC | Age: 78
End: 2023-10-04
Payer: MEDICARE

## 2023-10-04 VITALS
HEIGHT: 73 IN | BODY MASS INDEX: 28.39 KG/M2 | SYSTOLIC BLOOD PRESSURE: 128 MMHG | WEIGHT: 214.2 LBS | HEART RATE: 76 BPM | OXYGEN SATURATION: 96 % | RESPIRATION RATE: 16 BRPM | DIASTOLIC BLOOD PRESSURE: 70 MMHG

## 2023-10-04 DIAGNOSIS — I25.10 CORONARY ARTERY DISEASE INVOLVING NATIVE CORONARY ARTERY OF NATIVE HEART WITHOUT ANGINA PECTORIS: Primary | ICD-10-CM

## 2023-10-04 PROCEDURE — 93000 ELECTROCARDIOGRAM COMPLETE: CPT | Performed by: INTERNAL MEDICINE

## 2023-10-04 PROCEDURE — 1123F ACP DISCUSS/DSCN MKR DOCD: CPT | Performed by: INTERNAL MEDICINE

## 2023-10-04 PROCEDURE — 99214 OFFICE O/P EST MOD 30 MIN: CPT | Performed by: INTERNAL MEDICINE

## 2023-10-04 NOTE — PROGRESS NOTES
Stability Vital Sign     Unstable Housing in the Last Year: No       Current Outpatient Medications   Medication Sig Dispense Refill    rosuvastatin (CRESTOR) 10 MG tablet TAKE 1 TABLET BY MOUTH DAILY 90 tablet 1    amLODIPine (NORVASC) 5 MG tablet TAKE 1 TABLET IN THE MORNING 90 tablet 3    dofetilide (TIKOSYN) 500 MCG capsule TAKE 1 CAPSULE BY MOUTH EVERY 12 HOURS 180 capsule 1    apixaban (ELIQUIS) 5 MG TABS tablet TAKE 1 TABLET TWICE A  tablet 3    metoprolol succinate (TOPROL XL) 25 MG extended release tablet TAKE 1 TABLET TWICE A  tablet 3    clopidogrel (PLAVIX) 75 MG tablet TAKE 1 TABLET DAILY 90 tablet 3    acetaminophen (TYLENOL) 500 MG tablet Take 1 tablet by mouth as needed for Pain      Multiple Vitamins-Minerals (CENTRUM SILVER ADULT 50+) TABS Take 1 tablet by mouth daily      Omega-3 Fatty Acids (FISH OIL EXTRA STRENGTH PO) Take 1,000 mg by mouth daily       Specialty Vitamins Products (PROSTATE PO) Take by mouth daily       No current facility-administered medications for this visit. No Known Allergies    Chief Complaint:  Sahil Guzman is here today for follow up and management/recomendations for coronary artery disease. History of Present Illness: Sahil Guzman states that he goes up flights of stairs, go shopping, and does household chores. He also plays a lot of golf. He denies any chest discomfort or dyspnea with any of these activities. He denies any orthopnea/PND, or lower extremity edema. He denies any palpitations or presyncopal symptoms. REVIEW OF SYSTEMS:  As above. Patient does not complain of any fever, chills, nausea, vomiting or diarrhea. No focal, motor or neurological deficits. No changes in his/her vision, hearing, bowel or bladder habits. He is not known to have a history of thyroid problems. No recent nose bleeds.     PHYSICAL EXAM:  Vitals:    10/04/23 1320   BP: 128/70   Pulse: 76   Resp: 16   SpO2: 96%   Weight: 214 lb 3.2 oz (97.2 kg)   Height: 6'

## 2023-11-09 DIAGNOSIS — I48.0 PAF (PAROXYSMAL ATRIAL FIBRILLATION) (HCC): ICD-10-CM

## 2023-11-09 DIAGNOSIS — I48.0 PAF (PAROXYSMAL ATRIAL FIBRILLATION) (HCC): Primary | ICD-10-CM

## 2023-11-09 RX ORDER — DOFETILIDE 0.5 MG/1
CAPSULE ORAL
Qty: 60 CAPSULE | Refills: 0 | Status: SHIPPED | OUTPATIENT
Start: 2023-11-09

## 2023-11-29 ENCOUNTER — OFFICE VISIT (OUTPATIENT)
Dept: NON INVASIVE DIAGNOSTICS | Age: 78
End: 2023-11-29
Payer: MEDICARE

## 2023-11-29 VITALS
RESPIRATION RATE: 16 BRPM | SYSTOLIC BLOOD PRESSURE: 122 MMHG | WEIGHT: 223.6 LBS | BODY MASS INDEX: 30.28 KG/M2 | HEIGHT: 72 IN | DIASTOLIC BLOOD PRESSURE: 72 MMHG | HEART RATE: 71 BPM

## 2023-11-29 DIAGNOSIS — I48.0 PAF (PAROXYSMAL ATRIAL FIBRILLATION) (HCC): Primary | ICD-10-CM

## 2023-11-29 DIAGNOSIS — I48.0 PAF (PAROXYSMAL ATRIAL FIBRILLATION) (HCC): ICD-10-CM

## 2023-11-29 DIAGNOSIS — Z79.01 CHRONIC ANTICOAGULATION: ICD-10-CM

## 2023-11-29 DIAGNOSIS — Z79.899 VISIT FOR MONITORING TIKOSYN THERAPY: ICD-10-CM

## 2023-11-29 DIAGNOSIS — Z51.81 VISIT FOR MONITORING TIKOSYN THERAPY: ICD-10-CM

## 2023-11-29 DIAGNOSIS — I44.1 MOBITZ (TYPE) I (WENCKEBACH'S) ATRIOVENTRICULAR BLOCK: ICD-10-CM

## 2023-11-29 DIAGNOSIS — Z95.0 PACEMAKER: ICD-10-CM

## 2023-11-29 LAB
ANION GAP SERPL CALCULATED.3IONS-SCNC: 13 MMOL/L (ref 7–16)
BUN BLDV-MCNC: 24 MG/DL (ref 6–23)
CALCIUM SERPL-MCNC: 9.4 MG/DL (ref 8.6–10.2)
CHLORIDE BLD-SCNC: 109 MMOL/L (ref 98–107)
CO2: 23 MMOL/L (ref 22–29)
CREAT SERPL-MCNC: 0.9 MG/DL (ref 0.7–1.2)
GFR SERPL CREATININE-BSD FRML MDRD: >60 ML/MIN/1.73M2
GLUCOSE BLD-MCNC: 103 MG/DL (ref 74–99)
HCT VFR BLD CALC: 43.3 % (ref 37–54)
HEMOGLOBIN: 14.5 G/DL (ref 12.5–16.5)
MAGNESIUM: 1.9 MG/DL (ref 1.6–2.6)
MCH RBC QN AUTO: 31.4 PG (ref 26–35)
MCHC RBC AUTO-ENTMCNC: 33.5 G/DL (ref 32–34.5)
MCV RBC AUTO: 93.7 FL (ref 80–99.9)
PDW BLD-RTO: 12.9 % (ref 11.5–15)
PLATELET # BLD: 197 K/UL (ref 130–450)
PMV BLD AUTO: 9.1 FL (ref 7–12)
POTASSIUM SERPL-SCNC: 5.3 MMOL/L (ref 3.5–5)
RBC # BLD: 4.62 M/UL (ref 3.8–5.8)
SODIUM BLD-SCNC: 145 MMOL/L (ref 132–146)
WBC # BLD: 5.9 K/UL (ref 4.5–11.5)

## 2023-11-29 PROCEDURE — 93280 PM DEVICE PROGR EVAL DUAL: CPT | Performed by: NURSE PRACTITIONER

## 2023-11-29 PROCEDURE — 1123F ACP DISCUSS/DSCN MKR DOCD: CPT | Performed by: NURSE PRACTITIONER

## 2023-11-29 PROCEDURE — 99214 OFFICE O/P EST MOD 30 MIN: CPT | Performed by: NURSE PRACTITIONER

## 2023-11-29 PROCEDURE — 93000 ELECTROCARDIOGRAM COMPLETE: CPT | Performed by: NURSE PRACTITIONER

## 2023-11-30 ENCOUNTER — TELEPHONE (OUTPATIENT)
Dept: NON INVASIVE DIAGNOSTICS | Age: 78
End: 2023-11-30

## 2023-11-30 NOTE — TELEPHONE ENCOUNTER
Pt notified of results and verbalized understanding.     Electronically signed by Ailyn Javier MA on 11/30/2023 at 8:45 AM

## 2023-11-30 NOTE — TELEPHONE ENCOUNTER
----- Message from Sanjay Gill MD sent at 11/29/2023  5:13 PM EST -----  Potassium is slightly high. Needs to follow up with PCP.  Thanks.  ----- Message -----  From: [id number] - [facility] Incoming Lab Results From [place]  Sent: 11/29/2023   5:10 PM EST  To: Sanjay Gill MD

## 2023-12-04 ENCOUNTER — TELEPHONE (OUTPATIENT)
Dept: PRIMARY CARE CLINIC | Age: 78
End: 2023-12-04

## 2023-12-04 NOTE — TELEPHONE ENCOUNTER
Pt called stating that he had blood work done for his cardiologist and they told him that his potassium was high. Said to notify PCP and please advise with any new orders or suggestions.  Thank you

## 2023-12-04 NOTE — TELEPHONE ENCOUNTER
Let him know that this is something we see sometimes on the labs. But since he is not on any potassium medications that would elevate his potassium nothing needed to be done here. This is mostly just a finding from the lab that we can see it from time to time.

## 2023-12-15 PROCEDURE — 93296 REM INTERROG EVL PM/IDS: CPT | Performed by: INTERNAL MEDICINE

## 2023-12-15 PROCEDURE — 93294 REM INTERROG EVL PM/LDLS PM: CPT | Performed by: INTERNAL MEDICINE

## 2024-01-22 DIAGNOSIS — I48.0 PAF (PAROXYSMAL ATRIAL FIBRILLATION) (HCC): ICD-10-CM

## 2024-01-22 RX ORDER — DOFETILIDE 0.5 MG/1
CAPSULE ORAL
Qty: 180 CAPSULE | Refills: 1 | Status: SHIPPED | OUTPATIENT
Start: 2024-01-22

## 2024-01-29 RX ORDER — CLOPIDOGREL BISULFATE 75 MG/1
TABLET ORAL
Qty: 90 TABLET | Refills: 3 | Status: SHIPPED | OUTPATIENT
Start: 2024-01-29

## 2024-01-31 DIAGNOSIS — I48.0 PAF (PAROXYSMAL ATRIAL FIBRILLATION) (HCC): ICD-10-CM

## 2024-01-31 RX ORDER — DOFETILIDE 0.5 MG/1
CAPSULE ORAL
Qty: 180 CAPSULE | Refills: 1 | OUTPATIENT
Start: 2024-01-31

## 2024-04-03 DIAGNOSIS — I48.0 PAF (PAROXYSMAL ATRIAL FIBRILLATION) (HCC): ICD-10-CM

## 2024-04-03 DIAGNOSIS — I25.10 CORONARY ARTERY DISEASE INVOLVING NATIVE CORONARY ARTERY OF NATIVE HEART WITHOUT ANGINA PECTORIS: ICD-10-CM

## 2024-04-03 RX ORDER — ROSUVASTATIN CALCIUM 10 MG/1
TABLET, COATED ORAL
Qty: 90 TABLET | Refills: 0 | Status: SHIPPED | OUTPATIENT
Start: 2024-04-03

## 2024-04-04 RX ORDER — METOPROLOL SUCCINATE 25 MG/1
TABLET, EXTENDED RELEASE ORAL
Qty: 180 TABLET | Refills: 3 | Status: SHIPPED | OUTPATIENT
Start: 2024-04-04

## 2024-05-21 SDOH — HEALTH STABILITY: PHYSICAL HEALTH: ON AVERAGE, HOW MANY MINUTES DO YOU ENGAGE IN EXERCISE AT THIS LEVEL?: 150+ MIN

## 2024-05-21 SDOH — ECONOMIC STABILITY: FOOD INSECURITY: WITHIN THE PAST 12 MONTHS, YOU WORRIED THAT YOUR FOOD WOULD RUN OUT BEFORE YOU GOT MONEY TO BUY MORE.: NEVER TRUE

## 2024-05-21 SDOH — ECONOMIC STABILITY: FOOD INSECURITY: WITHIN THE PAST 12 MONTHS, THE FOOD YOU BOUGHT JUST DIDN'T LAST AND YOU DIDN'T HAVE MONEY TO GET MORE.: NEVER TRUE

## 2024-05-21 SDOH — HEALTH STABILITY: PHYSICAL HEALTH: ON AVERAGE, HOW MANY DAYS PER WEEK DO YOU ENGAGE IN MODERATE TO STRENUOUS EXERCISE (LIKE A BRISK WALK)?: 3 DAYS

## 2024-05-21 SDOH — ECONOMIC STABILITY: TRANSPORTATION INSECURITY
IN THE PAST 12 MONTHS, HAS LACK OF TRANSPORTATION KEPT YOU FROM MEETINGS, WORK, OR FROM GETTING THINGS NEEDED FOR DAILY LIVING?: NO

## 2024-05-21 SDOH — ECONOMIC STABILITY: INCOME INSECURITY: HOW HARD IS IT FOR YOU TO PAY FOR THE VERY BASICS LIKE FOOD, HOUSING, MEDICAL CARE, AND HEATING?: NOT HARD AT ALL

## 2024-05-21 ASSESSMENT — PATIENT HEALTH QUESTIONNAIRE - PHQ9
2. FEELING DOWN, DEPRESSED OR HOPELESS: NOT AT ALL
SUM OF ALL RESPONSES TO PHQ QUESTIONS 1-9: 0
SUM OF ALL RESPONSES TO PHQ9 QUESTIONS 1 & 2: 0
1. LITTLE INTEREST OR PLEASURE IN DOING THINGS: NOT AT ALL

## 2024-05-21 ASSESSMENT — LIFESTYLE VARIABLES
HOW OFTEN DO YOU HAVE A DRINK CONTAINING ALCOHOL: MONTHLY OR LESS
HOW OFTEN DO YOU HAVE SIX OR MORE DRINKS ON ONE OCCASION: 1
HOW MANY STANDARD DRINKS CONTAINING ALCOHOL DO YOU HAVE ON A TYPICAL DAY: 1 OR 2
HOW MANY STANDARD DRINKS CONTAINING ALCOHOL DO YOU HAVE ON A TYPICAL DAY: 1
HOW OFTEN DO YOU HAVE A DRINK CONTAINING ALCOHOL: 2

## 2024-05-22 NOTE — PROGRESS NOTES
[facility]--6/2020:  1. Sinus rhythm/sinus bradycardia.   2. Intermittent Mobitz I and Mobitz II AVB.   3. 5 beats of NSVT.   4. No AF or SVT.     C 10/3/17: Left main:  Short.  No angiographically significant stenosis.  LAD:  Very tortous vessel, mid, eccentric, 80-85% diffuse stenosis right where the second diagonal takes off.  D1:  No angiographically significant stenosis. D2:  :Large vessel.  No angiographically significant stenosis. Circumflex:  No angiographically significant stenosis.  OM1:  Tiny vessel. OM2:  Tiny vessel. OM3:  Large vessel.  No angiographically significant stenosis. Right coronary artery:  Dominant vessel.  Proximal diffuse 30% stenosis. Left ventriculogram:  Normal LV size and systolic function.  No wall motion abnormalities.  Ejection fraction of 60-65%.     Device Interrogation: 5/23/24   Make/Model Existence Before [employer] MRI  Mode DDDR 70/130  Battery Voltage/Longevity: 9 years    Pacing: A: 75%  RV: 100%    P wave: 8 mV  Impedance: 674 ohms   Threshold: 0.5 V @ 0.4 ms  RV R wave: 8.9 Impedance: 620 ohms   Threshold: 0.9 V @ 0.4 ms  Episodes: AF burden: <1%.  -   Reprogramming included: see below  Overall device function is normal     All device programmable settings were evaluated per above and in the scanned document, along with iterative adjustments (capture thresholds) to assess and select the most appropriate final programming to provide for consistent delivery of the appropriate therapy and to verify function of the device.      EKG 5/23/24 : Normal sinus rhythm with A sensed V paced 71 bpm, QTc 486 ms - see scanned cardiology    Assessment and plan:    1. Persistent atrial fibrillation  - [id number] of 4 (age, HTN, CAD)  - PAF on 10/6/20; Started on Eliquis 5 mg BID on 10/8/20.   - Noted to be in persistent AF since June 2021 and subsequently underwent DC-cardioversion in October 2021.   - Noted to have recurrent AF in November 2021 and Tikosyn initiation on 11/29/21.  - On

## 2024-05-23 ENCOUNTER — OFFICE VISIT (OUTPATIENT)
Dept: NON INVASIVE DIAGNOSTICS | Age: 79
End: 2024-05-23
Payer: MEDICARE

## 2024-05-23 VITALS
RESPIRATION RATE: 18 BRPM | SYSTOLIC BLOOD PRESSURE: 120 MMHG | DIASTOLIC BLOOD PRESSURE: 70 MMHG | HEIGHT: 71 IN | WEIGHT: 212.6 LBS | BODY MASS INDEX: 29.76 KG/M2 | HEART RATE: 71 BPM

## 2024-05-23 DIAGNOSIS — Z51.81 VISIT FOR MONITORING TIKOSYN THERAPY: ICD-10-CM

## 2024-05-23 DIAGNOSIS — Z79.899 VISIT FOR MONITORING TIKOSYN THERAPY: ICD-10-CM

## 2024-05-23 DIAGNOSIS — Z95.0 PACEMAKER: ICD-10-CM

## 2024-05-23 DIAGNOSIS — I48.19 PERSISTENT ATRIAL FIBRILLATION (HCC): ICD-10-CM

## 2024-05-23 DIAGNOSIS — I48.0 PAF (PAROXYSMAL ATRIAL FIBRILLATION) (HCC): Primary | ICD-10-CM

## 2024-05-23 PROCEDURE — 1036F TOBACCO NON-USER: CPT | Performed by: INTERNAL MEDICINE

## 2024-05-23 PROCEDURE — 99215 OFFICE O/P EST HI 40 MIN: CPT | Performed by: INTERNAL MEDICINE

## 2024-05-23 PROCEDURE — G8427 DOCREV CUR MEDS BY ELIG CLIN: HCPCS | Performed by: INTERNAL MEDICINE

## 2024-05-23 PROCEDURE — 93280 PM DEVICE PROGR EVAL DUAL: CPT | Performed by: INTERNAL MEDICINE

## 2024-05-23 PROCEDURE — 1123F ACP DISCUSS/DSCN MKR DOCD: CPT | Performed by: INTERNAL MEDICINE

## 2024-05-23 PROCEDURE — G8417 CALC BMI ABV UP PARAM F/U: HCPCS | Performed by: INTERNAL MEDICINE

## 2024-05-24 ENCOUNTER — OFFICE VISIT (OUTPATIENT)
Dept: PRIMARY CARE CLINIC | Age: 79
End: 2024-05-24
Payer: MEDICARE

## 2024-05-24 VITALS
HEART RATE: 70 BPM | RESPIRATION RATE: 18 BRPM | TEMPERATURE: 97.7 F | HEIGHT: 71 IN | DIASTOLIC BLOOD PRESSURE: 78 MMHG | BODY MASS INDEX: 29.68 KG/M2 | OXYGEN SATURATION: 94 % | SYSTOLIC BLOOD PRESSURE: 116 MMHG | WEIGHT: 212 LBS

## 2024-05-24 DIAGNOSIS — R31.0 GROSS HEMATURIA: ICD-10-CM

## 2024-05-24 DIAGNOSIS — Z00.00 MEDICARE ANNUAL WELLNESS VISIT, SUBSEQUENT: Primary | ICD-10-CM

## 2024-05-24 DIAGNOSIS — E78.2 MIXED HYPERLIPIDEMIA: ICD-10-CM

## 2024-05-24 DIAGNOSIS — R10.9 FLANK PAIN: ICD-10-CM

## 2024-05-24 DIAGNOSIS — I25.10 CORONARY ARTERY DISEASE INVOLVING NATIVE CORONARY ARTERY OF NATIVE HEART WITHOUT ANGINA PECTORIS: ICD-10-CM

## 2024-05-24 LAB
BILIRUBIN, POC: ABNORMAL
BLOOD URINE, POC: ABNORMAL
CLARITY, POC: CLEAR
COLOR, POC: YELLOW
GLUCOSE URINE, POC: ABNORMAL
KETONES, POC: ABNORMAL
LEUKOCYTE EST, POC: ABNORMAL
NITRITE, POC: ABNORMAL
PH, POC: 5
PROTEIN, POC: ABNORMAL
SPECIFIC GRAVITY, POC: 1.03
UROBILINOGEN, POC: 0.2

## 2024-05-24 PROCEDURE — G8427 DOCREV CUR MEDS BY ELIG CLIN: HCPCS | Performed by: FAMILY MEDICINE

## 2024-05-24 PROCEDURE — G8417 CALC BMI ABV UP PARAM F/U: HCPCS | Performed by: FAMILY MEDICINE

## 2024-05-24 PROCEDURE — G0439 PPPS, SUBSEQ VISIT: HCPCS | Performed by: FAMILY MEDICINE

## 2024-05-24 PROCEDURE — 1036F TOBACCO NON-USER: CPT | Performed by: FAMILY MEDICINE

## 2024-05-24 PROCEDURE — 1123F ACP DISCUSS/DSCN MKR DOCD: CPT | Performed by: FAMILY MEDICINE

## 2024-05-24 PROCEDURE — 81002 URINALYSIS NONAUTO W/O SCOPE: CPT | Performed by: FAMILY MEDICINE

## 2024-05-24 PROCEDURE — 99212 OFFICE O/P EST SF 10 MIN: CPT | Performed by: FAMILY MEDICINE

## 2024-05-24 RX ORDER — NITROFURANTOIN 25; 75 MG/1; MG/1
100 CAPSULE ORAL 2 TIMES DAILY
Qty: 14 CAPSULE | Refills: 0 | Status: SHIPPED | OUTPATIENT
Start: 2024-05-24 | End: 2024-05-31

## 2024-05-24 ASSESSMENT — ENCOUNTER SYMPTOMS
SHORTNESS OF BREATH: 0
CHEST TIGHTNESS: 0

## 2024-05-24 NOTE — PROGRESS NOTES
Future    Coronary artery disease involving native coronary artery of native heart without angina pectoris  -     CBC; Future    Gross hematuria  -     nitrofurantoin, macrocrystal-monohydrate, (MACROBID) 100 MG capsule; Take 1 capsule by mouth 2 times daily for 7 days    - ldl goal < 55. Consider increasing Crestor to 40 mg if need be.   - push fluids.   - low chol diet and med.  -I called patient after UA results returned and we will start him on Macrobid twice a day for 1 week due to moderate amount of blood seen in his urine.  I want to recheck a urinalysis in 10 to 14 days and if blood still present we will order a culture on this.    Return in about 1 year (around 5/24/2025) for or for acute problem.    Bart Sy,           Patient's complete Health Risk Assessment and screening values have been reviewed and are found in Flowsheets. The following problems were reviewed today and where indicated follow up appointments were made and/or referrals ordered.                     Hearing Screen:  Do you or your family notice any trouble with your hearing that hasn't been managed with hearing aids?: (!) Yes    Interventions:  Patient comments: [facility] to [facility] to get [facility]  needs hearing aids.                Objective   Vitals:    05/24/24 1503   BP: 116/78   Site: Left Upper Arm   Position: Sitting   Pulse: 70   Resp: 18   Temp: 97.7 °F (36.5 °C)   SpO2: 94%   Weight: 96.2 kg (212 lb)   Height: 1.803 m (5' 10.98\")      Body mass index is 29.58 kg/m².      General Appearance: alert and oriented to person, place and time, well developed and well- nourished, in no acute distress  Skin: warm and dry, no rash or erythema  Head: normocephalic and atraumatic  Eyes: pupils equal, round, and reactive to light, extraocular eye movements intact, conjunctivae normal  ENT: tympanic membrane, external ear and ear canal normal bilaterally, nose without deformity, nasal mucosa and turbinates normal

## 2024-05-28 ENCOUNTER — TELEPHONE (OUTPATIENT)
Dept: PRIMARY CARE CLINIC | Age: 79
End: 2024-05-28

## 2024-05-28 DIAGNOSIS — E78.2 MIXED HYPERLIPIDEMIA: ICD-10-CM

## 2024-05-28 DIAGNOSIS — I25.10 CORONARY ARTERY DISEASE INVOLVING NATIVE CORONARY ARTERY OF NATIVE HEART WITHOUT ANGINA PECTORIS: ICD-10-CM

## 2024-05-28 DIAGNOSIS — Z79.899 VISIT FOR MONITORING TIKOSYN THERAPY: ICD-10-CM

## 2024-05-28 DIAGNOSIS — R31.0 GROSS HEMATURIA: ICD-10-CM

## 2024-05-28 DIAGNOSIS — Z51.81 VISIT FOR MONITORING TIKOSYN THERAPY: ICD-10-CM

## 2024-05-28 LAB
ALBUMIN: 4.4 G/DL (ref 3.5–5.2)
ALP BLD-CCNC: 65 U/L (ref 40–129)
ALT SERPL-CCNC: 16 U/L (ref 0–40)
ANION GAP SERPL CALCULATED.3IONS-SCNC: 14 MMOL/L (ref 7–16)
AST SERPL-CCNC: 25 U/L (ref 0–39)
BILIRUB SERPL-MCNC: 0.5 MG/DL (ref 0–1.2)
BUN BLDV-MCNC: 18 MG/DL (ref 6–23)
CALCIUM SERPL-MCNC: 8.9 MG/DL (ref 8.6–10.2)
CHLORIDE BLD-SCNC: 109 MMOL/L (ref 98–107)
CHOLESTEROL, TOTAL: 127 MG/DL
CO2: 20 MMOL/L (ref 22–29)
CREAT SERPL-MCNC: 1 MG/DL (ref 0.7–1.2)
GFR, ESTIMATED: 79 ML/MIN/1.73M2
GLUCOSE BLD-MCNC: 94 MG/DL (ref 74–99)
HCT VFR BLD CALC: 45.1 % (ref 37–54)
HDLC SERPL-MCNC: 43 MG/DL
HEMOGLOBIN: 15.3 G/DL (ref 12.5–16.5)
LDL CHOLESTEROL: 71 MG/DL
MAGNESIUM: 2.1 MG/DL (ref 1.6–2.6)
MCH RBC QN AUTO: 32 PG (ref 26–35)
MCHC RBC AUTO-ENTMCNC: 33.9 G/DL (ref 32–34.5)
MCV RBC AUTO: 94.4 FL (ref 80–99.9)
PDW BLD-RTO: 13 % (ref 11.5–15)
PLATELET # BLD: 190 K/UL (ref 130–450)
PMV BLD AUTO: 9.2 FL (ref 7–12)
POTASSIUM SERPL-SCNC: 4.7 MMOL/L (ref 3.5–5)
RBC # BLD: 4.78 M/UL (ref 3.8–5.8)
SODIUM BLD-SCNC: 143 MMOL/L (ref 132–146)
TOTAL PROTEIN: 6.9 G/DL (ref 6.4–8.3)
TRIGL SERPL-MCNC: 67 MG/DL
VLDLC SERPL CALC-MCNC: 13 MG/DL
WBC # BLD: 6.1 K/UL (ref 4.5–11.5)

## 2024-05-28 RX ORDER — NITROFURANTOIN 25; 75 MG/1; MG/1
100 CAPSULE ORAL 2 TIMES DAILY
Qty: 14 CAPSULE | Refills: 0 | Status: SHIPPED | OUTPATIENT
Start: 2024-05-28 | End: 2024-06-04

## 2024-05-28 RX ORDER — NITROFURANTOIN 25; 75 MG/1; MG/1
100 CAPSULE ORAL 2 TIMES DAILY
Qty: 14 CAPSULE | Refills: 0 | Status: CANCELLED | OUTPATIENT
Start: 2024-05-28 | End: 2024-06-04

## 2024-05-28 NOTE — TELEPHONE ENCOUNTER
Pt needed his antibiotic Maccrobid 100 Mg sent to [facility] in [place].

## 2024-05-30 ENCOUNTER — TELEPHONE (OUTPATIENT)
Dept: PRIMARY CARE CLINIC | Age: 79
End: 2024-05-30

## 2024-05-30 NOTE — TELEPHONE ENCOUNTER
Spoke with patient we will increase Crestor from 10 mg up to 20 mg to try to drive his LDL below 55.  It currently is at 71.  All other labs doing great

## 2024-06-20 DIAGNOSIS — R31.0 GROSS HEMATURIA: Primary | ICD-10-CM

## 2024-06-20 LAB
APPEARANCE FLUID: NORMAL
BILIRUBIN, POC: NORMAL
BLOOD URINE, POC: NORMAL
CLARITY, POC: NORMAL
COLOR, POC: YELLOW
GLUCOSE URINE, POC: NORMAL
KETONES, POC: NORMAL
LEUKOCYTE EST, POC: NORMAL
NITRITE, POC: NORMAL
PH, POC: 5
PROTEIN, POC: NORMAL
SPECIFIC GRAVITY, POC: 1.03
UROBILINOGEN, POC: 0.2

## 2024-06-20 PROCEDURE — 81002 URINALYSIS NONAUTO W/O SCOPE: CPT | Performed by: FAMILY MEDICINE

## 2024-06-24 ENCOUNTER — TELEPHONE (OUTPATIENT)
Dept: PRIMARY CARE CLINIC | Age: 79
End: 2024-06-24

## 2024-06-24 DIAGNOSIS — R31.21 ASYMPTOMATIC MICROSCOPIC HEMATURIA: Primary | ICD-10-CM

## 2024-06-24 NOTE — TELEPHONE ENCOUNTER
Spoke with patient still having microscopic moderate blood in his urine.  He at one time was a smoker and a .  I want to recheck his urine in 1 week with a urine culture as well.  
Instructions: This plan will send the code FBSD to the PM system.  DO NOT or CHANGE the price.
Detail Level: Simple
Price (Do Not Change): 0.00

## 2024-07-01 DIAGNOSIS — I48.0 PAF (PAROXYSMAL ATRIAL FIBRILLATION) (HCC): ICD-10-CM

## 2024-07-01 DIAGNOSIS — R31.21 ASYMPTOMATIC MICROSCOPIC HEMATURIA: ICD-10-CM

## 2024-07-01 LAB
BILIRUBIN, URINE: NEGATIVE
COLOR, UA: YELLOW
GLUCOSE URINE: NEGATIVE MG/DL
KETONES, URINE: NEGATIVE MG/DL
LEUKOCYTE ESTERASE, URINE: ABNORMAL
NITRITE, URINE: NEGATIVE
PH, URINE: 5.5 (ref 5–9)
PROTEIN UA: ABNORMAL MG/DL
RBC UA: ABNORMAL /HPF
SPECIFIC GRAVITY UA: >1.03 (ref 1–1.03)
TURBIDITY: CLEAR
URINE HGB: ABNORMAL
UROBILINOGEN, URINE: 0.2 EU/DL (ref 0–1)
WBC UA: ABNORMAL /HPF

## 2024-07-01 RX ORDER — ROSUVASTATIN CALCIUM 20 MG/1
20 TABLET, COATED ORAL DAILY
Qty: 90 TABLET | Refills: 1 | Status: SHIPPED | OUTPATIENT
Start: 2024-07-01

## 2024-07-01 RX ORDER — METOPROLOL SUCCINATE 25 MG/1
TABLET, EXTENDED RELEASE ORAL
Qty: 180 TABLET | Refills: 3 | Status: SHIPPED | OUTPATIENT
Start: 2024-07-01

## 2024-07-03 LAB
CULTURE: NO GROWTH
SPECIMEN DESCRIPTION: NORMAL

## 2024-07-08 ENCOUNTER — TELEPHONE (OUTPATIENT)
Dept: PRIMARY CARE CLINIC | Age: 79
End: 2024-07-08

## 2024-07-08 NOTE — TELEPHONE ENCOUNTER
Left message with patient's wife that his urine culture was normal.  Therefore we will recheck a point-of-care urinalysis in 2 weeks.  Lab appointment only needed to see if there is still any blood microscopically in his urine.  Further treatment options based on what this shows in 2 weeks.  Lab appointment only needed

## 2024-07-12 DIAGNOSIS — I48.0 PAF (PAROXYSMAL ATRIAL FIBRILLATION) (HCC): ICD-10-CM

## 2024-07-12 RX ORDER — DOFETILIDE 0.5 MG/1
CAPSULE ORAL
Qty: 180 CAPSULE | Refills: 1 | Status: SHIPPED | OUTPATIENT
Start: 2024-07-12

## 2024-07-23 ENCOUNTER — NURSE ONLY (OUTPATIENT)
Dept: PRIMARY CARE CLINIC | Age: 79
End: 2024-07-23
Payer: MEDICARE

## 2024-07-23 ENCOUNTER — TELEPHONE (OUTPATIENT)
Dept: PRIMARY CARE CLINIC | Age: 79
End: 2024-07-23

## 2024-07-23 DIAGNOSIS — R31.9 HEMATURIA, UNSPECIFIED TYPE: Primary | ICD-10-CM

## 2024-07-23 DIAGNOSIS — R31.21 ASYMPTOMATIC MICROSCOPIC HEMATURIA: ICD-10-CM

## 2024-07-23 LAB
APPEARANCE FLUID: NORMAL
BILIRUBIN, POC: NORMAL
BLOOD URINE, POC: NORMAL
CLARITY, POC: NORMAL
COLOR, POC: NORMAL
GLUCOSE URINE, POC: NORMAL
KETONES, POC: NORMAL
LEUKOCYTE EST, POC: NORMAL
NITRITE, POC: NORMAL
PH, POC: 5.5
PROTEIN, POC: 30
SPECIFIC GRAVITY, POC: >=1.03
UROBILINOGEN, POC: 0.2

## 2024-07-23 PROCEDURE — 81002 URINALYSIS NONAUTO W/O SCOPE: CPT | Performed by: FAMILY MEDICINE

## 2024-07-23 NOTE — TELEPHONE ENCOUNTER
Spoke with patient his urinalysis again shows moderate amount of blood in his urine.  He denies seeing any blood in his urine at the present time.  Due to the continuous nature of this we will now refer him to urology for further evaluation.

## 2024-09-18 RX ORDER — AMLODIPINE BESYLATE 5 MG/1
5 TABLET ORAL EVERY MORNING
Qty: 90 TABLET | Refills: 3 | Status: SHIPPED | OUTPATIENT
Start: 2024-09-18

## 2024-11-07 ENCOUNTER — OFFICE VISIT (OUTPATIENT)
Dept: NON INVASIVE DIAGNOSTICS | Age: 79
End: 2024-11-07

## 2024-11-07 VITALS
BODY MASS INDEX: 28.04 KG/M2 | WEIGHT: 207 LBS | OXYGEN SATURATION: 98 % | HEIGHT: 72 IN | DIASTOLIC BLOOD PRESSURE: 72 MMHG | SYSTOLIC BLOOD PRESSURE: 126 MMHG | TEMPERATURE: 97.3 F | RESPIRATION RATE: 18 BRPM | HEART RATE: 71 BPM

## 2024-11-07 DIAGNOSIS — I48.0 PAF (PAROXYSMAL ATRIAL FIBRILLATION) (HCC): Primary | ICD-10-CM

## 2024-11-07 DIAGNOSIS — Z79.01 CHRONIC ANTICOAGULATION: ICD-10-CM

## 2024-11-07 DIAGNOSIS — I48.19 PERSISTENT ATRIAL FIBRILLATION (HCC): ICD-10-CM

## 2024-11-07 DIAGNOSIS — Z95.0 PACEMAKER: ICD-10-CM

## 2024-11-07 DIAGNOSIS — I44.1 SECOND DEGREE AV BLOCK, MOBITZ TYPE II: ICD-10-CM

## 2024-11-07 LAB
ANION GAP SERPL CALCULATED.3IONS-SCNC: 9 MMOL/L (ref 7–16)
BUN BLDV-MCNC: 19 MG/DL (ref 6–23)
CALCIUM SERPL-MCNC: 9.2 MG/DL (ref 8.6–10.2)
CHLORIDE BLD-SCNC: 105 MMOL/L (ref 98–107)
CO2: 25 MMOL/L (ref 22–29)
CREAT SERPL-MCNC: 1.1 MG/DL (ref 0.7–1.2)
GFR, ESTIMATED: 69 ML/MIN/1.73M2
GLUCOSE BLD-MCNC: 117 MG/DL (ref 74–99)
HCT VFR BLD CALC: 41.9 % (ref 37–54)
HEMOGLOBIN: 13.7 G/DL (ref 12.5–16.5)
MAGNESIUM: 2.1 MG/DL (ref 1.6–2.6)
MCH RBC QN AUTO: 30.2 PG (ref 26–35)
MCHC RBC AUTO-ENTMCNC: 32.7 G/DL (ref 32–34.5)
MCV RBC AUTO: 92.3 FL (ref 80–99.9)
PDW BLD-RTO: 13.4 % (ref 11.5–15)
PLATELET # BLD: 215 K/UL (ref 130–450)
PMV BLD AUTO: 9.1 FL (ref 7–12)
POTASSIUM SERPL-SCNC: 4.7 MMOL/L (ref 3.5–5)
RBC # BLD: 4.54 M/UL (ref 3.8–5.8)
SODIUM BLD-SCNC: 139 MMOL/L (ref 132–146)
WBC # BLD: 7.2 K/UL (ref 4.5–11.5)

## 2024-11-07 RX ORDER — TAMSULOSIN HYDROCHLORIDE 0.4 MG/1
CAPSULE ORAL DAILY
COMMUNITY
Start: 2024-09-26

## 2024-11-07 NOTE — PROGRESS NOTES
Patient Labs drawn form Left arm   Labs ordered by Carmenza MARADIAGA  Patient tolerated well.   CBC, MAG.M BMP  Electronically signed by SRIDEVI CORDON MA on 11/7/2024 at 10:34 AM

## 2024-11-07 NOTE — PROGRESS NOTES
[facility] Electrophysiology  Outpatient Progress Note  Renzo Blas  1945  Date of Service: 11/7/2024  PCP: Bart Sy DO  Electrophysiologist: Dr. Gtz         Subjective: Renzo Blas is seen for follow-up and management of: PAF pacemaker    Last seen in the office with Dr. Gtz on 5/23/2024    PMH as noted below significant for second-degree AV block Mobitz 2 s/p dual-chamber pacemaker implant 9/23/2020.,  PAF, anticoagulated on Eliquis and managed on Tikosyn.    In October 2020, he was found to have AF and started on Eliquis.  In June 2021 he was in persistent AF and underwent cardioversion in October 2021.  He had recurrent AF in November of that year and was started on Tikosyn.  He continues to follow remotely.  He feels overall good from electrophysiology and cardiac standpoint.  Device site stable, itches at times but no complaints otherwise.    Some urine with bleeding today and in the past. Has had in the past and was started on Flomax in the past 1-2 months. Urology today to follow up. Sees Dr Camara.   Continues to [email or web address] and goes to [place] in winter.         Patient Active Problem List   Diagnosis    Hyperlipidemia    Tinnitus    Mobitz (type) I (Wenckebach's) atrioventricular block    Coronary artery disease due to lipid rich plaque    Second degree AV block, Mobitz type II    Pacemaker    PAF (paroxysmal atrial fibrillation) (HCC)    Persistent atrial fibrillation (HCC)       Current Outpatient Medications   Medication Sig Dispense Refill    tamsulosin (FLOMAX) 0.4 MG capsule Take by mouth daily      diphenhydrAMINE-APAP, sleep, (TYLENOL PM EXTRA STRENGTH PO) Take by mouth daily as needed      amLODIPine (NORVASC) 5 MG tablet TAKE 1 TABLET BY MOUTH IN THE  MORNING 90 tablet 3    dofetilide (TIKOSYN) 500 MCG capsule TAKE 1 CAPSULE BY MOUTH EVERY 12 HOURS 180 capsule 1    apixaban (ELIQUIS) 5 MG TABS tablet TAKE 1 TABLET BY

## 2024-11-07 NOTE — PROGRESS NOTES
[facility] PRE-ADMISSION TESTING INSTRUCTIONS    The Preadmission Testing patient is instructed accordingly using the following criteria (check applicable):    ARRIVAL INSTRUCTIONS:   Arrival Time: 0900    [x] Parking the day of Surgery is located in the Main Entrance lot.  Upon entering through the main entrance (Entrance A) make an immediate right to the surgery reception desk    [x] Bring photo ID and insurance card    [x] Please be sure to arrange for a responsible adult to provide transportation to and from the hospital    [x] Please arrange for a responsible adult to be with you for the 24 hour period post procedure, due to having anesthesia    [x] Please notify surgeon if you develop any illness between now and time of surgery (cold, cough, sore throat, fever, nausea, vomiting) or any signs of infections  including skin, wounds, and dental.    [x] If you awake am of surgery not feeling well or have temperature >100 please call [phone].    GENERAL INSTRUCTIONS:    [x] No solid foods after midnight, may have up to 8oz of water until 4 hours prior to surgery. No gum, no candy, no mints.  NPO time: 0700       [x] You may brush your teeth    [x] Take medications as instructed     [x] Stop herbal supplements and vitamins 5 days prior to procedure    [x] Follow preop dosing of blood thinners per physician instructions    [x] Shower or bath with soap, lather and rinse well, AM of Surgery, no lotion, powders or creams to surgical site    [x] Please do not wear any nail polish, make up, hair products, body spray, aftershave, cologne or perfume on the day of surgery    [x] Jewelry, body piercings, eyeglasses, contact lenses and dentures are not permitted into surgery (bring cases)    [x] No tobacco products within 24 hours of surgery     [x] No alcohol or illegal drug use, marijuana included, within 24 hours of surgery.    [x] You can expect a call the business day prior to procedure to

## 2024-11-10 ENCOUNTER — PREP FOR PROCEDURE (OUTPATIENT)
Dept: UROLOGY | Age: 79
End: 2024-11-10

## 2024-11-10 RX ORDER — SODIUM CHLORIDE 0.9 % (FLUSH) 0.9 %
5-40 SYRINGE (ML) INJECTION PRN
Status: CANCELLED | OUTPATIENT
Start: 2024-11-10

## 2024-11-10 RX ORDER — SODIUM CHLORIDE 0.9 % (FLUSH) 0.9 %
5-40 SYRINGE (ML) INJECTION EVERY 12 HOURS SCHEDULED
Status: CANCELLED | OUTPATIENT
Start: 2024-11-10

## 2024-11-10 RX ORDER — SODIUM CHLORIDE 9 MG/ML
INJECTION, SOLUTION INTRAVENOUS CONTINUOUS
Status: CANCELLED | OUTPATIENT
Start: 2024-11-10

## 2024-11-10 RX ORDER — SODIUM CHLORIDE 9 MG/ML
INJECTION, SOLUTION INTRAVENOUS PRN
Status: CANCELLED | OUTPATIENT
Start: 2024-11-10

## 2024-11-11 ENCOUNTER — HOSPITAL ENCOUNTER (OUTPATIENT)
Age: 79
Setting detail: OBSERVATION
Discharge: HOME | End: 2024-11-13
Attending: UROLOGY | Admitting: UROLOGY
Payer: MEDICARE

## 2024-11-11 ENCOUNTER — APPOINTMENT (OUTPATIENT)
Dept: GENERAL RADIOLOGY | Age: 79
End: 2024-11-11
Attending: UROLOGY
Payer: MEDICARE

## 2024-11-11 ENCOUNTER — ANESTHESIA EVENT (OUTPATIENT)
Dept: OPERATING ROOM | Age: 79
End: 2024-11-11
Payer: MEDICARE

## 2024-11-11 ENCOUNTER — ANESTHESIA (OUTPATIENT)
Dept: OPERATING ROOM | Age: 79
End: 2024-11-11
Payer: MEDICARE

## 2024-11-11 DIAGNOSIS — N40.1 BPH WITH OBSTRUCTION/LOWER URINARY TRACT SYMPTOMS: Primary | ICD-10-CM

## 2024-11-11 DIAGNOSIS — N13.8 BPH WITH OBSTRUCTION/LOWER URINARY TRACT SYMPTOMS: Primary | ICD-10-CM

## 2024-11-11 DIAGNOSIS — R31.0 GROSS HEMATURIA: ICD-10-CM

## 2024-11-11 PROCEDURE — 7100000000 HC PACU RECOVERY - FIRST 15 MIN: Performed by: UROLOGY

## 2024-11-11 PROCEDURE — 88342 IMHCHEM/IMCYTCHM 1ST ANTB: CPT

## 2024-11-11 PROCEDURE — 88305 TISSUE EXAM BY PATHOLOGIST: CPT

## 2024-11-11 PROCEDURE — 2709999900 HC NON-CHARGEABLE SUPPLY: Performed by: UROLOGY

## 2024-11-11 PROCEDURE — 6370000000 HC RX 637 (ALT 250 FOR IP): Performed by: NURSE PRACTITIONER

## 2024-11-11 PROCEDURE — 87077 CULTURE AEROBIC IDENTIFY: CPT

## 2024-11-11 PROCEDURE — 7100000001 HC PACU RECOVERY - ADDTL 15 MIN: Performed by: UROLOGY

## 2024-11-11 PROCEDURE — G0378 HOSPITAL OBSERVATION PER HR: HCPCS

## 2024-11-11 PROCEDURE — 2580000003 HC RX 258: Performed by: UROLOGY

## 2024-11-11 PROCEDURE — 6360000002 HC RX W HCPCS: Performed by: ANESTHESIOLOGY

## 2024-11-11 PROCEDURE — 6360000002 HC RX W HCPCS

## 2024-11-11 PROCEDURE — 6360000002 HC RX W HCPCS: Performed by: UROLOGY

## 2024-11-11 PROCEDURE — 6370000000 HC RX 637 (ALT 250 FOR IP): Performed by: UROLOGY

## 2024-11-11 PROCEDURE — C1769 GUIDE WIRE: HCPCS | Performed by: UROLOGY

## 2024-11-11 PROCEDURE — 88112 CYTOPATH CELL ENHANCE TECH: CPT

## 2024-11-11 PROCEDURE — 2580000003 HC RX 258: Performed by: NURSE ANESTHETIST, CERTIFIED REGISTERED

## 2024-11-11 PROCEDURE — 2500000003 HC RX 250 WO HCPCS: Performed by: NURSE ANESTHETIST, CERTIFIED REGISTERED

## 2024-11-11 PROCEDURE — 6360000002 HC RX W HCPCS: Performed by: NURSE ANESTHETIST, CERTIFIED REGISTERED

## 2024-11-11 PROCEDURE — 3700000000 HC ANESTHESIA ATTENDED CARE: Performed by: UROLOGY

## 2024-11-11 PROCEDURE — 3600000013 HC SURGERY LEVEL 3 ADDTL 15MIN: Performed by: UROLOGY

## 2024-11-11 PROCEDURE — 2580000003 HC RX 258

## 2024-11-11 PROCEDURE — 88341 IMHCHEM/IMCYTCHM EA ADD ANTB: CPT

## 2024-11-11 PROCEDURE — 3600000003 HC SURGERY LEVEL 3 BASE: Performed by: UROLOGY

## 2024-11-11 PROCEDURE — 87086 URINE CULTURE/COLONY COUNT: CPT

## 2024-11-11 PROCEDURE — 3700000001 HC ADD 15 MINUTES (ANESTHESIA): Performed by: UROLOGY

## 2024-11-11 PROCEDURE — 2720000010 HC SURG SUPPLY STERILE: Performed by: UROLOGY

## 2024-11-11 PROCEDURE — 74420 UROGRAPHY RTRGR +-KUB: CPT

## 2024-11-11 PROCEDURE — C1758 CATHETER, URETERAL: HCPCS | Performed by: UROLOGY

## 2024-11-11 RX ORDER — SODIUM CHLORIDE 9 MG/ML
INJECTION, SOLUTION INTRAVENOUS CONTINUOUS
Status: DISCONTINUED | OUTPATIENT
Start: 2024-11-11 | End: 2024-11-11

## 2024-11-11 RX ORDER — DOFETILIDE 0.5 MG/1
500 CAPSULE ORAL EVERY 12 HOURS SCHEDULED
Status: DISCONTINUED | OUTPATIENT
Start: 2024-11-11 | End: 2024-11-13 | Stop reason: HOSPADM

## 2024-11-11 RX ORDER — LIDOCAINE HYDROCHLORIDE 20 MG/ML
INJECTION, SOLUTION EPIDURAL; INFILTRATION; INTRACAUDAL; PERINEURAL
Status: DISCONTINUED | OUTPATIENT
Start: 2024-11-11 | End: 2024-11-11 | Stop reason: SDUPTHER

## 2024-11-11 RX ORDER — METOPROLOL SUCCINATE 25 MG/1
25 TABLET, EXTENDED RELEASE ORAL DAILY
Status: DISCONTINUED | OUTPATIENT
Start: 2024-11-11 | End: 2024-11-13 | Stop reason: HOSPADM

## 2024-11-11 RX ORDER — SODIUM CHLORIDE 0.9 % (FLUSH) 0.9 %
5-40 SYRINGE (ML) INJECTION EVERY 12 HOURS SCHEDULED
Status: DISCONTINUED | OUTPATIENT
Start: 2024-11-11 | End: 2024-11-13 | Stop reason: HOSPADM

## 2024-11-11 RX ORDER — FENTANYL CITRATE 50 UG/ML
INJECTION, SOLUTION INTRAMUSCULAR; INTRAVENOUS
Status: DISCONTINUED | OUTPATIENT
Start: 2024-11-11 | End: 2024-11-11 | Stop reason: SDUPTHER

## 2024-11-11 RX ORDER — ONDANSETRON 2 MG/ML
INJECTION INTRAMUSCULAR; INTRAVENOUS
Status: DISCONTINUED | OUTPATIENT
Start: 2024-11-11 | End: 2024-11-11 | Stop reason: SDUPTHER

## 2024-11-11 RX ORDER — SODIUM CHLORIDE 0.9 % (FLUSH) 0.9 %
5-40 SYRINGE (ML) INJECTION PRN
Status: DISCONTINUED | OUTPATIENT
Start: 2024-11-11 | End: 2024-11-13 | Stop reason: HOSPADM

## 2024-11-11 RX ORDER — MEPERIDINE HYDROCHLORIDE 25 MG/ML
12.5 INJECTION INTRAMUSCULAR; INTRAVENOUS; SUBCUTANEOUS PRN
Status: ACTIVE | OUTPATIENT
Start: 2024-11-11 | End: 2024-11-13

## 2024-11-11 RX ORDER — DOFETILIDE 0.12 MG/1
125 CAPSULE ORAL EVERY 12 HOURS SCHEDULED
Status: DISCONTINUED | OUTPATIENT
Start: 2024-11-11 | End: 2024-11-11

## 2024-11-11 RX ORDER — PROPOFOL 10 MG/ML
INJECTION, EMULSION INTRAVENOUS
Status: DISCONTINUED | OUTPATIENT
Start: 2024-11-11 | End: 2024-11-11 | Stop reason: SDUPTHER

## 2024-11-11 RX ORDER — OXYCODONE AND ACETAMINOPHEN 5; 325 MG/1; MG/1
1 TABLET ORAL EVERY 4 HOURS PRN
Status: DISCONTINUED | OUTPATIENT
Start: 2024-11-11 | End: 2024-11-13 | Stop reason: HOSPADM

## 2024-11-11 RX ORDER — MAGNESIUM SULFATE IN WATER 40 MG/ML
2000 INJECTION, SOLUTION INTRAVENOUS PRN
Status: DISCONTINUED | OUTPATIENT
Start: 2024-11-11 | End: 2024-11-13 | Stop reason: HOSPADM

## 2024-11-11 RX ORDER — POLYETHYLENE GLYCOL 3350 17 G/17G
17 POWDER, FOR SOLUTION ORAL DAILY PRN
Status: DISCONTINUED | OUTPATIENT
Start: 2024-11-11 | End: 2024-11-13 | Stop reason: HOSPADM

## 2024-11-11 RX ORDER — POTASSIUM CHLORIDE 1500 MG/1
40 TABLET, EXTENDED RELEASE ORAL PRN
Status: DISCONTINUED | OUTPATIENT
Start: 2024-11-11 | End: 2024-11-13 | Stop reason: HOSPADM

## 2024-11-11 RX ORDER — AMLODIPINE BESYLATE 5 MG/1
5 TABLET ORAL EVERY MORNING
Status: DISCONTINUED | OUTPATIENT
Start: 2024-11-12 | End: 2024-11-13 | Stop reason: HOSPADM

## 2024-11-11 RX ORDER — ONDANSETRON 2 MG/ML
4 INJECTION INTRAMUSCULAR; INTRAVENOUS EVERY 6 HOURS PRN
Status: DISCONTINUED | OUTPATIENT
Start: 2024-11-11 | End: 2024-11-12 | Stop reason: CLARIF

## 2024-11-11 RX ORDER — SODIUM CHLORIDE 9 MG/ML
INJECTION, SOLUTION INTRAVENOUS
Status: DISCONTINUED | OUTPATIENT
Start: 2024-11-11 | End: 2024-11-11 | Stop reason: SDUPTHER

## 2024-11-11 RX ORDER — POTASSIUM CHLORIDE 7.45 MG/ML
10 INJECTION INTRAVENOUS PRN
Status: DISCONTINUED | OUTPATIENT
Start: 2024-11-11 | End: 2024-11-13 | Stop reason: HOSPADM

## 2024-11-11 RX ORDER — SODIUM CHLORIDE 9 MG/ML
INJECTION, SOLUTION INTRAVENOUS PRN
Status: DISCONTINUED | OUTPATIENT
Start: 2024-11-11 | End: 2024-11-13 | Stop reason: HOSPADM

## 2024-11-11 RX ORDER — ONDANSETRON 4 MG/1
4 TABLET, ORALLY DISINTEGRATING ORAL EVERY 8 HOURS PRN
Status: DISCONTINUED | OUTPATIENT
Start: 2024-11-11 | End: 2024-11-12 | Stop reason: CLARIF

## 2024-11-11 RX ORDER — MEPERIDINE HYDROCHLORIDE 25 MG/ML
INJECTION INTRAMUSCULAR; INTRAVENOUS; SUBCUTANEOUS
Status: COMPLETED
Start: 2024-11-11 | End: 2024-11-11

## 2024-11-11 RX ORDER — SODIUM CHLORIDE, SODIUM LACTATE, POTASSIUM CHLORIDE, CALCIUM CHLORIDE 600; 310; 30; 20 MG/100ML; MG/100ML; MG/100ML; MG/100ML
INJECTION, SOLUTION INTRAVENOUS CONTINUOUS
Status: DISCONTINUED | OUTPATIENT
Start: 2024-11-11 | End: 2024-11-11

## 2024-11-11 RX ADMIN — LIDOCAINE HYDROCHLORIDE 100 MG: 20 INJECTION, SOLUTION EPIDURAL; INFILTRATION; INTRACAUDAL; PERINEURAL at 10:19

## 2024-11-11 RX ADMIN — WATER 2000 MG: 1 INJECTION INTRAMUSCULAR; INTRAVENOUS; SUBCUTANEOUS at 10:10

## 2024-11-11 RX ADMIN — FENTANYL CITRATE 25 MCG: 50 INJECTION, SOLUTION INTRAMUSCULAR; INTRAVENOUS at 10:21

## 2024-11-11 RX ADMIN — PROPOFOL 50 MG: 10 INJECTION, EMULSION INTRAVENOUS at 10:18

## 2024-11-11 RX ADMIN — FENTANYL CITRATE 25 MCG: 50 INJECTION, SOLUTION INTRAMUSCULAR; INTRAVENOUS at 11:07

## 2024-11-11 RX ADMIN — SODIUM CHLORIDE: 9 INJECTION, SOLUTION INTRAVENOUS at 08:30

## 2024-11-11 RX ADMIN — PROPOFOL 120 MCG/KG/MIN: 10 INJECTION, EMULSION INTRAVENOUS at 10:19

## 2024-11-11 RX ADMIN — ONDANSETRON 4 MG: 2 INJECTION INTRAMUSCULAR; INTRAVENOUS at 10:19

## 2024-11-11 RX ADMIN — FENTANYL CITRATE 25 MCG: 50 INJECTION, SOLUTION INTRAMUSCULAR; INTRAVENOUS at 10:18

## 2024-11-11 RX ADMIN — MEPERIDINE HYDROCHLORIDE 12.5 MG: 25 INJECTION INTRAMUSCULAR; INTRAVENOUS; SUBCUTANEOUS at 12:02

## 2024-11-11 RX ADMIN — CEFAZOLIN 1000 MG: 1 INJECTION, POWDER, FOR SOLUTION INTRAMUSCULAR; INTRAVENOUS at 18:52

## 2024-11-11 RX ADMIN — DOFETILIDE 500 MCG: 0.5 CAPSULE ORAL at 20:48

## 2024-11-11 RX ADMIN — FENTANYL CITRATE 25 MCG: 50 INJECTION, SOLUTION INTRAMUSCULAR; INTRAVENOUS at 11:12

## 2024-11-11 RX ADMIN — SODIUM CHLORIDE, PRESERVATIVE FREE 10 ML: 5 INJECTION INTRAVENOUS at 20:48

## 2024-11-11 RX ADMIN — SODIUM CHLORIDE, PRESERVATIVE FREE 10 ML: 5 INJECTION INTRAVENOUS at 21:00

## 2024-11-11 RX ADMIN — METOPROLOL SUCCINATE 25 MG: 25 TABLET, FILM COATED, EXTENDED RELEASE ORAL at 20:48

## 2024-11-11 RX ADMIN — MEPERIDINE HYDROCHLORIDE 12.5 MG: 25 INJECTION INTRAMUSCULAR; INTRAVENOUS; SUBCUTANEOUS at 11:57

## 2024-11-11 ASSESSMENT — PAIN SCALES - GENERAL
PAINLEVEL_OUTOF10: 0

## 2024-11-11 ASSESSMENT — ENCOUNTER SYMPTOMS: SHORTNESS OF BREATH: 1

## 2024-11-11 NOTE — PROGRESS NOTES
4 Eyes Skin Assessment     NAME:  Renzo Blas  YOB: 1945  MEDICAL RECORD NUMBER:  [id number]    The patient is being assessed for  Admission    I agree that at least one RN has performed a thorough Head to Toe Skin Assessment on the patient. ALL assessment sites listed below have been assessed.      Areas assessed by both nurses:    Head, Face, Ears, Shoulders, Back, Chest, Arms, Elbows, Hands, Sacrum. Buttock, Coccyx, Ischium, and Legs. Feet and Heels        Does the Patient have a Wound? No noted wound(s)       Moses Prevention initiated by RN: No  Wound Care Orders initiated by RN: No    Pressure Injury (Stage 3,4, Unstageable, DTI, NWPT, and Complex wounds) if present, place Wound referral order by RN under : No    New Ostomies, if present place, Ostomy referral order under : No     Nurse 1 eSignature: Electronically signed by Mary Soto RN on 11/11/24 at 6:25 PM EST    **SHARE this note so that the co-signing nurse can place an eSignature**    Nurse 2 eSignature: Electronically signed by Rosa Montoya RN on 11/11/24 at 7:50 PM EST

## 2024-11-11 NOTE — CARE COORDINATION
Met with patient about diagnosis and discharge plan of care. Post op cysto/TURP. CBI, wilkins cath, iv fluids. Advance diet. Pt lives with spouse in 2 story home, bed and bath on 1st floor. Pt uses no DME. PCP is Dr Andrez Sy. Pt has had wilkins cath at home in past. Declining needs. Will follow-o

## 2024-11-11 NOTE — ACP (ADVANCE CARE PLANNING)
Advance Care Planning   Healthcare Decision Maker:    Primary Decision Maker: Lorelei Blas - [facility] - [phone]    Click here to complete Healthcare Decision Makers including selection of the Healthcare Decision Maker Relationship (ie \"Primary\").

## 2024-11-11 NOTE — OP NOTE
[place] [address]                            OPERATIVE REPORT      PATIENT NAME: MARC DAVID                 : 1945  MED REC NO: [id number]                        ROOM: [facility]  ACCOUNT NO: [id number]                       ADMIT DATE: 2024  PROVIDER: Darius Mendoza MD      DATE OF PROCEDURE:  2024    SURGEON:  Darius Mendoza MD    PREOPERATIVE DIAGNOSIS:  Gross hematuria.    POSTOPERATIVE DIAGNOSIS:  Gross hematuria secondary to prostatic obstruction.    OPERATIONS:    1. Cystopanendoscopy.  2. Retrograde pyelogram.  3. Urethral dilatation.  4. Transurethral resection of prostate.    ESTIMATED BLOOD LOSS:  200 mL.    ANESTHESIA:  Under LMAC.    DESCRIPTION OF PROCEDURE:  With the patient in the lithotomy position under satisfactory sedation, the genitalia were prepped and draped in a sterile manner.  A 21-Japanese panendoscope passed through the pendulous and membranous urethra.  There were no strictures or lesions seen.  The prostatic fossa showed significant trilobar enlargement with a very friable mucosa.  Upon entering the bladder, urine was obtained for culture and sensitivity as well as for cytology.  Inspection with  with the right angle lens revealed a badly trabeculated bladder with a mucosa that appeared to show inflammatory changes, but there was no evidence of any specific mucosal lesions.  The trigone was symmetrical.  The ureteral orifices were of normal configuration and location.  Retrograde pyelograms demonstrated normal upper tracts with significant J-hooking.  At this point, the bladder did not appear to have a bladder tumor and the patient's problem appeared to be a very friable and large prostate.  It was decided to do a TUR at this time.  Urethra was sounded through a 30-Japanese sound, allowing placement of a 28-Japanese resectoscope sheath.  Resection was carried out using a  Tate-Urena resectoscope until all obstructing tissue was removed.  Following hemostasis, all clots and chips were evacuated and a 24, 30 mL 3-way Bustillo catheter was inserted, placed on continuous irrigation.  The patient tolerated the procedure well, was sent to the recovery room in satisfactory condition.          FABRICE WOLFE MD      D:  11/11/2024 11:51:44     T:  11/11/2024 12:57:17     JANENE/CRISTINA  Job #:  [id number]     Doc#:  [id number]

## 2024-11-11 NOTE — ANESTHESIA POSTPROCEDURE EVALUATION
Department of Anesthesiology  Postprocedure Note    Patient: Renzo Blas  MRN: [id number]  YOB: 1945  Date of evaluation: 11/11/2024    Procedure Summary       Date: 11/11/24 Room / Location: [address]    Anesthesia Start: 1009 Anesthesia Stop:     Procedure: CYSTOSCOPY CLOT EVACUATION AND FULGURATION WITH TRANSURETHERAL PROSTATE RESECTION (Penis) Diagnosis:       Gross hematuria      (Gross hematuria [R31.0])    Surgeons: Darius Mendoza MD Responsible Provider: Kalia Pino MD    Anesthesia Type: MAC ASA Status: 4            Anesthesia Type: No value filed.    June Phase I: June Score: 10    June Phase II:      Anesthesia Post Evaluation    Patient location during evaluation: PACU  Patient participation: complete - patient participated  Level of consciousness: awake  Airway patency: patent  Nausea & Vomiting: no nausea and no vomiting  Cardiovascular status: hemodynamically stable  Respiratory status: acceptable  Hydration status: euvolemic  Pain management: adequate        No notable events documented.

## 2024-11-11 NOTE — ANESTHESIA PRE PROCEDURE
GI/Hepatic/Renal:            ROS comment: BPH (benign prostatic hyperplasia).   Endo/Other: Negative Endo/Other ROS                    Abdominal:             Vascular: negative vascular ROS.         Other Findings:             Anesthesia Plan      MAC     ASA 4       Induction: intravenous.      Anesthetic plan and risks discussed with patient.      Plan discussed with surgical team and CRNA.                    Kalia Pino MD   11/11/2024

## 2024-11-12 LAB
ANION GAP SERPL CALCULATED.3IONS-SCNC: 10 MMOL/L (ref 7–16)
BUN SERPL-MCNC: 20 MG/DL (ref 6–23)
CALCIUM SERPL-MCNC: 8.7 MG/DL (ref 8.6–10.2)
CHLORIDE SERPL-SCNC: 108 MMOL/L (ref 98–107)
CO2 SERPL-SCNC: 21 MMOL/L (ref 22–29)
CREAT SERPL-MCNC: 1.3 MG/DL (ref 0.7–1.2)
ERYTHROCYTE [DISTWIDTH] IN BLOOD BY AUTOMATED COUNT: 14.1 % (ref 11.5–15)
GFR, ESTIMATED: 58 ML/MIN/1.73M2
GLUCOSE SERPL-MCNC: 134 MG/DL (ref 74–99)
HCT VFR BLD AUTO: 36.5 % (ref 37–54)
HGB BLD-MCNC: 12 G/DL (ref 12.5–16.5)
MCH RBC QN AUTO: 30.7 PG (ref 26–35)
MCHC RBC AUTO-ENTMCNC: 32.9 G/DL (ref 32–34.5)
MCV RBC AUTO: 93.4 FL (ref 80–99.9)
NON-GYN CYTOLOGY REPORT: NORMAL
PLATELET # BLD AUTO: 159 K/UL (ref 130–450)
PMV BLD AUTO: 9 FL (ref 7–12)
POTASSIUM SERPL-SCNC: 4.4 MMOL/L (ref 3.5–5)
RBC # BLD AUTO: 3.91 M/UL (ref 3.8–5.8)
SODIUM SERPL-SCNC: 139 MMOL/L (ref 132–146)
WBC OTHER # BLD: 11.3 K/UL (ref 4.5–11.5)

## 2024-11-12 PROCEDURE — 85027 COMPLETE CBC AUTOMATED: CPT

## 2024-11-12 PROCEDURE — 6370000000 HC RX 637 (ALT 250 FOR IP): Performed by: NURSE PRACTITIONER

## 2024-11-12 PROCEDURE — 6370000000 HC RX 637 (ALT 250 FOR IP): Performed by: UROLOGY

## 2024-11-12 PROCEDURE — 80048 BASIC METABOLIC PNL TOTAL CA: CPT

## 2024-11-12 PROCEDURE — 87086 URINE CULTURE/COLONY COUNT: CPT

## 2024-11-12 PROCEDURE — 87077 CULTURE AEROBIC IDENTIFY: CPT

## 2024-11-12 PROCEDURE — 2580000003 HC RX 258: Performed by: UROLOGY

## 2024-11-12 PROCEDURE — 87040 BLOOD CULTURE FOR BACTERIA: CPT

## 2024-11-12 PROCEDURE — 6360000002 HC RX W HCPCS: Performed by: UROLOGY

## 2024-11-12 PROCEDURE — G0378 HOSPITAL OBSERVATION PER HR: HCPCS

## 2024-11-12 PROCEDURE — 6370000000 HC RX 637 (ALT 250 FOR IP): Performed by: STUDENT IN AN ORGANIZED HEALTH CARE EDUCATION/TRAINING PROGRAM

## 2024-11-12 RX ORDER — SODIUM CHLORIDE, SODIUM LACTATE, POTASSIUM CHLORIDE, CALCIUM CHLORIDE 600; 310; 30; 20 MG/100ML; MG/100ML; MG/100ML; MG/100ML
INJECTION, SOLUTION INTRAVENOUS CONTINUOUS
Status: DISCONTINUED | OUTPATIENT
Start: 2024-11-12 | End: 2024-11-13 | Stop reason: HOSPADM

## 2024-11-12 RX ORDER — ACETAMINOPHEN 325 MG/1
650 TABLET ORAL EVERY 6 HOURS PRN
Status: DISCONTINUED | OUTPATIENT
Start: 2024-11-12 | End: 2024-11-13 | Stop reason: HOSPADM

## 2024-11-12 RX ADMIN — ACETAMINOPHEN 650 MG: 325 TABLET ORAL at 01:24

## 2024-11-12 RX ADMIN — CEFAZOLIN 1000 MG: 1 INJECTION, POWDER, FOR SOLUTION INTRAMUSCULAR; INTRAVENOUS at 01:32

## 2024-11-12 RX ADMIN — DOFETILIDE 500 MCG: 0.5 CAPSULE ORAL at 20:44

## 2024-11-12 RX ADMIN — ACETAMINOPHEN 650 MG: 325 TABLET ORAL at 17:08

## 2024-11-12 RX ADMIN — CEFAZOLIN 1000 MG: 1 INJECTION, POWDER, FOR SOLUTION INTRAMUSCULAR; INTRAVENOUS at 09:32

## 2024-11-12 RX ADMIN — METOPROLOL SUCCINATE 25 MG: 25 TABLET, FILM COATED, EXTENDED RELEASE ORAL at 20:45

## 2024-11-12 RX ADMIN — AMLODIPINE BESYLATE 5 MG: 5 TABLET ORAL at 09:31

## 2024-11-12 RX ADMIN — SODIUM CHLORIDE, POTASSIUM CHLORIDE, SODIUM LACTATE AND CALCIUM CHLORIDE: 600; 310; 30; 20 INJECTION, SOLUTION INTRAVENOUS at 09:30

## 2024-11-12 RX ADMIN — CEFAZOLIN 1000 MG: 1 INJECTION, POWDER, FOR SOLUTION INTRAMUSCULAR; INTRAVENOUS at 18:38

## 2024-11-12 RX ADMIN — DOFETILIDE 500 MCG: 0.5 CAPSULE ORAL at 09:34

## 2024-11-12 ASSESSMENT — PAIN SCALES - GENERAL: PAINLEVEL_OUTOF10: 5

## 2024-11-12 ASSESSMENT — PAIN DESCRIPTION - LOCATION: LOCATION: HEAD

## 2024-11-12 ASSESSMENT — PAIN DESCRIPTION - DESCRIPTORS: DESCRIPTORS: ACHING

## 2024-11-12 NOTE — PATIENT CARE CONFERENCE
P Quality Flow/Interdisciplinary Rounds Progress Note        Quality Flow Rounds held on November 12, 2024    Disciplines Attending:  Bedside Nurse, , , and Nursing Unit Leadership    Renzo Blas was admitted on 11/11/2024  7:36 AM    Anticipated Discharge Date:       Disposition:    Moses Score:  Moses Scale Score: 20    Readmission Risk              Risk of Unplanned Readmission:  0           Discussed patient goal for the day, patient clinical progression, and barriers to discharge.  The following Goal(s) of the Day/Commitment(s) have been identified:   Discharge planning.      Kassi León RN  November 12, 2024

## 2024-11-12 NOTE — PROGRESS NOTES
SUBJECTIVE:    Pt had temp to 102 last pm  Pt feels well this am  Urine clear   Cultures pending   Pt on ancef  Wbc 03790    OBJECTIVE:    BP (!) 118/52   Pulse 84   Temp 97.4 °F (36.3 °C) (Oral)   Resp 20   Ht 1.803 m (5' 11\")   Wt 93.9 kg (207 lb)   SpO2 92%   BMI 28.87 kg/m²     PHYSICAL EXAMINATION:  Skin: dry, without rashes  Respirations: non-labored, intact  Abdomen: soft, non-tender, non-distended      Lab Results   Component Value Date    WBC 11.3 2024    HGB 12.0 (L) 2024    HCT 36.5 (L) 2024    MCV 93.4 2024     2024       Lab Results   Component Value Date    CREATININE 1.3 (H) 2024       Lab Results   Component Value Date    PSA 4.20 (H) 2024    PSA 3.78 2023    PSA 5.26 (H) 2023       REVIEW OF SYSTEMS:    @[employer]@    PAST FAMILY HISTORY:    Family History   Problem Relation Age of Onset    Cancer Mother         Colon     Age 86    Cancer Father         Back Ca   Age 73 tumor in the back    No Known Problems Sister     No Known Problems Brother     No Known Problems Other     No Known Problems Child     No Known Problems Child     No Known Problems Child     Cancer Brother         Colon & prostate    No Known Problems Brother     Cancer Brother         Melanoma  age 45     PAST SOCIAL HISTORY:    Social History     Socioeconomic History    Marital status:      Spouse name: None    Number of children: None    Years of education: None    Highest education level: None   Tobacco Use    Smoking status: Former     Current packs/day: 0.00     Average packs/day: 1 pack/day for 14.0 years (14.0 ttl pk-yrs)     Types: Cigarettes, Pipe, Cigars     Start date:      Quit date:      Years since quittin.8     Passive exposure: Past    Smokeless tobacco: Never    Tobacco comments:     occasional   Vaping Use    Vaping status: Never Used   Substance and

## 2024-11-12 NOTE — CARE COORDINATION
Updated plan of care. POD#1 cysto/TURP. Temp 102.4 during night. Pending cultures. Unsure if home with wilkins, per urology. Pt with spouse. Plan is home. Will follow-mjo

## 2024-11-12 NOTE — PLAN OF CARE
Problem: Discharge Planning  Goal: Discharge to home or other facility with appropriate resources  11/12/2024 1121 by Adrienne Meyers RN  Outcome: Progressing  11/11/2024 2345 by Sharon Murray RN  Outcome: Progressing     Problem: Pain  Goal: Verbalizes/displays adequate comfort level or baseline comfort level  11/12/2024 1121 by Adrienne Meyers RN  Outcome: Progressing  11/11/2024 2345 by Sharon Murray RN  Outcome: Progressing     Problem: Safety - Adult  Goal: Free from fall injury  11/12/2024 1121 by Adrienne Meyers RN  Outcome: Progressing  11/11/2024 2345 by Sharon Murray RN  Outcome: Progressing     Problem: ABCDS Injury Assessment  Goal: Absence of physical injury  11/12/2024 1121 by Adrienne Meyers RN  Outcome: Progressing  11/11/2024 2345 by Sharon Murray RN  Outcome: Progressing

## 2024-11-12 NOTE — PROGRESS NOTES
Spiritual Health History and Assessment/Progress Note  [facility]    Attempted Encounter,  ,  ,      Name: Renzo Blas MRN: [id number]    Age: 79 y.o.     Sex: male   Language: English   Mandaeism: Uatsdin   BPH with obstruction/lower urinary tract symptoms     Date: 11/11/2024                           [facility]        Referral/Consult From: Rounding   Encounter Overview/Reason: Attempted Encounter  Service Provided For: Patient    Ivy, Belief, Meaning:   Patient unable to assess at this time  Family/Friends No family/friends present      Importance and Influence:  Patient unable to assess at this time  Family/Friends No family/friends present    Community:  Patient  went to see patient while rounding. Patient was sleeping.  Family/Friends No family/friends present    Assessment and Plan of Care:     Patient Interventions include:  prayed a silent prayer for the patient and left devotional material and information about  services.  Family/Friends Interventions include: No family/friends present    Patient Plan of Care: Spiritual Care available upon further referral  Family/Friends Plan of Care: No family/friends present    Electronically signed by Chaplain Ginger on 11/11/2024 at 7:33 PM

## 2024-11-12 NOTE — PLAN OF CARE
Problem: Discharge Planning  Goal: Discharge to home or other facility with appropriate resources  11/11/2024 2345 by Sharon Murray RN  Outcome: Progressing  11/11/2024 1826 by Mary Soto RN  Outcome: Progressing     Problem: Pain  Goal: Verbalizes/displays adequate comfort level or baseline comfort level  11/11/2024 2345 by Sharon Murray RN  Outcome: Progressing  11/11/2024 1826 by Mary Soto RN  Outcome: Progressing     Problem: Safety - Adult  Goal: Free from fall injury  11/11/2024 2345 by Sharon Murray RN  Outcome: Progressing  11/11/2024 1826 by Mary Soto RN  Outcome: Progressing     Problem: ABCDS Injury Assessment  Goal: Absence of physical injury  11/11/2024 2345 by Sharon Murray RN  Outcome: Progressing  11/11/2024 1826 by Mary Soto RN  Outcome: Progressing

## 2024-11-13 VITALS
HEIGHT: 71 IN | SYSTOLIC BLOOD PRESSURE: 131 MMHG | DIASTOLIC BLOOD PRESSURE: 67 MMHG | RESPIRATION RATE: 16 BRPM | TEMPERATURE: 98.2 F | OXYGEN SATURATION: 99 % | HEART RATE: 76 BPM | WEIGHT: 207 LBS | BODY MASS INDEX: 28.98 KG/M2

## 2024-11-13 LAB
MICROORGANISM SPEC CULT: ABNORMAL
SERVICE CMNT-IMP: ABNORMAL
SPECIMEN DESCRIPTION: ABNORMAL

## 2024-11-13 PROCEDURE — 6370000000 HC RX 637 (ALT 250 FOR IP): Performed by: UROLOGY

## 2024-11-13 PROCEDURE — G0378 HOSPITAL OBSERVATION PER HR: HCPCS

## 2024-11-13 PROCEDURE — 2580000003 HC RX 258: Performed by: UROLOGY

## 2024-11-13 PROCEDURE — 6370000000 HC RX 637 (ALT 250 FOR IP): Performed by: NURSE PRACTITIONER

## 2024-11-13 PROCEDURE — 6360000002 HC RX W HCPCS: Performed by: UROLOGY

## 2024-11-13 RX ORDER — OXYCODONE AND ACETAMINOPHEN 5; 325 MG/1; MG/1
1 TABLET ORAL EVERY 6 HOURS PRN
Qty: 12 TABLET | Refills: 0 | Status: SHIPPED | OUTPATIENT
Start: 2024-11-13 | End: 2024-11-16

## 2024-11-13 RX ORDER — CIPROFLOXACIN 250 MG/1
250 TABLET, FILM COATED ORAL 2 TIMES DAILY
Qty: 14 TABLET | Refills: 0 | Status: SHIPPED | OUTPATIENT
Start: 2024-11-13 | End: 2024-11-20

## 2024-11-13 RX ADMIN — CEFAZOLIN 1000 MG: 1 INJECTION, POWDER, FOR SOLUTION INTRAMUSCULAR; INTRAVENOUS at 10:39

## 2024-11-13 RX ADMIN — DOFETILIDE 500 MCG: 0.5 CAPSULE ORAL at 08:56

## 2024-11-13 RX ADMIN — SODIUM CHLORIDE, POTASSIUM CHLORIDE, SODIUM LACTATE AND CALCIUM CHLORIDE: 600; 310; 30; 20 INJECTION, SOLUTION INTRAVENOUS at 07:26

## 2024-11-13 RX ADMIN — AMLODIPINE BESYLATE 5 MG: 5 TABLET ORAL at 08:56

## 2024-11-13 RX ADMIN — CEFAZOLIN 1000 MG: 1 INJECTION, POWDER, FOR SOLUTION INTRAMUSCULAR; INTRAVENOUS at 02:48

## 2024-11-13 NOTE — PROGRESS NOTES
11/13/2024 8:50 AM  Renzo Blas  [id number]    Subjective:      Awake and alert  Denies pain  Denies new complaints  Bustillo catheter still in place draining clear yellow urine  States he is ready for discharge    Review of Systems  Constitutional: No fever or chills   Respiratory: negative for cough and hemoptysis  Cardiovascular: negative for chest pain and dyspnea  Gastrointestinal: negative for abdominal pain, diarrhea, nausea and vomiting   : See above  Derm: negative for rash and skin lesion(s)  Neurological: negative for seizures and tremors  Musculoskeletal: Negative    Psychiatric: Negative   All other reviews are negative      Scheduled Meds:   sodium chloride flush  5-40 mL IntraVENous 2 times per day    ceFAZolin  1,000 mg IntraVENous Q8H    sodium chloride flush  5-40 mL IntraVENous 2 times per day    amLODIPine  5 mg Oral QAM    metoprolol succinate  25 mg Oral Daily    dofetilide  500 mcg Oral 2 times per day       Objective:  Vitals:    11/13/24 0713   BP: 131/67   Pulse: 76   Resp: 16   Temp: 98.2 °F (36.8 °C)   SpO2:          Allergies: Patient has no known allergies.    General Appearance: alert and oriented to person, place and time and in no acute distress  Skin: no rash or erythema  Head: normocephalic and atraumatic  Pulmonary/Chest: normal air movement, no respiratory distress  Abdomen: soft, non-tender, non-distended  Genitourinary: Bustillo catheter in place draining clear yellow urine  Extremities: no cyanosis, clubbing or edema         Labs:     Recent Labs     11/12/24  0110      K 4.4   *   CO2 21*   BUN 20   CREATININE 1.3*   GLUCOSE 134*   CALCIUM 8.7       Lab Results   Component Value Date/Time    HGB 12.0 11/12/2024 01:10 AM    HCT 36.5 11/12/2024 01:10 AM       Lab Results   Component Value Date    PSA 4.20 (H) 09/12/2024    PSA 3.78 08/07/2023    PSA 5.26 (H) 05/08/2023         Assessment/Plan:  POD #2 s/p cystoscopy, retrograde pyelogram, urethral dilatation,  TURP  UTI    Urine culture reviewed  Patient will be discharged with ciprofloxacin 250 mg twice daily x 7 days  He has outpatient follow-up in the office for this Friday which time his Bustillo catheter will be removed  He was instructed to hold Eliquis until he sees Dr. Darius Mendoza in office on Friday  He can resume Plavix  Stable for discharge    Geraldo Villela PA-C   [facility]  Urology

## 2024-11-13 NOTE — CARE COORDINATION
Updated plan of care. Home with wilkins cath, staff to instruction. Plan for discharge today. Declining needs-toan

## 2024-11-13 NOTE — DISCHARGE SUMMARY
Discharge Summary    Date: 11/13/2024  Patient Name: Renzo Blas    YOB: 1945     Age: 79 y.o.    Admit Date: 11/11/2024  Discharge Date: 11/13/2024  Discharge Condition: Stable    Admission Diagnosis  Gross hematuria [R31.0];BPH with obstruction/lower urinary tract symptoms [N40.1, N13.8]      Discharge Diagnosis  Principal Problem:    BPH with obstruction/lower urinary tract symptoms  Resolved Problems:    * No resolved hospital problems. *      Hospital Stay  Narrative of Hospital Course:  Patient presented to hospital for cystoscopy, retrograde pyelogram, urethral dilation, TURP.  Hospital stay was complicated by postoperative fevers.  He was kept due to risk for postoperative bleeding and UTI.  Urine cultures have returned and are sensitive to ciprofloxacin.  He will be sent home on ciprofloxacin.  He denies any complaints on the day of discharge.  His Bustillo catheter is in place and draining appropriately.  Stable for discharge    Consultants:  None    Surgeries/procedures Performed:      Treatments:            Discharge Plan/Disposition:  Home    Hospital/Incidental Findings Requiring Follow Up:    Patient Instructions:    Diet: Regular Diet    Activity:No Heavy Lifting  For number of days (if applicable):      Other Instructions:    Provider Follow-Up:   No follow-ups on file.     Significant Diagnostic Studies:    Recent Labs:  Admission on 11/11/2024  Specimen Description                          Date: 11/11/2024  Value: .URINE        Status: Final  Special Requests                              Date: 11/11/2024  Value: Site: Urine   Status: Final  Culture                                       Date: 11/11/2024  Value: ENTEROBACTER CLOACAE COMPLEX Identification by MA*                       Status: Final  Non-Gyn Cytology Report                       Date: 11/11/2024  Value:               Status: Final                   Value:Path Number: [id number]    INTERPRETATION    URINE,

## 2024-11-14 ENCOUNTER — CARE COORDINATION (OUTPATIENT)
Dept: CARE COORDINATION | Age: 79
End: 2024-11-14

## 2024-11-14 LAB
MICROORGANISM SPEC CULT: ABNORMAL
SERVICE CMNT-IMP: ABNORMAL
SPECIMEN DESCRIPTION: ABNORMAL

## 2024-11-14 NOTE — CARE COORDINATION
Care Transitions Note    Initial Call - Call within 2 business days of discharge: Yes    Patient Current Location:  Home: [address] [place] [address], [address]  [address]  [facility] [address]    Care Transition Nurse contacted the spouse/partner , (PHI form verified; on file) by telephone to perform post hospital discharge assessment, verified name and  as identifiers. Provided introduction to self, and explanation of the Care Transition Nurse role.     Patient: Renzo Blas    Patient : 1945   MRN: [id number]    Reason for Admission: BPH, s/p cysto, retro, TURP  Discharge Date: 24  RURS: No data recorded    Last Discharge Facility       Date Complaint Diagnosis Description Type Department Provider    24  BPH with obstruction/lower urinary tract symptoms ... Admission (Discharged) Darius Valerio MD     Additional needs identified to be addressed with provider   No needs identified             Method of communication with provider: none.    Patients top risk factors for readmission: medical condition-     Care Summary Note: Spoke with Renzo's wife Lorelei for initial observation status care transition call post hospital discharge. She reports that he is \"much better\" today and reports he got a good nights sleep. She denies any fevers since discharge. Lorelei confirmed the Bustillo catheter is draining clear yellow urine without issue. Renzo is scheduled with Dr. Mendoza tomorrow, 11/15/24.   Lorelei confirmed he is taking the newly ordered Cipro and is holding his Eliquis until he sees urology tomorrow.   She denies any needs, questions, or concerns at this time.      Care Transition Nurse reviewed discharge instructions, medical action plan, and red flags with spouse/partner. The spouse/partner was given an opportunity to ask questions; no further questions or concerns at this time.. The spouse/partner verbalized understanding.  and demonstrated understanding using teach back method.   Were

## 2024-11-15 LAB — SURGICAL PATHOLOGY REPORT: NORMAL

## 2024-11-17 LAB
MICROORGANISM SPEC CULT: NORMAL
MICROORGANISM SPEC CULT: NORMAL
SERVICE CMNT-IMP: NORMAL
SERVICE CMNT-IMP: NORMAL
SPECIMEN DESCRIPTION: NORMAL
SPECIMEN DESCRIPTION: NORMAL

## 2024-11-18 ENCOUNTER — TELEPHONE (OUTPATIENT)
Dept: PRIMARY CARE CLINIC | Age: 79
End: 2024-11-18

## 2024-11-18 NOTE — TELEPHONE ENCOUNTER
Pt came into the office stating that he is having some left arm swelling and neck pain. He was not sure if it was related to the Cipro that he is on that the hospital gave him.

## 2024-11-18 NOTE — TELEPHONE ENCOUNTER
Pt came back into the office. I did let the pt know that Dr Sy had just came back into the office at 1:30 pm and he has been seeing patients back to back so he has not had a chance to look at his messages. He said okay and that he would wait for an answer.     I did let him know that I am not sure how long that would be and he started saying how he needs an answer because he is not sure if he needs to not take this medication or not. I said okay.

## 2024-11-18 NOTE — TELEPHONE ENCOUNTER
Talked to Dr Sy between patients. He stated for him to stop the medication if he believes that is what is causing his symptoms.     Went out to waiting room to let pt know. Pt stated okay. I let him know to call us back in a few days to let us know if the swelling has gone done.     He then informed me that while he was in the hospital they had the IV in the arm that is swollen and asked if that could cause that swelling. I said it could, but would ask you about that.

## 2024-11-19 NOTE — TELEPHONE ENCOUNTER
Swelling in arm could definitely be from were his iv site was. If not improving then follow up  needed.

## 2024-11-21 ENCOUNTER — CARE COORDINATION (OUTPATIENT)
Dept: CARE COORDINATION | Age: 79
End: 2024-11-21

## 2024-11-21 NOTE — TELEPHONE ENCOUNTER
Called pt and he stated that his arm is still swollen. He said it gives him a little bit of pain. I did schedule him an appt for him to come in and see you for 11/26/2024.

## 2024-11-21 NOTE — CARE COORDINATION
Care Transitions Note    Follow Up Call     Attempted to reach the patient for subsequent Care Transition call. Call to listed home number was answered but disconnected. HIPAA compliant message left on listed mobile with CTN's contact information requesting return phone call.      Follow Up Appointment:   Future Appointments         Provider Specialty Dept Phone    11/26/2024 1:45 PM Bart Sy, DO Primary Care [phone]          Loretta Hernandez RN

## 2024-11-21 NOTE — TELEPHONE ENCOUNTER
Apply moist heat to this area and if not improved office visit needed on the 26th.  If improved he could cancel the visit.

## 2024-11-22 ENCOUNTER — TELEPHONE (OUTPATIENT)
Dept: NON INVASIVE DIAGNOSTICS | Age: 79
End: 2024-11-22

## 2024-11-22 NOTE — TELEPHONE ENCOUNTER
Patient notified and verbalized understanding. The patient is sending a remote transmission now and has an appointment with Urology on 11/27/2024. Patient will hold OAC for two days for now.     Electronically signed by Lyudmila Salas MA on 11/22/2024 at 12:28 PM

## 2024-11-22 NOTE — TELEPHONE ENCOUNTER
The patient left a VM stating he has blood in his urine. He said Dr. Hardwick did a procedure on him and stopped his OAC but then when he resumed it the blood in urine started. Two weeks from today is when he had a procedure to clean out his bladder and something with his prostate. The patient resumed the OAC for a day and had blood in his urine. Dr. Hardwick told him to stop the OAC for a day and the patient had no blood in his urine. The patient restarted taking OAC again for about 3-4 days now and has blood in his urine worse than before. I advised to call Dr. Hardwick's office and I will send a message to our NP for further advisement. The patient verbalized understanding.     Electronically signed by Lyudmila Salas MA on 11/22/2024 at 10:15 AM

## 2024-11-26 ENCOUNTER — OFFICE VISIT (OUTPATIENT)
Dept: PRIMARY CARE CLINIC | Age: 79
End: 2024-11-26
Payer: MEDICARE

## 2024-11-26 ENCOUNTER — CARE COORDINATION (OUTPATIENT)
Dept: CARE COORDINATION | Age: 79
End: 2024-11-26

## 2024-11-26 VITALS
WEIGHT: 209 LBS | BODY MASS INDEX: 29.26 KG/M2 | DIASTOLIC BLOOD PRESSURE: 64 MMHG | SYSTOLIC BLOOD PRESSURE: 128 MMHG | OXYGEN SATURATION: 97 % | HEART RATE: 91 BPM | TEMPERATURE: 97.7 F | HEIGHT: 71 IN

## 2024-11-26 DIAGNOSIS — I80.8 THROMBOPHLEBITIS OF ARM, LEFT: Primary | ICD-10-CM

## 2024-11-26 PROCEDURE — 1036F TOBACCO NON-USER: CPT | Performed by: FAMILY MEDICINE

## 2024-11-26 PROCEDURE — 1159F MED LIST DOCD IN RCRD: CPT | Performed by: FAMILY MEDICINE

## 2024-11-26 PROCEDURE — 99213 OFFICE O/P EST LOW 20 MIN: CPT | Performed by: FAMILY MEDICINE

## 2024-11-26 PROCEDURE — 1123F ACP DISCUSS/DSCN MKR DOCD: CPT | Performed by: FAMILY MEDICINE

## 2024-11-26 PROCEDURE — G8484 FLU IMMUNIZE NO ADMIN: HCPCS | Performed by: FAMILY MEDICINE

## 2024-11-26 PROCEDURE — 1160F RVW MEDS BY RX/DR IN RCRD: CPT | Performed by: FAMILY MEDICINE

## 2024-11-26 PROCEDURE — G8427 DOCREV CUR MEDS BY ELIG CLIN: HCPCS | Performed by: FAMILY MEDICINE

## 2024-11-26 PROCEDURE — G8417 CALC BMI ABV UP PARAM F/U: HCPCS | Performed by: FAMILY MEDICINE

## 2024-11-26 ASSESSMENT — ENCOUNTER SYMPTOMS: SHORTNESS OF BREATH: 0

## 2024-11-26 NOTE — CARE COORDINATION
Care Transitions Note    Follow Up Call   Second and final attempt to reach the patient for subsequent Care Transition call. HIPAA compliant message left with CTN's contact information requesting return phone call.   Will sign off.    Follow Up Appointment:   Future Appointments         Provider Specialty Dept Phone    5/27/2025 2:30 PM Bart Sy,  Primary Care [phone]          Loretta Hernandez RN

## 2024-11-26 NOTE — PROGRESS NOTES
Renzo Blas, a male of 79 y.o. came to the office 11/26/2024.     Patient Active Problem List   Diagnosis    Hyperlipidemia    Tinnitus    Mobitz (type) I (Wenckebach's) atrioventricular block    Coronary artery disease due to lipid rich plaque    Second degree AV block, Mobitz type II    Pacemaker    PAF (paroxysmal atrial fibrillation) (HCC)    Persistent atrial fibrillation (HCC)    BPH with obstruction/lower urinary tract symptoms          Edema  This is a new problem. The current episode started 1 to 4 weeks ago (10 days ago getting iv in left arm with difficulty. numerous attempts. later that day got cramping in left arm and swelling.  arm never got hot or red.). Pertinent negatives include no chest pain, chills or fever. Exacerbated by: numerous iv sticks. Treatments tried: moist heat to area. The treatment provided moderate relief.        No Known Allergies    Current Outpatient Medications on File Prior to Visit   Medication Sig Dispense Refill    diphenhydrAMINE-APAP, sleep, (TYLENOL PM EXTRA STRENGTH PO) Take by mouth daily as needed      amLODIPine (NORVASC) 5 MG tablet TAKE 1 TABLET BY MOUTH IN THE  MORNING 90 tablet 3    dofetilide (TIKOSYN) 500 MCG capsule TAKE 1 CAPSULE BY MOUTH EVERY 12 HOURS (Patient taking differently: TAKE 1 CAPSULE BY MOUTH EVERY 12 HOURS. Patient takes at 8am and 8pm) 180 capsule 1    apixaban (ELIQUIS) 5 MG TABS tablet TAKE 1 TABLET BY MOUTH TWICE  DAILY 180 tablet 3    metoprolol succinate (TOPROL XL) 25 MG extended release tablet TAKE 1 TABLET BY MOUTH TWICE  DAILY 180 tablet 3    rosuvastatin (CRESTOR) 20 MG tablet Take 1 tablet by mouth daily 90 tablet 1    clopidogrel (PLAVIX) 75 MG tablet TAKE 1 TABLET BY MOUTH DAILY 90 tablet 3    Multiple Vitamins-Minerals (CENTRUM SILVER ADULT 50+) TABS Take 1 tablet by mouth daily      Omega-3 Fatty Acids (FISH OIL EXTRA STRENGTH PO) Take 1,000 mg by mouth daily        No current facility-administered medications on file prior to

## 2024-12-09 RX ORDER — ROSUVASTATIN CALCIUM 20 MG/1
20 TABLET, COATED ORAL DAILY
Qty: 90 TABLET | Refills: 1 | Status: SHIPPED | OUTPATIENT
Start: 2024-12-09

## 2024-12-09 NOTE — TELEPHONE ENCOUNTER
Name of Medication(s) Requested:  Requested Prescriptions     Pending Prescriptions Disp Refills    rosuvastatin (CRESTOR) 20 MG tablet [Pharmacy Med Name: Rosuvastatin Calcium 20 MG Oral Tablet] 90 tablet 3     Sig: TAKE 1 TABLET BY MOUTH DAILY       Medication is on current medication list Yes    Dosage and directions were verified? Yes    Quantity verified: 90 day supply     Pharmacy Verified?  Yes    Last Appointment:  11/26/2024    Future appts:  Future Appointments   Date Time Provider Department Center   5/27/2025  2:30 PM Bart Sy DO [facility]        (If no appt send self scheduling link. .REFILLAPPT)  Scheduling request sent?     [] Yes  [x] No    Does patient need updated?  [] Yes  [x] No

## 2024-12-12 DIAGNOSIS — I48.0 PAF (PAROXYSMAL ATRIAL FIBRILLATION) (HCC): ICD-10-CM

## 2024-12-12 RX ORDER — DOFETILIDE 0.5 MG/1
CAPSULE ORAL
Qty: 180 CAPSULE | Refills: 0 | Status: SHIPPED | OUTPATIENT
Start: 2024-12-12

## 2024-12-12 NOTE — TELEPHONE ENCOUNTER
LM to call the office to discuss labs. Orders for labs placed in epic to be done Feb/Mar 2025.    Electronically signed by Lyudmila Salas MA on 12/12/2024 at 1:11 PM

## 2024-12-12 NOTE — TELEPHONE ENCOUNTER
Lab Results   Component Value Date    CREATININE 1.3 (H) 11/12/2024        CrCl 60.90 based on actual weight.     CrCl 48.27 based on ideal body weight    Last QTc stable~ 490 ms with QRS~ 183 ms and Vpaced from EKG on 11/7/24.     Patient is on tikosyn 500 mcg BID. Refill sent for 3 months.   Repeat Labs in 3-months please  Carmenza Magdaleno, APRN - CNP

## 2024-12-13 NOTE — TELEPHONE ENCOUNTER
The patient has been notified and since he is in [place], he will get the labs done down there. I emailed the orders to [email or web address] per his request.       Electronically signed by Lyudmila Salas MA on 12/13/2024 at 1:56 PM

## 2024-12-27 RX ORDER — CLOPIDOGREL BISULFATE 75 MG/1
TABLET ORAL
Qty: 90 TABLET | Refills: 3 | Status: SHIPPED | OUTPATIENT
Start: 2024-12-27

## 2024-12-30 ENCOUNTER — TELEPHONE (OUTPATIENT)
Dept: NON INVASIVE DIAGNOSTICS | Age: 79
End: 2024-12-30

## 2024-12-30 NOTE — TELEPHONE ENCOUNTER
I returned the patient's call about his Tikosyn refill. The patient said [facility] told him there is an issue with the refill. I called the pharmacy and spoke with a pharmacist named Renzo. Renzo said the refill needs a prior auth. Message sent to Tahmina to process via CoverMyMeds. Patient notified and verbalized understanding.     Electronically signed by Lyudmila Salas MA on 12/30/2024 at 2:41 PM

## 2024-12-31 NOTE — TELEPHONE ENCOUNTER
Started prior Auth on Cover my meds,  Dofetilide is no Auth needed, called [facility] spoke with Bubba she stated dofetilide was filled and shipped and patient should be receiving soon.   Call Ref # [id number]

## 2025-03-07 DIAGNOSIS — I48.0 PAF (PAROXYSMAL ATRIAL FIBRILLATION) (HCC): ICD-10-CM

## 2025-03-07 RX ORDER — DOFETILIDE 0.5 MG/1
CAPSULE ORAL
Qty: 180 CAPSULE | Refills: 1 | Status: SHIPPED | OUTPATIENT
Start: 2025-03-07

## 2025-05-14 ENCOUNTER — OFFICE VISIT (OUTPATIENT)
Dept: NON INVASIVE DIAGNOSTICS | Age: 80
End: 2025-05-14

## 2025-05-14 VITALS
HEIGHT: 71 IN | SYSTOLIC BLOOD PRESSURE: 126 MMHG | RESPIRATION RATE: 16 BRPM | HEART RATE: 70 BPM | WEIGHT: 202.6 LBS | OXYGEN SATURATION: 97 % | BODY MASS INDEX: 28.36 KG/M2 | DIASTOLIC BLOOD PRESSURE: 70 MMHG | TEMPERATURE: 97.3 F

## 2025-05-14 DIAGNOSIS — Z51.81 VISIT FOR MONITORING TIKOSYN THERAPY: ICD-10-CM

## 2025-05-14 DIAGNOSIS — I48.0 PAF (PAROXYSMAL ATRIAL FIBRILLATION) (HCC): ICD-10-CM

## 2025-05-14 DIAGNOSIS — I48.0 PAF (PAROXYSMAL ATRIAL FIBRILLATION) (HCC): Primary | ICD-10-CM

## 2025-05-14 DIAGNOSIS — Z95.0 CARDIAC PACEMAKER IN SITU: ICD-10-CM

## 2025-05-14 DIAGNOSIS — Z79.899 VISIT FOR MONITORING TIKOSYN THERAPY: ICD-10-CM

## 2025-05-14 DIAGNOSIS — I48.19 PERSISTENT ATRIAL FIBRILLATION (HCC): ICD-10-CM

## 2025-05-14 LAB
ANION GAP SERPL CALCULATED.3IONS-SCNC: 11 MMOL/L (ref 7–16)
BUN BLDV-MCNC: 24 MG/DL (ref 8–23)
CALCIUM SERPL-MCNC: 9.5 MG/DL (ref 8.8–10.2)
CHLORIDE BLD-SCNC: 107 MMOL/L (ref 98–107)
CO2: 23 MMOL/L (ref 22–29)
CREAT SERPL-MCNC: 1.2 MG/DL (ref 0.7–1.2)
GFR, ESTIMATED: 62 ML/MIN/1.73M2
GLUCOSE BLD-MCNC: 95 MG/DL (ref 74–99)
HCT VFR BLD CALC: 39.9 % (ref 37–54)
HEMOGLOBIN: 13.1 G/DL (ref 12.5–16.5)
MAGNESIUM: 2.2 MG/DL (ref 1.6–2.4)
MCH RBC QN AUTO: 29.5 PG (ref 26–35)
MCHC RBC AUTO-ENTMCNC: 32.8 G/DL (ref 32–34.5)
MCV RBC AUTO: 89.9 FL (ref 80–99.9)
PDW BLD-RTO: 14.3 % (ref 11.5–15)
PLATELET # BLD: 273 K/UL (ref 130–450)
PMV BLD AUTO: 8.8 FL (ref 7–12)
POTASSIUM SERPL-SCNC: 4.8 MMOL/L (ref 3.5–5.1)
RBC # BLD: 4.44 M/UL (ref 3.8–5.8)
SODIUM BLD-SCNC: 141 MMOL/L (ref 136–145)
WBC # BLD: 8.7 K/UL (ref 4.5–11.5)

## 2025-05-14 NOTE — PROGRESS NOTES
Patient Labs drawn form Right arm   Labs ordered by Dr Arturo Gtz  Patient tolerated well.   Cbc,mag.,bmp  Electronically signed by SRIDEVI CORDON MA on 5/14/2025 at 10:26 AM   
cardiology    Assessment and plan:    1. Persistent atrial fibrillation  - [id number] of 4 (age, HTN, CAD)  - Diagnosed with PAF on 10/6/20  - Started on Eliquis 5 mg BID on 10/8/20.   - Noted to be in persistent AF since June 2021 and subsequently underwent DC-cardioversion in October 2021.   - Noted to have recurrent AF in November 2021 and Tikosyn initiation on 11/29/21.  - On Toprol XL for rate control.  - Presents in NSR with stable QTc.  - AF burden 2%.  - CrCl by IBW of 42.96 mL/min based on creatinine of 1.46 on 2/4/25     2. Pacemaker in situ   - DOI: 09/23/2020  - BSCI; dual chamber  - Indication: second degree AV block Mobitz type II  - Normal device function.     3. Coronary artery disease  - Status post PCI to the LAD 10/3/17.  - On Plavix, Toprol XL and Crestor     4. Hypertension  - Well controlled.  - On Norvasc and Toprol XL    5. Hyperlipidemia  - On Crestor.    6. Hematuria  - Developed hematuria due to prostate obstruction in November 2024 and underwent cystoscopy with clot evacuation and fulguration with transurethral prostate resection on 11/11/24.    Recommendations:     1. Normal pacemaker function.   2. Continue Tikosyn 500 mcg every 12 hours. Await for serum creatinine today and Tikosyn dose may need to be reduced if serum creatinine elevates.  3. Continue Toprol XL 25 mg BID.  4. Continue Eliquis 5 mg BID. Await for serum creatinine today and Eliquis dose may need to be reduce if serum creatinine elevates or equal to 1.5  5. Obtain EKG, BMP and magnesium every 3 to 6 months-Will check CBC, BMP and magnesium today  6. Remote monitoring every 3 months.  7. Follow up in 6 months or sooner PRN. Encouraged the patient to call the office for any questions or concerns.    I have spent a total of 40 minutes with the patient and the family reviewing the above stated recommendations.  And a total of >50% of that time involved face-to-face time providing counseling and/or coordination of care with

## 2025-05-15 ENCOUNTER — RESULTS FOLLOW-UP (OUTPATIENT)
Dept: NON INVASIVE DIAGNOSTICS | Age: 80
End: 2025-05-15

## 2025-05-15 DIAGNOSIS — I48.0 PAF (PAROXYSMAL ATRIAL FIBRILLATION) (HCC): ICD-10-CM

## 2025-05-15 RX ORDER — METOPROLOL SUCCINATE 25 MG/1
TABLET, EXTENDED RELEASE ORAL
Qty: 90 TABLET | Refills: 3 | Status: SHIPPED | OUTPATIENT
Start: 2025-05-15

## 2025-05-15 NOTE — TELEPHONE ENCOUNTER
Pt notified of results and verbalized understanding.    Electronically signed by Lyudmila Salas MA on 5/15/2025 at 2:31 PM'

## 2025-05-15 NOTE — TELEPHONE ENCOUNTER
Patients last appointment 11/26/2024.  Patients next scheduled appointment   Future Appointments   Date Time Provider Department Center   5/27/2025  2:30 PM Bart Sy DO [facility]   11/19/2025 10:30 AM Senthil Moreno, [facility] - CNP [facility]   11/19/2025 10:30 AM SCHEDULE, [facility]

## 2025-05-16 RX ORDER — ROSUVASTATIN CALCIUM 20 MG/1
20 TABLET, COATED ORAL DAILY
Qty: 90 TABLET | Refills: 0 | Status: SHIPPED | OUTPATIENT
Start: 2025-05-16

## 2025-05-25 ASSESSMENT — PATIENT HEALTH QUESTIONNAIRE - PHQ9
SUM OF ALL RESPONSES TO PHQ QUESTIONS 1-9: 0
2. FEELING DOWN, DEPRESSED OR HOPELESS: NOT AT ALL
SUM OF ALL RESPONSES TO PHQ QUESTIONS 1-9: 0
1. LITTLE INTEREST OR PLEASURE IN DOING THINGS: NOT AT ALL
SUM OF ALL RESPONSES TO PHQ QUESTIONS 1-9: 0
SUM OF ALL RESPONSES TO PHQ QUESTIONS 1-9: 0
1. LITTLE INTEREST OR PLEASURE IN DOING THINGS: NOT AT ALL
SUM OF ALL RESPONSES TO PHQ9 QUESTIONS 1 & 2: 0
2. FEELING DOWN, DEPRESSED OR HOPELESS: NOT AT ALL

## 2025-05-27 ENCOUNTER — OFFICE VISIT (OUTPATIENT)
Dept: PRIMARY CARE CLINIC | Age: 80
End: 2025-05-27
Payer: MEDICARE

## 2025-05-27 VITALS
TEMPERATURE: 97 F | HEIGHT: 71 IN | DIASTOLIC BLOOD PRESSURE: 70 MMHG | SYSTOLIC BLOOD PRESSURE: 118 MMHG | OXYGEN SATURATION: 98 % | HEART RATE: 69 BPM | WEIGHT: 203 LBS | BODY MASS INDEX: 28.42 KG/M2

## 2025-05-27 DIAGNOSIS — E78.2 MIXED HYPERLIPIDEMIA: ICD-10-CM

## 2025-05-27 DIAGNOSIS — I25.10 CORONARY ARTERY DISEASE INVOLVING NATIVE CORONARY ARTERY OF NATIVE HEART WITHOUT ANGINA PECTORIS: ICD-10-CM

## 2025-05-27 DIAGNOSIS — Z00.00 MEDICARE ANNUAL WELLNESS VISIT, SUBSEQUENT: Primary | ICD-10-CM

## 2025-05-27 PROCEDURE — G0439 PPPS, SUBSEQ VISIT: HCPCS | Performed by: FAMILY MEDICINE

## 2025-05-27 PROCEDURE — 1159F MED LIST DOCD IN RCRD: CPT | Performed by: FAMILY MEDICINE

## 2025-05-27 PROCEDURE — 1123F ACP DISCUSS/DSCN MKR DOCD: CPT | Performed by: FAMILY MEDICINE

## 2025-05-27 PROCEDURE — 1160F RVW MEDS BY RX/DR IN RCRD: CPT | Performed by: FAMILY MEDICINE

## 2025-05-27 ASSESSMENT — ENCOUNTER SYMPTOMS
CHEST TIGHTNESS: 0
SHORTNESS OF BREATH: 0

## 2025-05-27 ASSESSMENT — PATIENT HEALTH QUESTIONNAIRE - PHQ9
SUM OF ALL RESPONSES TO PHQ QUESTIONS 1-9: 0
1. LITTLE INTEREST OR PLEASURE IN DOING THINGS: NOT AT ALL
SUM OF ALL RESPONSES TO PHQ QUESTIONS 1-9: 0
2. FEELING DOWN, DEPRESSED OR HOPELESS: NOT AT ALL

## 2025-05-27 ASSESSMENT — LIFESTYLE VARIABLES
HOW OFTEN DO YOU HAVE A DRINK CONTAINING ALCOHOL: MONTHLY OR LESS
HOW MANY STANDARD DRINKS CONTAINING ALCOHOL DO YOU HAVE ON A TYPICAL DAY: 1 OR 2

## 2025-05-27 NOTE — PROGRESS NOTES
Medicare Annual Wellness Visit    Renzo Blas is here for Medicare AWV    Assessment & Plan   Medicare annual wellness visit, subsequent  Coronary artery disease involving native coronary artery of native heart without angina pectoris  -     Lipid Panel; Future  Mixed hyperlipidemia  -     Lipid Panel; Future       Return for Medicare Annual Wellness Visit in 1 year, or for acute problem.     Subjective   The following acute and/or chronic problems were also addressed today:    Renzo Blas, a male of 79 y.o. came to the office 5/27/2025.     Patient Active Problem List   Diagnosis    Hyperlipidemia    Tinnitus    Mobitz (type) I (Wenckebach's) atrioventricular block    Coronary artery disease due to lipid rich plaque    Second degree AV block, Mobitz type II    Pacemaker    PAF (paroxysmal atrial fibrillation) (HCC)    Persistent atrial fibrillation (HCC)    BPH with obstruction/lower urinary tract symptoms          Hyperlipidemia  This is a chronic problem. The current episode started more than 1 year ago. The problem is controlled. Pertinent negatives include no chest pain, leg pain, myalgias or shortness of breath. Current antihyperlipidemic treatment includes statins. The current treatment provides moderate improvement of lipids. There are no compliance problems.    Coronary Artery Disease  Presents for follow-up visit. Pertinent negatives include no chest pain, chest pressure, chest tightness, leg swelling, palpitations or shortness of breath. Risk factors include hyperlipidemia. The symptoms have been stable. Compliance with diet is good. Compliance with exercise is good. Compliance with medications is good.        No Known Allergies    Current Outpatient Medications on File Prior to Visit   Medication Sig Dispense Refill    rosuvastatin (CRESTOR) 20 MG tablet TAKE 1 TABLET BY MOUTH DAILY 90 tablet 0    metoprolol succinate (TOPROL XL) 25 MG extended release tablet TAKE 1 TABLET BY MOUTH TWICE  DAILY 90

## 2025-05-28 DIAGNOSIS — E78.2 MIXED HYPERLIPIDEMIA: ICD-10-CM

## 2025-05-28 DIAGNOSIS — I25.10 CORONARY ARTERY DISEASE INVOLVING NATIVE CORONARY ARTERY OF NATIVE HEART WITHOUT ANGINA PECTORIS: ICD-10-CM

## 2025-05-28 LAB
CHOLESTEROL, TOTAL: 114 MG/DL
HDLC SERPL-MCNC: 32 MG/DL
LDL CHOLESTEROL: 68 MG/DL
TRIGL SERPL-MCNC: 69 MG/DL
VLDLC SERPL CALC-MCNC: 14 MG/DL

## 2025-05-30 ENCOUNTER — RESULTS FOLLOW-UP (OUTPATIENT)
Dept: PRIMARY CARE CLINIC | Age: 80
End: 2025-05-30

## 2025-05-30 NOTE — TELEPHONE ENCOUNTER
Pt called back stating that his wife got a message and he would like a phone call back his cell phone number [phone]

## 2025-05-30 NOTE — TELEPHONE ENCOUNTER
Called patient left message LDL still above 55 so recommend either increasing Crestor to 40 mg or adding Zetia 10 mg.  Call if agrees.

## 2025-06-02 DIAGNOSIS — I25.10 CORONARY ARTERY DISEASE INVOLVING NATIVE CORONARY ARTERY OF NATIVE HEART WITHOUT ANGINA PECTORIS: Primary | ICD-10-CM

## 2025-06-02 NOTE — PROGRESS NOTES
Spoke with patient we will increase his Crestor from 20 mg up to 40 mg.  This is to try to drive his LDL below 55.  Will refill medication when needs.  Recheck cholesterol and liver enzymes in 3 months.  Lab appointment only needed

## 2025-06-12 ENCOUNTER — TELEPHONE (OUTPATIENT)
Dept: PRIMARY CARE CLINIC | Age: 80
End: 2025-06-12

## 2025-06-12 RX ORDER — ROSUVASTATIN CALCIUM 40 MG/1
40 TABLET, COATED ORAL DAILY
Qty: 90 TABLET | Refills: 0 | Status: SHIPPED | OUTPATIENT
Start: 2025-06-12

## 2025-06-12 NOTE — TELEPHONE ENCOUNTER
Please refill:    Rosuvastatin   Patient states it should be changed to 40MG for refill      [facility]  Mail Order    Thank you

## 2025-06-25 ENCOUNTER — OFFICE VISIT (OUTPATIENT)
Age: 80
End: 2025-06-25
Payer: MEDICARE

## 2025-06-25 VITALS
SYSTOLIC BLOOD PRESSURE: 128 MMHG | TEMPERATURE: 97.8 F | DIASTOLIC BLOOD PRESSURE: 82 MMHG | RESPIRATION RATE: 17 BRPM | WEIGHT: 198.8 LBS | HEART RATE: 71 BPM | OXYGEN SATURATION: 98 % | BODY MASS INDEX: 27.73 KG/M2

## 2025-06-25 DIAGNOSIS — H92.01 RIGHT EAR PAIN: ICD-10-CM

## 2025-06-25 DIAGNOSIS — H60.501 ACUTE OTITIS EXTERNA OF RIGHT EAR, UNSPECIFIED TYPE: Primary | ICD-10-CM

## 2025-06-25 PROCEDURE — G8427 DOCREV CUR MEDS BY ELIG CLIN: HCPCS

## 2025-06-25 PROCEDURE — G8417 CALC BMI ABV UP PARAM F/U: HCPCS

## 2025-06-25 PROCEDURE — 1159F MED LIST DOCD IN RCRD: CPT

## 2025-06-25 PROCEDURE — 4130F TOPICAL PREP RX AOE: CPT

## 2025-06-25 PROCEDURE — 1160F RVW MEDS BY RX/DR IN RCRD: CPT

## 2025-06-25 PROCEDURE — 1036F TOBACCO NON-USER: CPT

## 2025-06-25 PROCEDURE — 99213 OFFICE O/P EST LOW 20 MIN: CPT

## 2025-06-25 PROCEDURE — 1123F ACP DISCUSS/DSCN MKR DOCD: CPT

## 2025-06-25 RX ORDER — NEOMYCIN SULFATE, POLYMYXIN B SULFATE AND HYDROCORTISONE 3.5; 10000; 1 MG/ML; [IU]/ML; MG/ML
3 SOLUTION AURICULAR (OTIC) 3 TIMES DAILY
Qty: 1 EACH | Refills: 0 | Status: SHIPPED | OUTPATIENT
Start: 2025-06-25 | End: 2025-07-05

## 2025-06-25 NOTE — PROGRESS NOTES
Chief Complaint       Ear Pain (Right ear swollen, painful /Started x2 weeks ago )      History of Present Illness   Source of history provided by:  patient.      Renzo Blas is a 79 y.o. old male presenting to the walk in clinic for evaluation of right ear pain and pressure x 2 weeks. Denies associated nasal congestion, rhinorrhea, and sore throat. Denies any discharge from the ear canal. Has tried taking Tylenol OTC without relief. Denies any fever, chills, CP, SOB, abdominal pain, neck stiffness, rash, or lethargy. Patient states ear is painful to touch and looks swollen on the outside.     ROS    Unless otherwise stated in this report or unable to obtain because of the patient's clinical or mental status as evidenced by the medical record, this patients's positive and negative responses for Review of Systems, constitutional, psych, eyes, ENT, cardiovascular, respiratory, gastrointestinal, neurological, genitourinary, musculoskeletal, integument systems and systems related to the presenting problem are either stated in the preceding or were not pertinent or were negative for the symptoms and/or complaints related to the medical problem.    Physical Exam         VS:  /82   Pulse 71   Temp 97.8 °F (36.6 °C) (Temporal)   Resp 17   Wt 90.2 kg (198 lb 12.8 oz)   SpO2 98%   BMI 27.73 kg/m²    Oxygen Saturation Interpretation: Normal.    Physical Exam  Vitals reviewed.   Constitutional:       Appearance: Normal appearance. He is normal weight.   HENT:      Head: Normocephalic and atraumatic.      Right Ear: Tympanic membrane, ear canal and external ear normal. Swelling and tenderness present. No drainage. There is no impacted cerumen.      Left Ear: Tympanic membrane, ear canal and external ear normal.      Nose: Nose normal.      Mouth/Throat:      Mouth: Mucous membranes are moist.      Pharynx: Oropharynx is clear.   Eyes:      Extraocular Movements: Extraocular movements intact.

## 2025-07-01 ENCOUNTER — OFFICE VISIT (OUTPATIENT)
Dept: PRIMARY CARE CLINIC | Age: 80
End: 2025-07-01
Payer: MEDICARE

## 2025-07-01 VITALS
OXYGEN SATURATION: 71 % | HEIGHT: 71 IN | SYSTOLIC BLOOD PRESSURE: 128 MMHG | WEIGHT: 200 LBS | HEART RATE: 98 BPM | DIASTOLIC BLOOD PRESSURE: 74 MMHG | BODY MASS INDEX: 28 KG/M2 | TEMPERATURE: 97.4 F

## 2025-07-01 DIAGNOSIS — H60.501 ACUTE OTITIS EXTERNA OF RIGHT EAR, UNSPECIFIED TYPE: Primary | ICD-10-CM

## 2025-07-01 DIAGNOSIS — H61.22 IMPACTED CERUMEN OF LEFT EAR: ICD-10-CM

## 2025-07-01 PROCEDURE — G8417 CALC BMI ABV UP PARAM F/U: HCPCS | Performed by: FAMILY MEDICINE

## 2025-07-01 PROCEDURE — 4130F TOPICAL PREP RX AOE: CPT | Performed by: FAMILY MEDICINE

## 2025-07-01 PROCEDURE — G8427 DOCREV CUR MEDS BY ELIG CLIN: HCPCS | Performed by: FAMILY MEDICINE

## 2025-07-01 PROCEDURE — 99213 OFFICE O/P EST LOW 20 MIN: CPT | Performed by: FAMILY MEDICINE

## 2025-07-01 PROCEDURE — 1159F MED LIST DOCD IN RCRD: CPT | Performed by: FAMILY MEDICINE

## 2025-07-01 PROCEDURE — 1036F TOBACCO NON-USER: CPT | Performed by: FAMILY MEDICINE

## 2025-07-01 PROCEDURE — 1160F RVW MEDS BY RX/DR IN RCRD: CPT | Performed by: FAMILY MEDICINE

## 2025-07-01 PROCEDURE — 1123F ACP DISCUSS/DSCN MKR DOCD: CPT | Performed by: FAMILY MEDICINE

## 2025-07-01 NOTE — PROGRESS NOTES
Renzo Blas, a male of 80 y.o. came to the office 7/1/2025.     Patient Active Problem List   Diagnosis    Hyperlipidemia    Tinnitus    Mobitz (type) I (Wenckebach's) atrioventricular block    Coronary artery disease due to lipid rich plaque    Second degree AV block, Mobitz type II    Pacemaker    PAF (paroxysmal atrial fibrillation) (HCC)    Persistent atrial fibrillation (HCC)    BPH with obstruction/lower urinary tract symptoms          Ear Pain   There is pain in the right ear. This is a new problem. The current episode started 1 to 4 weeks ago (1). There has been no fever. Associated symptoms include hearing loss (got worse because couldn't get hearing aid in R ear.). He has tried antibiotics and ear drops for the symptoms. The treatment provided significant relief.        No Known Allergies    Current Outpatient Medications on File Prior to Visit   Medication Sig Dispense Refill    neomycin-polymyxin-hydrocortisone (CORTISPORIN) 3.5-94793-0 otic solution Place 3 drops into the right ear 3 times daily for 10 days 1 each 0    rosuvastatin (CRESTOR) 40 MG tablet Take 1 tablet by mouth daily 90 tablet 0    metoprolol succinate (TOPROL XL) 25 MG extended release tablet TAKE 1 TABLET BY MOUTH TWICE  DAILY 90 tablet 3    dofetilide (TIKOSYN) 500 MCG capsule TAKE 1 CAPSULE BY MOUTH EVERY 12 HOURS 180 capsule 1    clopidogrel (PLAVIX) 75 MG tablet TAKE 1 TABLET BY MOUTH DAILY 90 tablet 3    diphenhydrAMINE-APAP, sleep, (TYLENOL PM EXTRA STRENGTH PO) Take by mouth daily as needed      amLODIPine (NORVASC) 5 MG tablet TAKE 1 TABLET BY MOUTH IN THE  MORNING 90 tablet 3    apixaban (ELIQUIS) 5 MG TABS tablet TAKE 1 TABLET BY MOUTH TWICE  DAILY 180 tablet 3    Multiple Vitamins-Minerals (CENTRUM SILVER ADULT 50+) TABS Take 1 tablet by mouth daily      Omega-3 Fatty Acids (FISH OIL EXTRA STRENGTH PO) Take 1,000 mg by mouth daily        No current facility-administered medications on file prior to visit.       Review of

## 2025-08-05 DIAGNOSIS — I25.10 CORONARY ARTERY DISEASE INVOLVING NATIVE CORONARY ARTERY OF NATIVE HEART WITHOUT ANGINA PECTORIS: ICD-10-CM

## 2025-08-05 LAB
ALBUMIN: 3.9 G/DL (ref 3.5–5.2)
ALP BLD-CCNC: 73 U/L (ref 40–129)
ALT SERPL-CCNC: 25 U/L (ref 0–50)
AST SERPL-CCNC: 23 U/L (ref 0–50)
BILIRUB SERPL-MCNC: 0.3 MG/DL (ref 0–1.2)
BILIRUBIN DIRECT: 0.2 MG/DL (ref 0–0.2)
BILIRUBIN, INDIRECT: 0.2 MG/DL (ref 0–1)
CHOLESTEROL, TOTAL: 96 MG/DL
HDLC SERPL-MCNC: 30 MG/DL
LDL CHOLESTEROL: 48 MG/DL
TOTAL PROTEIN: 7.3 G/DL (ref 6.4–8.3)
TRIGL SERPL-MCNC: 92 MG/DL
VLDLC SERPL CALC-MCNC: 18 MG/DL

## 2025-08-08 ENCOUNTER — OFFICE VISIT (OUTPATIENT)
Dept: PRIMARY CARE CLINIC | Age: 80
End: 2025-08-08

## 2025-08-08 VITALS
BODY MASS INDEX: 27.16 KG/M2 | HEART RATE: 75 BPM | SYSTOLIC BLOOD PRESSURE: 139 MMHG | WEIGHT: 194 LBS | OXYGEN SATURATION: 98 % | DIASTOLIC BLOOD PRESSURE: 79 MMHG | TEMPERATURE: 97.3 F | HEIGHT: 71 IN

## 2025-08-08 DIAGNOSIS — E78.2 MIXED HYPERLIPIDEMIA: ICD-10-CM

## 2025-08-08 DIAGNOSIS — B34.9 VIRAL SYNDROME: Primary | ICD-10-CM

## 2025-08-08 ASSESSMENT — ENCOUNTER SYMPTOMS
ABDOMINAL PAIN: 0
COUGH: 0
SHORTNESS OF BREATH: 0
SWOLLEN GLANDS: 0

## 2025-08-11 ENCOUNTER — TELEPHONE (OUTPATIENT)
Dept: PRIMARY CARE CLINIC | Age: 80
End: 2025-08-11

## 2025-08-11 ENCOUNTER — HOSPITAL ENCOUNTER (INPATIENT)
Age: 80
LOS: 4 days | Discharge: HOME HEALTH | DRG: 872 | End: 2025-08-15
Attending: EMERGENCY MEDICINE | Admitting: INTERNAL MEDICINE
Payer: MEDICARE

## 2025-08-11 ENCOUNTER — APPOINTMENT (OUTPATIENT)
Dept: GENERAL RADIOLOGY | Age: 80
DRG: 872 | End: 2025-08-11
Payer: MEDICARE

## 2025-08-11 DIAGNOSIS — R74.01 TRANSAMINITIS: ICD-10-CM

## 2025-08-11 DIAGNOSIS — R31.9 URINARY TRACT INFECTION WITH HEMATURIA, SITE UNSPECIFIED: Primary | ICD-10-CM

## 2025-08-11 DIAGNOSIS — N39.0 URINARY TRACT INFECTION WITH HEMATURIA, SITE UNSPECIFIED: Primary | ICD-10-CM

## 2025-08-11 DIAGNOSIS — E87.20 ACIDOSIS: ICD-10-CM

## 2025-08-11 PROBLEM — R68.89 RIGORS: Status: ACTIVE | Noted: 2025-08-11

## 2025-08-11 PROBLEM — N17.9 AKI (ACUTE KIDNEY INJURY): Status: ACTIVE | Noted: 2025-08-11

## 2025-08-11 LAB
ALBUMIN SERPL-MCNC: 3.4 G/DL (ref 3.5–5.2)
ALP SERPL-CCNC: 60 U/L (ref 40–129)
ALT SERPL-CCNC: 17 U/L (ref 0–50)
ANION GAP SERPL CALCULATED.3IONS-SCNC: 16 MMOL/L (ref 7–16)
AST SERPL-CCNC: 25 U/L (ref 0–50)
BACTERIA URNS QL MICRO: ABNORMAL
BASOPHILS # BLD: 0.02 K/UL (ref 0–0.2)
BASOPHILS NFR BLD: 0 % (ref 0–2)
BILIRUB SERPL-MCNC: 0.6 MG/DL (ref 0–1.2)
BILIRUB UR QL STRIP: NEGATIVE
BUN SERPL-MCNC: 48 MG/DL (ref 8–23)
CALCIUM SERPL-MCNC: 8.6 MG/DL (ref 8.8–10.2)
CHLORIDE SERPL-SCNC: 101 MMOL/L (ref 98–107)
CLARITY UR: ABNORMAL
CO2 SERPL-SCNC: 15 MMOL/L (ref 22–29)
COLOR UR: YELLOW
CREAT SERPL-MCNC: 2.4 MG/DL (ref 0.7–1.2)
EOSINOPHIL # BLD: 0 K/UL (ref 0.05–0.5)
EOSINOPHILS RELATIVE PERCENT: 0 % (ref 0–6)
EPI CELLS #/AREA URNS HPF: ABNORMAL /HPF
ERYTHROCYTE [DISTWIDTH] IN BLOOD BY AUTOMATED COUNT: 14.9 % (ref 11.5–15)
GFR, ESTIMATED: 27 ML/MIN/1.73M2
GLUCOSE SERPL-MCNC: 150 MG/DL (ref 74–99)
GLUCOSE UR STRIP-MCNC: NEGATIVE MG/DL
HCT VFR BLD AUTO: 33 % (ref 37–54)
HGB BLD-MCNC: 10.8 G/DL (ref 12.5–16.5)
HGB UR QL STRIP.AUTO: ABNORMAL
IMM GRANULOCYTES # BLD AUTO: 0.16 K/UL (ref 0–0.58)
IMM GRANULOCYTES NFR BLD: 1 % (ref 0–5)
KETONES UR STRIP-MCNC: NEGATIVE MG/DL
LACTATE BLDV-SCNC: 1.7 MMOL/L (ref 0.5–1.9)
LEUKOCYTE ESTERASE UR QL STRIP: ABNORMAL
LYMPHOCYTES NFR BLD: 0.36 K/UL (ref 1.5–4)
LYMPHOCYTES RELATIVE PERCENT: 2 % (ref 20–42)
MCH RBC QN AUTO: 28.7 PG (ref 26–35)
MCHC RBC AUTO-ENTMCNC: 32.7 G/DL (ref 32–34.5)
MCV RBC AUTO: 87.8 FL (ref 80–99.9)
MONOCYTES NFR BLD: 1.14 K/UL (ref 0.1–0.95)
MONOCYTES NFR BLD: 7 % (ref 2–12)
NEUTROPHILS NFR BLD: 90 % (ref 43–80)
NEUTS SEG NFR BLD: 15.32 K/UL (ref 1.8–7.3)
NITRITE UR QL STRIP: NEGATIVE
PH UR STRIP: 6 [PH] (ref 5–8)
PLATELET # BLD AUTO: 225 K/UL (ref 130–450)
PMV BLD AUTO: 9.2 FL (ref 7–12)
POTASSIUM SERPL-SCNC: 4 MMOL/L (ref 3.5–5.1)
PROT SERPL-MCNC: 7 G/DL (ref 6.4–8.3)
PROT UR STRIP-MCNC: 100 MG/DL
RBC # BLD AUTO: 3.76 M/UL (ref 3.8–5.8)
RBC # BLD: ABNORMAL 10*6/UL
RBC #/AREA URNS HPF: ABNORMAL /HPF
SARS-COV-2 RDRP RESP QL NAA+PROBE: NOT DETECTED
SODIUM SERPL-SCNC: 132 MMOL/L (ref 136–145)
SP GR UR STRIP: 1.02 (ref 1–1.03)
SPECIMEN DESCRIPTION: NORMAL
TROPONIN I SERPL HS-MCNC: 34 NG/L (ref 0–22)
UROBILINOGEN UR STRIP-ACNC: 0.2 EU/DL (ref 0–1)
WBC #/AREA URNS HPF: ABNORMAL /HPF
WBC OTHER # BLD: 17 K/UL (ref 4.5–11.5)

## 2025-08-11 PROCEDURE — 96374 THER/PROPH/DIAG INJ IV PUSH: CPT

## 2025-08-11 PROCEDURE — 71045 X-RAY EXAM CHEST 1 VIEW: CPT

## 2025-08-11 PROCEDURE — 99285 EMERGENCY DEPT VISIT HI MDM: CPT

## 2025-08-11 PROCEDURE — 93005 ELECTROCARDIOGRAM TRACING: CPT | Performed by: EMERGENCY MEDICINE

## 2025-08-11 PROCEDURE — 85025 COMPLETE CBC W/AUTO DIFF WBC: CPT

## 2025-08-11 PROCEDURE — 80053 COMPREHEN METABOLIC PANEL: CPT

## 2025-08-11 PROCEDURE — 81001 URINALYSIS AUTO W/SCOPE: CPT

## 2025-08-11 PROCEDURE — 87635 SARS-COV-2 COVID-19 AMP PRB: CPT

## 2025-08-11 PROCEDURE — 99223 1ST HOSP IP/OBS HIGH 75: CPT | Performed by: INTERNAL MEDICINE

## 2025-08-11 PROCEDURE — 6360000002 HC RX W HCPCS: Performed by: EMERGENCY MEDICINE

## 2025-08-11 PROCEDURE — 84484 ASSAY OF TROPONIN QUANT: CPT

## 2025-08-11 PROCEDURE — 87077 CULTURE AEROBIC IDENTIFY: CPT

## 2025-08-11 PROCEDURE — 87040 BLOOD CULTURE FOR BACTERIA: CPT

## 2025-08-11 PROCEDURE — 2060000000 HC ICU INTERMEDIATE R&B

## 2025-08-11 PROCEDURE — 2500000003 HC RX 250 WO HCPCS: Performed by: EMERGENCY MEDICINE

## 2025-08-11 PROCEDURE — 2580000003 HC RX 258: Performed by: EMERGENCY MEDICINE

## 2025-08-11 PROCEDURE — 83605 ASSAY OF LACTIC ACID: CPT

## 2025-08-11 PROCEDURE — 87150 DNA/RNA AMPLIFIED PROBE: CPT

## 2025-08-11 RX ORDER — METOPROLOL SUCCINATE 25 MG/1
25 TABLET, EXTENDED RELEASE ORAL DAILY
Status: DISCONTINUED | OUTPATIENT
Start: 2025-08-12 | End: 2025-08-15 | Stop reason: HOSPADM

## 2025-08-11 RX ORDER — 0.9 % SODIUM CHLORIDE 0.9 %
30 INTRAVENOUS SOLUTION INTRAVENOUS ONCE
Status: COMPLETED | OUTPATIENT
Start: 2025-08-11 | End: 2025-08-11

## 2025-08-11 RX ORDER — ACETAMINOPHEN 500 MG
500 TABLET ORAL EVERY 6 HOURS SCHEDULED
Status: DISCONTINUED | OUTPATIENT
Start: 2025-08-12 | End: 2025-08-15 | Stop reason: HOSPADM

## 2025-08-11 RX ORDER — DOFETILIDE 0.5 MG/1
500 CAPSULE ORAL EVERY 12 HOURS SCHEDULED
Status: DISCONTINUED | OUTPATIENT
Start: 2025-08-11 | End: 2025-08-15 | Stop reason: HOSPADM

## 2025-08-11 RX ORDER — ACETAMINOPHEN 500 MG
500 TABLET ORAL NIGHTLY PRN
Status: DISCONTINUED | OUTPATIENT
Start: 2025-08-11 | End: 2025-08-15 | Stop reason: HOSPADM

## 2025-08-11 RX ORDER — AMLODIPINE BESYLATE 5 MG/1
5 TABLET ORAL EVERY MORNING
Status: DISCONTINUED | OUTPATIENT
Start: 2025-08-12 | End: 2025-08-15 | Stop reason: HOSPADM

## 2025-08-11 RX ORDER — ROSUVASTATIN CALCIUM 20 MG/1
40 TABLET, COATED ORAL DAILY
Status: DISCONTINUED | OUTPATIENT
Start: 2025-08-12 | End: 2025-08-15 | Stop reason: HOSPADM

## 2025-08-11 RX ORDER — CLOPIDOGREL BISULFATE 75 MG/1
75 TABLET ORAL DAILY
Status: DISCONTINUED | OUTPATIENT
Start: 2025-08-12 | End: 2025-08-15 | Stop reason: HOSPADM

## 2025-08-11 RX ORDER — 0.9 % SODIUM CHLORIDE 0.9 %
1000 INTRAVENOUS SOLUTION INTRAVENOUS ONCE
Status: COMPLETED | OUTPATIENT
Start: 2025-08-11 | End: 2025-08-12

## 2025-08-11 RX ORDER — DIPHENHYDRAMINE HCL 25 MG
25 TABLET ORAL NIGHTLY PRN
Status: DISCONTINUED | OUTPATIENT
Start: 2025-08-11 | End: 2025-08-15 | Stop reason: HOSPADM

## 2025-08-11 RX ADMIN — WATER 2000 MG: 1 INJECTION INTRAMUSCULAR; INTRAVENOUS; SUBCUTANEOUS at 20:48

## 2025-08-11 RX ADMIN — SODIUM CHLORIDE 2259 ML: 0.9 INJECTION, SOLUTION INTRAVENOUS at 17:29

## 2025-08-11 ASSESSMENT — LIFESTYLE VARIABLES
HOW MANY STANDARD DRINKS CONTAINING ALCOHOL DO YOU HAVE ON A TYPICAL DAY: PATIENT DOES NOT DRINK
HOW OFTEN DO YOU HAVE A DRINK CONTAINING ALCOHOL: NEVER

## 2025-08-12 PROBLEM — E87.20 ACIDOSIS: Status: ACTIVE | Noted: 2025-08-12

## 2025-08-12 PROBLEM — I48.91 ATRIAL FIBRILLATION (HCC): Status: ACTIVE | Noted: 2025-08-12

## 2025-08-12 LAB
ALBUMIN SERPL-MCNC: 3.1 G/DL (ref 3.5–5.2)
ALP SERPL-CCNC: 78 U/L (ref 40–129)
ALT SERPL-CCNC: 16 U/L (ref 0–50)
ANION GAP SERPL CALCULATED.3IONS-SCNC: 14 MMOL/L (ref 7–16)
AST SERPL-CCNC: 22 U/L (ref 0–50)
BASOPHILS # BLD: 0 K/UL (ref 0–0.2)
BASOPHILS NFR BLD: 0 % (ref 0–2)
BILIRUB SERPL-MCNC: 0.3 MG/DL (ref 0–1.2)
BUN SERPL-MCNC: 44 MG/DL (ref 8–23)
CALCIUM SERPL-MCNC: 8.4 MG/DL (ref 8.8–10.2)
CHLORIDE SERPL-SCNC: 105 MMOL/L (ref 98–107)
CO2 SERPL-SCNC: 16 MMOL/L (ref 22–29)
CREAT SERPL-MCNC: 2 MG/DL (ref 0.7–1.2)
CREAT UR-MCNC: 94.3 MG/DL (ref 40–278)
EKG ATRIAL RATE: 96 BPM
EKG P AXIS: 28 DEGREES
EKG P-R INTERVAL: 208 MS
EKG Q-T INTERVAL: 388 MS
EKG QRS DURATION: 140 MS
EKG QTC CALCULATION (BAZETT): 490 MS
EKG R AXIS: -76 DEGREES
EKG T AXIS: 85 DEGREES
EKG VENTRICULAR RATE: 96 BPM
EOSINOPHIL # BLD: 0 K/UL (ref 0.05–0.5)
EOSINOPHILS RELATIVE PERCENT: 0 % (ref 0–6)
ERYTHROCYTE [DISTWIDTH] IN BLOOD BY AUTOMATED COUNT: 15.2 % (ref 11.5–15)
FERRITIN SERPL-MCNC: 160 NG/ML
GFR, ESTIMATED: 33 ML/MIN/1.73M2
GLUCOSE SERPL-MCNC: 129 MG/DL (ref 74–99)
HCT VFR BLD AUTO: 28.8 % (ref 37–54)
HGB BLD-MCNC: 9.9 G/DL (ref 12.5–16.5)
IRON SATN MFR SERPL: ABNORMAL % (ref 20–55)
IRON SERPL-MCNC: <6 UG/DL (ref 61–157)
LACTATE BLDV-SCNC: 1 MMOL/L (ref 0.5–1.9)
LYMPHOCYTES NFR BLD: 0.25 K/UL (ref 1.5–4)
LYMPHOCYTES RELATIVE PERCENT: 2 % (ref 20–42)
MCH RBC QN AUTO: 29.3 PG (ref 26–35)
MCHC RBC AUTO-ENTMCNC: 34.4 G/DL (ref 32–34.5)
MCV RBC AUTO: 85.2 FL (ref 80–99.9)
MONOCYTES NFR BLD: 0.75 K/UL (ref 0.1–0.95)
MONOCYTES NFR BLD: 5 % (ref 2–12)
NEUTROPHILS NFR BLD: 93 % (ref 43–80)
NEUTS SEG NFR BLD: 13.31 K/UL (ref 1.8–7.3)
PLATELET # BLD AUTO: 179 K/UL (ref 130–450)
PMV BLD AUTO: 9.3 FL (ref 7–12)
POTASSIUM SERPL-SCNC: 4 MMOL/L (ref 3.5–5.1)
PROCALCITONIN SERPL-MCNC: 14.3 NG/ML (ref 0–0.08)
PROT SERPL-MCNC: 6.1 G/DL (ref 6.4–8.3)
RBC # BLD AUTO: 3.38 M/UL (ref 3.8–5.8)
RBC # BLD: ABNORMAL 10*6/UL
SODIUM SERPL-SCNC: 135 MMOL/L (ref 136–145)
SODIUM UR-SCNC: 54 MMOL/L
TIBC SERPL-MCNC: ABNORMAL UG/DL (ref 250–450)
TOTAL PROTEIN, URINE: 138 MG/DL (ref 0–12)
URINE TOTAL PROTEIN CREATININE RATIO: 1.46 (ref 0–0.2)
WBC OTHER # BLD: 14.3 K/UL (ref 4.5–11.5)

## 2025-08-12 PROCEDURE — 1200000000 HC SEMI PRIVATE

## 2025-08-12 PROCEDURE — 87086 URINE CULTURE/COLONY COUNT: CPT

## 2025-08-12 PROCEDURE — 83540 ASSAY OF IRON: CPT

## 2025-08-12 PROCEDURE — 84156 ASSAY OF PROTEIN URINE: CPT

## 2025-08-12 PROCEDURE — 85025 COMPLETE CBC W/AUTO DIFF WBC: CPT

## 2025-08-12 PROCEDURE — 6360000002 HC RX W HCPCS: Performed by: SPECIALIST

## 2025-08-12 PROCEDURE — 83550 IRON BINDING TEST: CPT

## 2025-08-12 PROCEDURE — 93010 ELECTROCARDIOGRAM REPORT: CPT | Performed by: INTERNAL MEDICINE

## 2025-08-12 PROCEDURE — 83605 ASSAY OF LACTIC ACID: CPT

## 2025-08-12 PROCEDURE — 2580000003 HC RX 258: Performed by: INTERNAL MEDICINE

## 2025-08-12 PROCEDURE — 82728 ASSAY OF FERRITIN: CPT

## 2025-08-12 PROCEDURE — 84300 ASSAY OF URINE SODIUM: CPT

## 2025-08-12 PROCEDURE — 84145 PROCALCITONIN (PCT): CPT

## 2025-08-12 PROCEDURE — 6370000000 HC RX 637 (ALT 250 FOR IP): Performed by: INTERNAL MEDICINE

## 2025-08-12 PROCEDURE — 82570 ASSAY OF URINE CREATININE: CPT

## 2025-08-12 PROCEDURE — 2580000003 HC RX 258: Performed by: SPECIALIST

## 2025-08-12 PROCEDURE — 80053 COMPREHEN METABOLIC PANEL: CPT

## 2025-08-12 PROCEDURE — 99232 SBSQ HOSP IP/OBS MODERATE 35: CPT | Performed by: INTERNAL MEDICINE

## 2025-08-12 RX ORDER — SODIUM CHLORIDE 9 MG/ML
INJECTION, SOLUTION INTRAVENOUS CONTINUOUS
Status: DISCONTINUED | OUTPATIENT
Start: 2025-08-12 | End: 2025-08-15 | Stop reason: HOSPADM

## 2025-08-12 RX ADMIN — APIXABAN 2.5 MG: 2.5 TABLET, FILM COATED ORAL at 01:03

## 2025-08-12 RX ADMIN — APIXABAN 2.5 MG: 2.5 TABLET, FILM COATED ORAL at 10:01

## 2025-08-12 RX ADMIN — DOFETILIDE 500 MCG: 0.5 CAPSULE ORAL at 01:46

## 2025-08-12 RX ADMIN — DOFETILIDE 500 MCG: 0.5 CAPSULE ORAL at 20:51

## 2025-08-12 RX ADMIN — ACETAMINOPHEN 500 MG: 500 TABLET ORAL at 06:18

## 2025-08-12 RX ADMIN — METOPROLOL SUCCINATE 25 MG: 25 TABLET, EXTENDED RELEASE ORAL at 10:01

## 2025-08-12 RX ADMIN — CLOPIDOGREL BISULFATE 75 MG: 75 TABLET, FILM COATED ORAL at 10:01

## 2025-08-12 RX ADMIN — ACETAMINOPHEN 500 MG: 500 TABLET ORAL at 01:02

## 2025-08-12 RX ADMIN — ACETAMINOPHEN 500 MG: 500 TABLET ORAL at 17:33

## 2025-08-12 RX ADMIN — AMLODIPINE BESYLATE 5 MG: 5 TABLET ORAL at 10:01

## 2025-08-12 RX ADMIN — ROSUVASTATIN CALCIUM 40 MG: 20 TABLET, FILM COATED ORAL at 21:01

## 2025-08-12 RX ADMIN — ACETAMINOPHEN 500 MG: 500 TABLET ORAL at 12:58

## 2025-08-12 RX ADMIN — DOFETILIDE 500 MCG: 0.5 CAPSULE ORAL at 10:01

## 2025-08-12 RX ADMIN — PIPERACILLIN AND TAZOBACTAM 3375 MG: 3; .375 INJECTION, POWDER, FOR SOLUTION INTRAVENOUS at 17:42

## 2025-08-12 RX ADMIN — APIXABAN 2.5 MG: 2.5 TABLET, FILM COATED ORAL at 20:51

## 2025-08-12 RX ADMIN — SODIUM CHLORIDE 1000 ML: 0.9 INJECTION, SOLUTION INTRAVENOUS at 01:27

## 2025-08-12 RX ADMIN — SODIUM CHLORIDE: 0.9 INJECTION, SOLUTION INTRAVENOUS at 17:39

## 2025-08-12 ASSESSMENT — LIFESTYLE VARIABLES
HOW OFTEN DO YOU HAVE A DRINK CONTAINING ALCOHOL: NEVER
HOW MANY STANDARD DRINKS CONTAINING ALCOHOL DO YOU HAVE ON A TYPICAL DAY: PATIENT DOES NOT DRINK

## 2025-08-12 ASSESSMENT — PAIN SCALES - GENERAL
PAINLEVEL_OUTOF10: 0

## 2025-08-12 ASSESSMENT — PAIN - FUNCTIONAL ASSESSMENT: PAIN_FUNCTIONAL_ASSESSMENT: 0-10

## 2025-08-13 ENCOUNTER — APPOINTMENT (OUTPATIENT)
Dept: CT IMAGING | Age: 80
DRG: 872 | End: 2025-08-13
Payer: MEDICARE

## 2025-08-13 LAB
ANION GAP SERPL CALCULATED.3IONS-SCNC: 13 MMOL/L (ref 7–16)
BASOPHILS # BLD: 0 K/UL (ref 0–0.2)
BASOPHILS # BLD: 0.01 K/UL (ref 0–0.2)
BASOPHILS # BLD: 0.01 K/UL (ref 0–0.2)
BASOPHILS NFR BLD: 0 % (ref 0–2)
BUN SERPL-MCNC: 26 MG/DL (ref 8–23)
BUN SERPL-MCNC: 26 MG/DL (ref 8–23)
BUN SERPL-MCNC: 34 MG/DL (ref 8–23)
CALCIUM SERPL-MCNC: 8.4 MG/DL (ref 8.8–10.2)
CHLORIDE SERPL-SCNC: 107 MMOL/L (ref 98–107)
CO2 SERPL-SCNC: 16 MMOL/L (ref 22–29)
CO2 SERPL-SCNC: 18 MMOL/L (ref 22–29)
CO2 SERPL-SCNC: 18 MMOL/L (ref 22–29)
CREAT SERPL-MCNC: 1.4 MG/DL (ref 0.7–1.2)
CREAT SERPL-MCNC: 1.4 MG/DL (ref 0.7–1.2)
CREAT SERPL-MCNC: 1.5 MG/DL (ref 0.7–1.2)
CRP SERPL HS-MCNC: 307 MG/L (ref 0–5)
EOSINOPHIL # BLD: 0.01 K/UL (ref 0.05–0.5)
EOSINOPHIL # BLD: 0.03 K/UL (ref 0.05–0.5)
EOSINOPHIL # BLD: 0.03 K/UL (ref 0.05–0.5)
EOSINOPHILS RELATIVE PERCENT: 0 % (ref 0–6)
ERYTHROCYTE [DISTWIDTH] IN BLOOD BY AUTOMATED COUNT: 15.4 % (ref 11.5–15)
ERYTHROCYTE [DISTWIDTH] IN BLOOD BY AUTOMATED COUNT: 15.5 % (ref 11.5–15)
ERYTHROCYTE [DISTWIDTH] IN BLOOD BY AUTOMATED COUNT: 15.5 % (ref 11.5–15)
ERYTHROCYTE [SEDIMENTATION RATE] IN BLOOD BY WESTERGREN METHOD: 100 MM/HR (ref 0–15)
GFR, ESTIMATED: 48 ML/MIN/1.73M2
GFR, ESTIMATED: 52 ML/MIN/1.73M2
GFR, ESTIMATED: 52 ML/MIN/1.73M2
GLUCOSE SERPL-MCNC: 120 MG/DL (ref 74–99)
GLUCOSE SERPL-MCNC: 129 MG/DL (ref 74–99)
GLUCOSE SERPL-MCNC: 129 MG/DL (ref 74–99)
HCT VFR BLD AUTO: 26.4 % (ref 37–54)
HCT VFR BLD AUTO: 26.4 % (ref 37–54)
HCT VFR BLD AUTO: 29.4 % (ref 37–54)
HGB BLD-MCNC: 8.8 G/DL (ref 12.5–16.5)
HGB BLD-MCNC: 8.8 G/DL (ref 12.5–16.5)
HGB BLD-MCNC: 9.4 G/DL (ref 12.5–16.5)
IMM GRANULOCYTES # BLD AUTO: 0.03 K/UL (ref 0–0.58)
IMM GRANULOCYTES # BLD AUTO: 0.03 K/UL (ref 0–0.58)
IMM GRANULOCYTES # BLD AUTO: 0.11 K/UL (ref 0–0.58)
IMM GRANULOCYTES NFR BLD: 0 % (ref 0–5)
IMM GRANULOCYTES NFR BLD: 0 % (ref 0–5)
IMM GRANULOCYTES NFR BLD: 1 % (ref 0–5)
LYMPHOCYTES NFR BLD: 0.56 K/UL (ref 1.5–4)
LYMPHOCYTES NFR BLD: 0.69 K/UL (ref 1.5–4)
LYMPHOCYTES NFR BLD: 0.69 K/UL (ref 1.5–4)
LYMPHOCYTES RELATIVE PERCENT: 5 % (ref 20–42)
LYMPHOCYTES RELATIVE PERCENT: 8 % (ref 20–42)
LYMPHOCYTES RELATIVE PERCENT: 8 % (ref 20–42)
MAGNESIUM SERPL-MCNC: 2.3 MG/DL (ref 1.6–2.4)
MCH RBC QN AUTO: 28.6 PG (ref 26–35)
MCH RBC QN AUTO: 29 PG (ref 26–35)
MCH RBC QN AUTO: 29 PG (ref 26–35)
MCHC RBC AUTO-ENTMCNC: 32 G/DL (ref 32–34.5)
MCHC RBC AUTO-ENTMCNC: 33.3 G/DL (ref 32–34.5)
MCHC RBC AUTO-ENTMCNC: 33.3 G/DL (ref 32–34.5)
MCV RBC AUTO: 87.1 FL (ref 80–99.9)
MCV RBC AUTO: 87.1 FL (ref 80–99.9)
MCV RBC AUTO: 89.4 FL (ref 80–99.9)
MICROORGANISM SPEC CULT: NO GROWTH
MONOCYTES NFR BLD: 0.86 K/UL (ref 0.1–0.95)
MONOCYTES NFR BLD: 0.86 K/UL (ref 0.1–0.95)
MONOCYTES NFR BLD: 0.94 K/UL (ref 0.1–0.95)
MONOCYTES NFR BLD: 10 % (ref 2–12)
MONOCYTES NFR BLD: 10 % (ref 2–12)
MONOCYTES NFR BLD: 9 % (ref 2–12)
NEUTROPHILS NFR BLD: 81 % (ref 43–80)
NEUTROPHILS NFR BLD: 81 % (ref 43–80)
NEUTROPHILS NFR BLD: 85 % (ref 43–80)
NEUTS SEG NFR BLD: 6.77 K/UL (ref 1.8–7.3)
NEUTS SEG NFR BLD: 6.77 K/UL (ref 1.8–7.3)
NEUTS SEG NFR BLD: 9.4 K/UL (ref 1.8–7.3)
PHOSPHATE SERPL-MCNC: 2.7 MG/DL (ref 2.5–4.5)
PLATELET # BLD AUTO: 168 K/UL (ref 130–450)
PMV BLD AUTO: 9.6 FL (ref 7–12)
PMV BLD AUTO: 9.7 FL (ref 7–12)
PMV BLD AUTO: 9.7 FL (ref 7–12)
POTASSIUM SERPL-SCNC: 3.9 MMOL/L (ref 3.5–5.1)
POTASSIUM SERPL-SCNC: 3.9 MMOL/L (ref 3.5–5.1)
POTASSIUM SERPL-SCNC: 4 MMOL/L (ref 3.5–5.1)
PSA SERPL-MCNC: 5.26 NG/ML (ref 0–4)
RBC # BLD AUTO: 3.03 M/UL (ref 3.8–5.8)
RBC # BLD AUTO: 3.03 M/UL (ref 3.8–5.8)
RBC # BLD AUTO: 3.29 M/UL (ref 3.8–5.8)
RBC # BLD: ABNORMAL 10*6/UL
SERVICE CMNT-IMP: NORMAL
SODIUM SERPL-SCNC: 137 MMOL/L (ref 136–145)
SODIUM SERPL-SCNC: 139 MMOL/L (ref 136–145)
SODIUM SERPL-SCNC: 139 MMOL/L (ref 136–145)
SPECIMEN DESCRIPTION: NORMAL
WBC OTHER # BLD: 11 K/UL (ref 4.5–11.5)
WBC OTHER # BLD: 8.4 K/UL (ref 4.5–11.5)
WBC OTHER # BLD: 8.4 K/UL (ref 4.5–11.5)

## 2025-08-13 PROCEDURE — 84100 ASSAY OF PHOSPHORUS: CPT

## 2025-08-13 PROCEDURE — 85652 RBC SED RATE AUTOMATED: CPT

## 2025-08-13 PROCEDURE — 74176 CT ABD & PELVIS W/O CONTRAST: CPT

## 2025-08-13 PROCEDURE — G0103 PSA SCREENING: HCPCS

## 2025-08-13 PROCEDURE — 6360000002 HC RX W HCPCS

## 2025-08-13 PROCEDURE — 51798 US URINE CAPACITY MEASURE: CPT

## 2025-08-13 PROCEDURE — 83735 ASSAY OF MAGNESIUM: CPT

## 2025-08-13 PROCEDURE — 80048 BASIC METABOLIC PNL TOTAL CA: CPT

## 2025-08-13 PROCEDURE — 6370000000 HC RX 637 (ALT 250 FOR IP): Performed by: INTERNAL MEDICINE

## 2025-08-13 PROCEDURE — 86140 C-REACTIVE PROTEIN: CPT

## 2025-08-13 PROCEDURE — 2500000003 HC RX 250 WO HCPCS

## 2025-08-13 PROCEDURE — 6360000002 HC RX W HCPCS: Performed by: SPECIALIST

## 2025-08-13 PROCEDURE — 99232 SBSQ HOSP IP/OBS MODERATE 35: CPT | Performed by: INTERNAL MEDICINE

## 2025-08-13 PROCEDURE — 85025 COMPLETE CBC W/AUTO DIFF WBC: CPT

## 2025-08-13 PROCEDURE — 2580000003 HC RX 258: Performed by: SPECIALIST

## 2025-08-13 PROCEDURE — 2580000003 HC RX 258: Performed by: INTERNAL MEDICINE

## 2025-08-13 PROCEDURE — 1200000000 HC SEMI PRIVATE

## 2025-08-13 RX ADMIN — PIPERACILLIN AND TAZOBACTAM 3375 MG: 3; .375 INJECTION, POWDER, FOR SOLUTION INTRAVENOUS at 04:45

## 2025-08-13 RX ADMIN — AMLODIPINE BESYLATE 5 MG: 5 TABLET ORAL at 10:53

## 2025-08-13 RX ADMIN — ACETAMINOPHEN 500 MG: 500 TABLET ORAL at 10:54

## 2025-08-13 RX ADMIN — ACETAMINOPHEN 500 MG: 500 TABLET ORAL at 18:47

## 2025-08-13 RX ADMIN — ROSUVASTATIN CALCIUM 40 MG: 20 TABLET, FILM COATED ORAL at 20:44

## 2025-08-13 RX ADMIN — SODIUM CHLORIDE: 0.9 INJECTION, SOLUTION INTRAVENOUS at 14:58

## 2025-08-13 RX ADMIN — METOPROLOL SUCCINATE 25 MG: 25 TABLET, EXTENDED RELEASE ORAL at 10:54

## 2025-08-13 RX ADMIN — DOFETILIDE 500 MCG: 0.5 CAPSULE ORAL at 11:04

## 2025-08-13 RX ADMIN — APIXABAN 2.5 MG: 2.5 TABLET, FILM COATED ORAL at 20:44

## 2025-08-13 RX ADMIN — ACETAMINOPHEN 500 MG: 500 TABLET ORAL at 05:51

## 2025-08-13 RX ADMIN — CLOPIDOGREL BISULFATE 75 MG: 75 TABLET, FILM COATED ORAL at 10:53

## 2025-08-13 RX ADMIN — ACETAMINOPHEN 500 MG: 500 TABLET ORAL at 00:09

## 2025-08-13 RX ADMIN — DOFETILIDE 500 MCG: 0.5 CAPSULE ORAL at 20:44

## 2025-08-13 RX ADMIN — APIXABAN 2.5 MG: 2.5 TABLET, FILM COATED ORAL at 10:53

## 2025-08-13 RX ADMIN — SODIUM CHLORIDE 1000 MG: 9 INJECTION INTRAMUSCULAR; INTRAVENOUS; SUBCUTANEOUS at 14:49

## 2025-08-13 ASSESSMENT — PAIN SCALES - GENERAL: PAINLEVEL_OUTOF10: 0

## 2025-08-14 PROBLEM — A41.9 SEPSIS (HCC): Status: ACTIVE | Noted: 2025-08-14

## 2025-08-14 PROBLEM — A41.9 SEPSIS (HCC): Status: RESOLVED | Noted: 2025-08-14 | Resolved: 2025-08-14

## 2025-08-14 LAB
ACB COMPLEX DNA BLD POS QL NAA+NON-PROBE: NOT DETECTED
ANION GAP SERPL CALCULATED.3IONS-SCNC: 13 MMOL/L (ref 7–16)
B FRAGILIS DNA BLD POS QL NAA+NON-PROBE: NOT DETECTED
BASOPHILS # BLD: 0.01 K/UL (ref 0–0.2)
BASOPHILS NFR BLD: 0 % (ref 0–2)
BIOFIRE TEST COMMENT: ABNORMAL
BLACTX-M ISLT/SPM QL: NOT DETECTED
BLAIMP ISLT/SPM QL: NOT DETECTED
BLAKPC ISLT/SPM QL: NOT DETECTED
BLAOXA-48-LIKE ISLT/SPM QL: NOT DETECTED
BLAVIM ISLT/SPM QL: NOT DETECTED
BUN SERPL-MCNC: 22 MG/DL (ref 8–23)
C ALBICANS DNA BLD POS QL NAA+NON-PROBE: NOT DETECTED
C AURIS DNA BLD POS QL NAA+NON-PROBE: NOT DETECTED
C GATTII+NEOFOR DNA BLD POS QL NAA+N-PRB: NOT DETECTED
C GLABRATA DNA BLD POS QL NAA+NON-PROBE: NOT DETECTED
C KRUSEI DNA BLD POS QL NAA+NON-PROBE: NOT DETECTED
C PARAP DNA BLD POS QL NAA+NON-PROBE: NOT DETECTED
C TROPICLS DNA BLD POS QL NAA+NON-PROBE: NOT DETECTED
CALCIUM SERPL-MCNC: 8.8 MG/DL (ref 8.8–10.2)
CHLORIDE SERPL-SCNC: 107 MMOL/L (ref 98–107)
CO2 SERPL-SCNC: 20 MMOL/L (ref 22–29)
COLISTIN RES MCR-1 ISLT/SPM QL: NOT DETECTED
CREAT SERPL-MCNC: 1.3 MG/DL (ref 0.7–1.2)
E CLOAC COMP DNA BLD POS NAA+NON-PROBE: DETECTED
E COLI DNA BLD POS QL NAA+NON-PROBE: NOT DETECTED
E FAECALIS DNA BLD POS QL NAA+NON-PROBE: NOT DETECTED
E FAECIUM DNA BLD POS QL NAA+NON-PROBE: NOT DETECTED
ENTEROBACTERALES DNA BLD POS NAA+N-PRB: DETECTED
EOSINOPHIL # BLD: 0.08 K/UL (ref 0.05–0.5)
EOSINOPHILS RELATIVE PERCENT: 1 % (ref 0–6)
ERYTHROCYTE [DISTWIDTH] IN BLOOD BY AUTOMATED COUNT: 15.6 % (ref 11.5–15)
GFR, ESTIMATED: 57 ML/MIN/1.73M2
GLUCOSE SERPL-MCNC: 128 MG/DL (ref 74–99)
GP B STREP DNA BLD POS QL NAA+NON-PROBE: NOT DETECTED
HAEM INFLU DNA BLD POS QL NAA+NON-PROBE: NOT DETECTED
HCT VFR BLD AUTO: 31.2 % (ref 37–54)
HGB BLD-MCNC: 10.5 G/DL (ref 12.5–16.5)
IMM GRANULOCYTES # BLD AUTO: 0.03 K/UL (ref 0–0.58)
IMM GRANULOCYTES NFR BLD: 0 % (ref 0–5)
K OXYTOCA DNA BLD POS QL NAA+NON-PROBE: NOT DETECTED
KLEBSIELLA SP DNA BLD POS QL NAA+NON-PRB: NOT DETECTED
KLEBSIELLA SP DNA BLD POS QL NAA+NON-PRB: NOT DETECTED
L MONOCYTOG DNA BLD POS QL NAA+NON-PROBE: NOT DETECTED
LYMPHOCYTES NFR BLD: 0.74 K/UL (ref 1.5–4)
LYMPHOCYTES RELATIVE PERCENT: 9 % (ref 20–42)
MCH RBC QN AUTO: 29.2 PG (ref 26–35)
MCHC RBC AUTO-ENTMCNC: 33.7 G/DL (ref 32–34.5)
MCV RBC AUTO: 86.9 FL (ref 80–99.9)
MICROORGANISM SPEC CULT: ABNORMAL
MICROORGANISM SPEC CULT: ABNORMAL
MICROORGANISM/AGENT SPEC: ABNORMAL
MICROORGANISM/AGENT SPEC: ABNORMAL
MONOCYTES NFR BLD: 0.62 K/UL (ref 0.1–0.95)
MONOCYTES NFR BLD: 7 % (ref 2–12)
N MEN DNA BLD POS QL NAA+NON-PROBE: NOT DETECTED
NEUTROPHILS NFR BLD: 83 % (ref 43–80)
NEUTS SEG NFR BLD: 7.14 K/UL (ref 1.8–7.3)
P AERUGINOSA DNA BLD POS NAA+NON-PROBE: NOT DETECTED
PLATELET # BLD AUTO: 182 K/UL (ref 130–450)
PMV BLD AUTO: 9.6 FL (ref 7–12)
POTASSIUM SERPL-SCNC: 4.1 MMOL/L (ref 3.5–5.1)
PROTEUS SP DNA BLD POS QL NAA+NON-PROBE: NOT DETECTED
RBC # BLD AUTO: 3.59 M/UL (ref 3.8–5.8)
RBC # BLD: ABNORMAL 10*6/UL
RESISTANT GENE NDM BY PCR: NOT DETECTED
S AUREUS DNA BLD POS QL NAA+NON-PROBE: NOT DETECTED
S AUREUS+CONS DNA BLD POS NAA+NON-PROBE: NOT DETECTED
S EPIDERMIDIS DNA BLD POS QL NAA+NON-PRB: NOT DETECTED
S LUGDUNENSIS DNA BLD POS QL NAA+NON-PRB: NOT DETECTED
S MALTOPHILIA DNA BLD POS QL NAA+NON-PRB: NOT DETECTED
S MARCESCENS DNA BLD POS NAA+NON-PROBE: NOT DETECTED
S PNEUM DNA BLD POS QL NAA+NON-PROBE: NOT DETECTED
S PYO DNA BLD POS QL NAA+NON-PROBE: NOT DETECTED
SALMONELLA DNA BLD POS QL NAA+NON-PROBE: NOT DETECTED
SERVICE CMNT-IMP: ABNORMAL
SERVICE CMNT-IMP: ABNORMAL
SODIUM SERPL-SCNC: 139 MMOL/L (ref 136–145)
SPECIMEN DESCRIPTION: ABNORMAL
SPECIMEN DESCRIPTION: ABNORMAL
STREPTOCOCCUS DNA BLD POS NAA+NON-PROBE: NOT DETECTED
WBC OTHER # BLD: 8.6 K/UL (ref 4.5–11.5)

## 2025-08-14 PROCEDURE — 85025 COMPLETE CBC W/AUTO DIFF WBC: CPT

## 2025-08-14 PROCEDURE — 97165 OT EVAL LOW COMPLEX 30 MIN: CPT

## 2025-08-14 PROCEDURE — 97535 SELF CARE MNGMENT TRAINING: CPT

## 2025-08-14 PROCEDURE — 1200000000 HC SEMI PRIVATE

## 2025-08-14 PROCEDURE — 2580000003 HC RX 258: Performed by: INTERNAL MEDICINE

## 2025-08-14 PROCEDURE — 99232 SBSQ HOSP IP/OBS MODERATE 35: CPT | Performed by: STUDENT IN AN ORGANIZED HEALTH CARE EDUCATION/TRAINING PROGRAM

## 2025-08-14 PROCEDURE — 6370000000 HC RX 637 (ALT 250 FOR IP): Performed by: INTERNAL MEDICINE

## 2025-08-14 PROCEDURE — 6360000002 HC RX W HCPCS

## 2025-08-14 PROCEDURE — 2500000003 HC RX 250 WO HCPCS

## 2025-08-14 PROCEDURE — 80048 BASIC METABOLIC PNL TOTAL CA: CPT

## 2025-08-14 RX ORDER — SODIUM CHLORIDE 9 MG/ML
INJECTION, SOLUTION INTRAVENOUS PRN
Status: DISCONTINUED | OUTPATIENT
Start: 2025-08-14 | End: 2025-08-15 | Stop reason: HOSPADM

## 2025-08-14 RX ORDER — LIDOCAINE HYDROCHLORIDE 10 MG/ML
50 INJECTION, SOLUTION EPIDURAL; INFILTRATION; INTRACAUDAL; PERINEURAL ONCE
Status: COMPLETED | OUTPATIENT
Start: 2025-08-14 | End: 2025-08-15

## 2025-08-14 RX ORDER — SODIUM CHLORIDE 0.9 % (FLUSH) 0.9 %
5-40 SYRINGE (ML) INJECTION PRN
Status: DISCONTINUED | OUTPATIENT
Start: 2025-08-14 | End: 2025-08-15 | Stop reason: HOSPADM

## 2025-08-14 RX ORDER — SODIUM CHLORIDE 0.9 % (FLUSH) 0.9 %
5-40 SYRINGE (ML) INJECTION EVERY 12 HOURS SCHEDULED
Status: DISCONTINUED | OUTPATIENT
Start: 2025-08-14 | End: 2025-08-15 | Stop reason: HOSPADM

## 2025-08-14 RX ADMIN — ACETAMINOPHEN 500 MG: 500 TABLET ORAL at 13:01

## 2025-08-14 RX ADMIN — CLOPIDOGREL BISULFATE 75 MG: 75 TABLET, FILM COATED ORAL at 08:11

## 2025-08-14 RX ADMIN — METOPROLOL SUCCINATE 25 MG: 25 TABLET, EXTENDED RELEASE ORAL at 08:12

## 2025-08-14 RX ADMIN — ACETAMINOPHEN 500 MG: 500 TABLET ORAL at 05:07

## 2025-08-14 RX ADMIN — SODIUM CHLORIDE: 0.9 INJECTION, SOLUTION INTRAVENOUS at 05:07

## 2025-08-14 RX ADMIN — ACETAMINOPHEN 500 MG: 500 TABLET ORAL at 19:58

## 2025-08-14 RX ADMIN — DOFETILIDE 500 MCG: 0.5 CAPSULE ORAL at 23:21

## 2025-08-14 RX ADMIN — ROSUVASTATIN CALCIUM 40 MG: 20 TABLET, FILM COATED ORAL at 23:21

## 2025-08-14 RX ADMIN — APIXABAN 2.5 MG: 2.5 TABLET, FILM COATED ORAL at 23:22

## 2025-08-14 RX ADMIN — AMLODIPINE BESYLATE 5 MG: 5 TABLET ORAL at 08:11

## 2025-08-14 RX ADMIN — ACETAMINOPHEN 500 MG: 500 TABLET ORAL at 00:15

## 2025-08-14 RX ADMIN — SODIUM CHLORIDE 1000 MG: 9 INJECTION INTRAMUSCULAR; INTRAVENOUS; SUBCUTANEOUS at 16:21

## 2025-08-14 RX ADMIN — APIXABAN 2.5 MG: 2.5 TABLET, FILM COATED ORAL at 08:11

## 2025-08-14 RX ADMIN — DOFETILIDE 500 MCG: 0.5 CAPSULE ORAL at 08:12

## 2025-08-14 ASSESSMENT — PAIN SCALES - GENERAL
PAINLEVEL_OUTOF10: 0
PAINLEVEL_OUTOF10: 0

## 2025-08-15 ENCOUNTER — TELEPHONE (OUTPATIENT)
Dept: PRIMARY CARE CLINIC | Age: 80
End: 2025-08-15

## 2025-08-15 ENCOUNTER — APPOINTMENT (OUTPATIENT)
Dept: GENERAL RADIOLOGY | Age: 80
DRG: 872 | End: 2025-08-15
Payer: MEDICARE

## 2025-08-15 VITALS
BODY MASS INDEX: 28 KG/M2 | WEIGHT: 195.55 LBS | HEART RATE: 76 BPM | HEIGHT: 70 IN | TEMPERATURE: 98.1 F | RESPIRATION RATE: 18 BRPM | SYSTOLIC BLOOD PRESSURE: 140 MMHG | OXYGEN SATURATION: 96 % | DIASTOLIC BLOOD PRESSURE: 68 MMHG

## 2025-08-15 DIAGNOSIS — I48.0 PAF (PAROXYSMAL ATRIAL FIBRILLATION) (HCC): ICD-10-CM

## 2025-08-15 PROBLEM — R74.01 TRANSAMINITIS: Status: ACTIVE | Noted: 2025-08-15

## 2025-08-15 LAB
ALBUMIN SERPL-MCNC: 2.9 G/DL (ref 3.5–5.2)
ALP SERPL-CCNC: 224 U/L (ref 40–129)
ALT SERPL-CCNC: 228 U/L (ref 0–50)
ANION GAP SERPL CALCULATED.3IONS-SCNC: 12 MMOL/L (ref 7–16)
AST SERPL-CCNC: 274 U/L (ref 0–50)
BASOPHILS # BLD: 0 K/UL (ref 0–0.2)
BASOPHILS NFR BLD: 0 % (ref 0–2)
BILIRUB SERPL-MCNC: 0.4 MG/DL (ref 0–1.2)
BUN SERPL-MCNC: 17 MG/DL (ref 8–23)
CALCIUM SERPL-MCNC: 7.8 MG/DL (ref 8.8–10.2)
CHLORIDE SERPL-SCNC: 107 MMOL/L (ref 98–107)
CO2 SERPL-SCNC: 18 MMOL/L (ref 22–29)
CREAT SERPL-MCNC: 1.1 MG/DL (ref 0.7–1.2)
EOSINOPHIL # BLD: 0 K/UL (ref 0.05–0.5)
EOSINOPHILS RELATIVE PERCENT: 0 % (ref 0–6)
ERYTHROCYTE [DISTWIDTH] IN BLOOD BY AUTOMATED COUNT: 15.4 % (ref 11.5–15)
GFR, ESTIMATED: 66 ML/MIN/1.73M2
GLUCOSE SERPL-MCNC: 85 MG/DL (ref 74–99)
HCT VFR BLD AUTO: 27.9 % (ref 37–54)
HGB BLD-MCNC: 9.2 G/DL (ref 12.5–16.5)
LYMPHOCYTES NFR BLD: 0.36 K/UL (ref 1.5–4)
LYMPHOCYTES RELATIVE PERCENT: 4 % (ref 20–42)
MAGNESIUM SERPL-MCNC: 1.8 MG/DL (ref 1.6–2.4)
MCH RBC QN AUTO: 29 PG (ref 26–35)
MCHC RBC AUTO-ENTMCNC: 33 G/DL (ref 32–34.5)
MCV RBC AUTO: 88 FL (ref 80–99.9)
MONOCYTES NFR BLD: 0.43 K/UL (ref 0.1–0.95)
MONOCYTES NFR BLD: 5 % (ref 2–12)
NEUTROPHILS NFR BLD: 90 % (ref 43–80)
NEUTS SEG NFR BLD: 7.51 K/UL (ref 1.8–7.3)
PLATELET # BLD AUTO: 165 K/UL (ref 130–450)
PMV BLD AUTO: 9.3 FL (ref 7–12)
POTASSIUM SERPL-SCNC: 3.2 MMOL/L (ref 3.5–5.1)
PROT SERPL-MCNC: 6.2 G/DL (ref 6.4–8.3)
RBC # BLD AUTO: 3.17 M/UL (ref 3.8–5.8)
RBC # BLD: ABNORMAL 10*6/UL
SODIUM SERPL-SCNC: 138 MMOL/L (ref 136–145)
WBC OTHER # BLD: 8.3 K/UL (ref 4.5–11.5)

## 2025-08-15 PROCEDURE — 6360000002 HC RX W HCPCS

## 2025-08-15 PROCEDURE — 36569 INSJ PICC 5 YR+ W/O IMAGING: CPT

## 2025-08-15 PROCEDURE — 83735 ASSAY OF MAGNESIUM: CPT

## 2025-08-15 PROCEDURE — 2500000003 HC RX 250 WO HCPCS

## 2025-08-15 PROCEDURE — 97161 PT EVAL LOW COMPLEX 20 MIN: CPT

## 2025-08-15 PROCEDURE — 71045 X-RAY EXAM CHEST 1 VIEW: CPT

## 2025-08-15 PROCEDURE — 02HV33Z INSERTION OF INFUSION DEVICE INTO SUPERIOR VENA CAVA, PERCUTANEOUS APPROACH: ICD-10-PCS | Performed by: SPECIALIST

## 2025-08-15 PROCEDURE — 99239 HOSP IP/OBS DSCHRG MGMT >30: CPT | Performed by: STUDENT IN AN ORGANIZED HEALTH CARE EDUCATION/TRAINING PROGRAM

## 2025-08-15 PROCEDURE — 2580000003 HC RX 258: Performed by: INTERNAL MEDICINE

## 2025-08-15 PROCEDURE — 76937 US GUIDE VASCULAR ACCESS: CPT

## 2025-08-15 PROCEDURE — C1751 CATH, INF, PER/CENT/MIDLINE: HCPCS

## 2025-08-15 PROCEDURE — 80053 COMPREHEN METABOLIC PANEL: CPT

## 2025-08-15 PROCEDURE — 85025 COMPLETE CBC W/AUTO DIFF WBC: CPT

## 2025-08-15 PROCEDURE — 6370000000 HC RX 637 (ALT 250 FOR IP): Performed by: INTERNAL MEDICINE

## 2025-08-15 RX ORDER — POTASSIUM CHLORIDE 1500 MG/1
60 TABLET, EXTENDED RELEASE ORAL ONCE
Status: DISCONTINUED | OUTPATIENT
Start: 2025-08-15 | End: 2025-08-15 | Stop reason: HOSPADM

## 2025-08-15 RX ADMIN — ACETAMINOPHEN 500 MG: 500 TABLET ORAL at 03:07

## 2025-08-15 RX ADMIN — SODIUM CHLORIDE 1000 MG: 9 INJECTION INTRAMUSCULAR; INTRAVENOUS; SUBCUTANEOUS at 15:20

## 2025-08-15 RX ADMIN — METOPROLOL SUCCINATE 25 MG: 25 TABLET, EXTENDED RELEASE ORAL at 09:32

## 2025-08-15 RX ADMIN — SODIUM CHLORIDE, PRESERVATIVE FREE 20 ML: 5 INJECTION INTRAVENOUS at 13:20

## 2025-08-15 RX ADMIN — SODIUM CHLORIDE, PRESERVATIVE FREE 10 ML: 5 INJECTION INTRAVENOUS at 15:20

## 2025-08-15 RX ADMIN — CLOPIDOGREL BISULFATE 75 MG: 75 TABLET, FILM COATED ORAL at 09:32

## 2025-08-15 RX ADMIN — LIDOCAINE HYDROCHLORIDE 20 MG: 10 SOLUTION INTRAVENOUS at 12:50

## 2025-08-15 RX ADMIN — DOFETILIDE 500 MCG: 0.5 CAPSULE ORAL at 09:32

## 2025-08-15 RX ADMIN — ACETAMINOPHEN 500 MG: 500 TABLET ORAL at 15:20

## 2025-08-15 RX ADMIN — ACETAMINOPHEN 500 MG: 500 TABLET ORAL at 09:32

## 2025-08-15 RX ADMIN — APIXABAN 2.5 MG: 2.5 TABLET, FILM COATED ORAL at 09:33

## 2025-08-15 RX ADMIN — AMLODIPINE BESYLATE 5 MG: 5 TABLET ORAL at 09:32

## 2025-08-15 RX ADMIN — SODIUM CHLORIDE: 0.9 INJECTION, SOLUTION INTRAVENOUS at 09:32

## 2025-08-18 ENCOUNTER — CARE COORDINATION (OUTPATIENT)
Dept: CARE COORDINATION | Age: 80
End: 2025-08-18

## 2025-08-18 DIAGNOSIS — N30.00 ACUTE CYSTITIS WITHOUT HEMATURIA: Primary | ICD-10-CM

## 2025-08-18 PROCEDURE — 1111F DSCHRG MED/CURRENT MED MERGE: CPT | Performed by: FAMILY MEDICINE

## 2025-08-19 ENCOUNTER — TELEPHONE (OUTPATIENT)
Dept: PRIMARY CARE CLINIC | Age: 80
End: 2025-08-19

## 2025-08-19 DIAGNOSIS — I48.0 PAF (PAROXYSMAL ATRIAL FIBRILLATION) (HCC): ICD-10-CM

## 2025-08-25 ENCOUNTER — OFFICE VISIT (OUTPATIENT)
Dept: PRIMARY CARE CLINIC | Age: 80
End: 2025-08-25

## 2025-08-25 VITALS
SYSTOLIC BLOOD PRESSURE: 116 MMHG | WEIGHT: 191 LBS | BODY MASS INDEX: 26.74 KG/M2 | OXYGEN SATURATION: 99 % | HEIGHT: 71 IN | TEMPERATURE: 97.6 F | DIASTOLIC BLOOD PRESSURE: 68 MMHG | HEART RATE: 69 BPM

## 2025-08-25 DIAGNOSIS — N17.9 AKI (ACUTE KIDNEY INJURY): ICD-10-CM

## 2025-08-25 DIAGNOSIS — N12 PYELONEPHRITIS: Primary | ICD-10-CM

## 2025-08-25 DIAGNOSIS — R74.01 TRANSAMINITIS: ICD-10-CM

## 2025-08-25 DIAGNOSIS — D63.8 ANEMIA OF CHRONIC DISEASE: ICD-10-CM

## 2025-08-25 DIAGNOSIS — Z09 HOSPITAL DISCHARGE FOLLOW-UP: ICD-10-CM

## 2025-08-25 RX ORDER — ROSUVASTATIN CALCIUM 40 MG/1
40 TABLET, COATED ORAL DAILY
Qty: 90 TABLET | Refills: 1 | Status: SHIPPED | OUTPATIENT
Start: 2025-08-25

## 2025-08-26 ENCOUNTER — CARE COORDINATION (OUTPATIENT)
Dept: CARE COORDINATION | Age: 80
End: 2025-08-26

## 2025-09-02 LAB
A/G RATIO: 1.4 RATIO (ref 1.1–2.2)
ALBUMIN: 3.8 G/DL (ref 3.5–5)
ALP BLD-CCNC: 99 U/L (ref 42–121)
ALT SERPL-CCNC: 19 U/L (ref 7–52)
ANION GAP SERPL CALCULATED.3IONS-SCNC: 6 MEQ/L (ref 4–14)
AST SERPL-CCNC: 22 U/L (ref 10–41)
BASOPHILS ABSOLUTE: 0 K/UL (ref 0–0.2)
BASOPHILS RELATIVE PERCENT: 0.6 % (ref 0–1.5)
BILIRUB SERPL-MCNC: 0.4 MG/DL (ref 0.3–1.5)
BUN BLDV-MCNC: 23 MG/DL (ref 7–25)
C-REACTIVE PROTEIN: < 5 MG/L (ref 0–9.9)
CALCIUM SERPL-MCNC: 8.5 MG/DL (ref 8.5–10.5)
CHLORIDE BLD-SCNC: 103 MEQ/L (ref 98–107)
CO2: 27 MEQ/L (ref 21–31)
CREAT SERPL-MCNC: 1.06 MG/DL (ref 0.7–1.3)
CREATININE + EGFR PANEL: 81 ML/MIN
EOSINOPHILS ABSOLUTE: 0.1 K/UL (ref 0–0.33)
EOSINOPHILS RELATIVE PERCENT: 1.9 % (ref 0–3)
GFR NON-AFRICAN AMERICAN: 67 ML/MIN
GLOBULIN: 2.8 G/DL (ref 1.9–3.9)
GLUCOSE BLD-MCNC: 77 MG/DL (ref 70–99)
HCT VFR BLD CALC: 30.9 % (ref 41–50)
HEMOGLOBIN: 10.5 G/DL (ref 13.5–16.5)
LYMPHOCYTES ABSOLUTE: 1 K/UL (ref 1.1–4.8)
LYMPHOCYTES RELATIVE PERCENT: 22 % (ref 24–44)
MCH RBC QN AUTO: 28.8 PG (ref 28–34)
MCHC RBC AUTO-ENTMCNC: 34.1 G/DL (ref 33–37)
MCV RBC AUTO: 84.3 FL (ref 80–100)
MONOCYTES ABSOLUTE: 0.5 K/UL (ref 0.2–0.7)
MONOCYTES RELATIVE PERCENT: 11.5 % (ref 3.4–9)
NEUTROPHILS ABSOLUTE: 2.9 K/UL (ref 1.83–8.7)
NEUTROPHILS RELATIVE PERCENT: 64 % (ref 40–74)
PDW BLD-RTO: 15.9 % (ref 10.9–14.3)
PLATELET # BLD: 323 K/UL (ref 150–450)
PMV BLD AUTO: 6.5 FL (ref 7.4–10.4)
POTASSIUM SERPL-SCNC: 5 MEQ/L (ref 3.6–5)
RBC # BLD: 3.66 M/UL (ref 4.5–5.5)
SED RATE, AUTOMATED: 78 MM/HR (ref 0–20)
SODIUM BLD-SCNC: 136 MEQ/L (ref 135–145)
TOTAL PROTEIN: 6.6 G/DL (ref 6.2–8)
WBC # BLD: 4.5 K/UL (ref 4.5–11)

## 2025-09-03 ENCOUNTER — CARE COORDINATION (OUTPATIENT)
Dept: CARE COORDINATION | Age: 80
End: 2025-09-03

## (undated) DEVICE — GLOVE SURG 7.5 LTX TRIFLEX WIDE FINGER PWDR

## (undated) DEVICE — CATHETER URETH 24FR BLLN 30CC SIL ALLY W/ SIL HYDRGEL 3 W F

## (undated) DEVICE — GUIDEWIRE UROLOGICAL STR STD 0.035 IN X150 CM (QTY = BOX OF 5)

## (undated) DEVICE — SOLUTION IRRIG 3000ML STRL H2O USP UROMATIC PLAS CONT

## (undated) DEVICE — ELECTRODE ES VPR FOR USA ELITE SYS

## (undated) DEVICE — CYSTO PACK: Brand: MEDLINE INDUSTRIES, INC.

## (undated) DEVICE — GOWN,SIRUS,FABRNF,XL,20/CS: Brand: MEDLINE

## (undated) DEVICE — SYRINGE,TOOMEY,IRRIGATION,70CC,STERILE: Brand: MEDLINE

## (undated) DEVICE — TUR/ENDOSCOPIC CABLE, 10' (3.05 M): Brand: CONMED

## (undated) DEVICE — CONE TIP URETERAL CATHETER: Brand: URETERAL CATHETER

## (undated) DEVICE — MEDICINE CUP, GRADUATED, STER: Brand: MEDLINE

## (undated) DEVICE — 4-PORT MANIFOLD: Brand: NEPTUNE 2

## (undated) DEVICE — GAUZE,SPONGE,4"X4",8PLY,STRL,LF,10/TRAY: Brand: MEDLINE

## (undated) DEVICE — SOLUTION IRRIG 1000ML STRL H2O USP PLAS POUR BTL

## (undated) DEVICE — DRAINBAG,ANTI-REFLUX TOWER,L/F,2000ML,LL: Brand: MEDLINE

## (undated) DEVICE — BAG DRNGE COMB PK

## (undated) DEVICE — GLOVE ORANGE PI 7 1/2   MSG9075

## (undated) DEVICE — SOLUTION SCRB 4OZ 4% CHG H2O AIDED FOR PREOPERATIVE SKIN

## (undated) DEVICE — GLOVE ORANGE PI 7   MSG9070

## (undated) DEVICE — WHISTLE TIP URETERAL CATHETER (RIGHT): Brand: COOK

## (undated) DEVICE — ELECTRODE ES 28FR WIRE 0.012IN BLU R ANG CUT LOOP STBL FOR